# Patient Record
Sex: FEMALE | Race: WHITE | NOT HISPANIC OR LATINO | Employment: FULL TIME | URBAN - METROPOLITAN AREA
[De-identification: names, ages, dates, MRNs, and addresses within clinical notes are randomized per-mention and may not be internally consistent; named-entity substitution may affect disease eponyms.]

---

## 2017-01-16 ENCOUNTER — TELEPHONE (OUTPATIENT)
Dept: SURGERY | Facility: CLINIC | Age: 52
End: 2017-01-16

## 2017-01-16 NOTE — TELEPHONE ENCOUNTER
Patient is 7 years PO gastric bypass.  She is having a colonoscopy this Friday and has questions re: prep.  She requested a call back.    Sayda Mejia MS, RD, RN    Returned patient's call.  She is wondering if she can start the prep earlier than early evening. She is concerned because she doesn't think she will be able to get all the prep in if she starts early evening.  Yes it is okay to start prep in the afternoon.  The most important thing to do is to stay well hydrated and get foods in with electroytes.  Patient verbalized understanding and is agreeable to plan.  Sayda Mejia MS, RD, RN

## 2017-01-30 ENCOUNTER — TELEPHONE (OUTPATIENT)
Dept: INTERNAL MEDICINE | Facility: CLINIC | Age: 52
End: 2017-01-30

## 2017-01-30 DIAGNOSIS — M25.562 LEFT KNEE PAIN, UNSPECIFIED CHRONICITY: Primary | ICD-10-CM

## 2017-01-30 NOTE — TELEPHONE ENCOUNTER
Patient calling requesting referral for knee replacement. Pt has been seen at [facility] and was not happy with their care. Pt states PCP had discussed referral to another  Surgeon   from [facility] 6/7/16. She prefers to be scheduled with a Ortho surgeon at [facility] d/t adverse effects from anesthesia. Requesting call back once completed.  Please advise, thanks.

## 2017-01-30 NOTE — TELEPHONE ENCOUNTER
I recommend Gino Alex of [facility]. He will do surgery at [facility], she will just need to tell him that is her preference. Referral placed

## 2017-02-08 ENCOUNTER — TRANSFERRED RECORDS (OUTPATIENT)
Dept: HEALTH INFORMATION MANAGEMENT | Facility: CLINIC | Age: 52
End: 2017-02-08

## 2017-05-15 DIAGNOSIS — R21 RASH: ICD-10-CM

## 2017-05-15 RX ORDER — ACYCLOVIR 400 MG/1
400 TABLET ORAL 3 TIMES DAILY
Qty: 21 TABLET | Refills: 5 | Status: SHIPPED | OUTPATIENT
Start: 2017-05-15 | End: 2018-07-19

## 2017-05-15 NOTE — TELEPHONE ENCOUNTER
Reason for Call:  Medication or medication refill:    Do you use a [facility]?  Name of the pharmacy and phone number for the current request:  [address] ([place]) - [phone]    Name of the medication requested: acyclovir 400mg TID    Other request: Pt says she filled this a few years ago.    Can we leave a detailed message on this number? YES    Phone number patient can be reached at: Home number on file [phone] (home)    Best Time: any    Call taken on 5/15/2017 at 7:32 AM by Caro Griffin

## 2017-05-15 NOTE — TELEPHONE ENCOUNTER
Acyclovir 400 mg three times daily       Last Written Prescription Date:  2014  Last Office Visit with G, [facility] or [facility] prescribing provider: 6/7/16  Future Office visit:       Routing refill request to provider for review/approval because:  Medication is reported/historical or discontinued

## 2017-06-25 ENCOUNTER — OFFICE VISIT (OUTPATIENT)
Dept: URGENT CARE | Facility: URGENT CARE | Age: 52
End: 2017-06-25
Payer: COMMERCIAL

## 2017-06-25 VITALS
HEART RATE: 63 BPM | RESPIRATION RATE: 16 BRPM | DIASTOLIC BLOOD PRESSURE: 80 MMHG | SYSTOLIC BLOOD PRESSURE: 120 MMHG | BODY MASS INDEX: 30.55 KG/M2 | OXYGEN SATURATION: 98 % | WEIGHT: 198 LBS | TEMPERATURE: 98.8 F

## 2017-06-25 DIAGNOSIS — J01.90 ACUTE SINUSITIS WITH SYMPTOMS > 10 DAYS: Primary | ICD-10-CM

## 2017-06-25 PROCEDURE — 99213 OFFICE O/P EST LOW 20 MIN: CPT | Performed by: PHYSICIAN ASSISTANT

## 2017-06-25 RX ORDER — AMOXICILLIN 875 MG
875 TABLET ORAL 2 TIMES DAILY
Qty: 28 TABLET | Refills: 0 | Status: SHIPPED | OUTPATIENT
Start: 2017-06-25 | End: 2017-07-09

## 2017-06-25 RX ORDER — FLUTICASONE PROPIONATE 50 MCG
2 SPRAY, SUSPENSION (ML) NASAL DAILY
Qty: 1 BOTTLE | Refills: 0 | Status: SHIPPED | OUTPATIENT
Start: 2017-06-25 | End: 2019-12-03

## 2017-06-25 NOTE — PROGRESS NOTES
"    SUBJECTIVE:     Shereen Oliver presents to clinic today for evaluation of nasal congestion, purulent rhinorrhea, post-nasal drainage, sinus pain/pressure, on/off bilateral ear pressure, and tooth pain x ~ 2 weeks duration. Tooth pain so bothersome she went to dentist last week but was told no tooth infections.       Patient denies any significant cough, F/C/N/V/D, rash, abdominal pain or any other acute illness sxs.     Despite above acute illness sxs, patient still alert, active and taking in PO solids and fluids.  Normal BM's and urination.         OBJECTIVE:   /80 (BP Location: Right arm, Cuff Size: Adult Large)  Pulse 63  Temp 98.8  F (37.1  C) (Oral)  Resp 16  Wt 198 lb (89.8 kg)  SpO2 98%  BMI 30.55 kg/m2       General appearance: alert and no apparent distress   Skin color is pink and without rash.   HEENT:   Conjunctiva not injected. Sclera clear.   Left TM is normal: no effusions, no erythema, and normal landmarks.   Right TM is normal: no effusions, no erythema, and normal landmarks.   Nasal mucosa is red and swollen. Maxillary sinuses are tender bilaterally.   Oropharyngeal exam is normal other than evidence of PND: no lesions, erythema, adenopathy or exudate. Neck is supple with no adenopathy   CARDIAC:NORMAL - regular rate and rhythm without murmur.   RESP: Normal - CTA without rales, rhonchi, or wheezing.   NEURO: Alert and oriented.  Normal speech and mentation.  CN II/XII grossly intact.  Gait within normal limits.      ASSESSMENT/PLAN:     (J01.90) Acute sinusitis with symptoms > 10 days  (primary encounter diagnosis)  Plan: amoxicillin (AMOXIL) 875 MG tablet, fluticasone        (FLONASE) 50 MCG/ACT spray    1. ABX as per above   2. Flonase  3.Follow-up with PCP if sxs change, worsen or fail to fully resolve with above tx.  4.  In addition to the above, sinusitis \"red flag\" signs and sxs are reviewed with pt both verbally and by way of printed educational material for home review.  Pt " verbalizes understanding of and agrees to the above plan.

## 2017-06-25 NOTE — NURSING NOTE
"Shereen Oliver is a 52 year old female.      Chief Complaint   Patient presents with     URI     pt is here for a possible sinu sinfection - she is having left sided face pain - did go to her dentist and was told her that everything is fine - has a hx of sinus infections     Urgent Care       Initial /80 (BP Location: Right arm, Cuff Size: Adult Large)  Pulse 63  Temp 98.8  F (37.1  C) (Oral)  Resp 16  Wt 198 lb (89.8 kg)  SpO2 98%  BMI 30.55 kg/m2 Estimated body mass index is 30.55 kg/(m^2) as calculated from the following:    Height as of 6/7/16: 5' 7.5\" (1.715 m).    Weight as of this encounter: 198 lb (89.8 kg).  Medication Reconciliation: complete      Questioned patient about current smoking habits.  Pt. has never smoked.      Jody Shaw CMA      "

## 2017-06-25 NOTE — PATIENT INSTRUCTIONS
Sinusitis (Antibiotic Treatment)    The sinuses are air-filled spaces within the bones of the face. They connect to the inside of the nose. Sinusitis is an inflammation of the tissue lining the sinus cavity. Sinus inflammation can occur during a cold. It can also be due to allergies to pollens and other particles in the air. Sinusitis can cause symptoms of sinus congestion and fullness. A sinus infection causes fever, headache and facial pain. There is often green or yellow drainage from the nose or into the back of the throat (post-nasal drip). You have been given antibiotics to treat this condition.  Home care:    Take the full course of antibiotics as instructed. Do not stop taking them, even if you feel better.    Drink plenty of water, hot tea, and other liquids. This may help thin mucus. It also may promote sinus drainage.    Heat may help soothe painful areas of the face. Use a towel soaked in hot water. Or,  the shower and direct the hot spray onto your face. Using a vaporizer along with a menthol rub at night may also help.     An expectorant containing guaifenesin may help thin the mucus and promote drainage from the sinuses.    Over-the-counter decongestants may be used unless a similar medicine was prescribed. Nasal sprays work the fastest. Use one that contains phenylephrine or oxymetazoline. First blow the nose gently. Then use the spray. Do not use these medicines more often than directed on the label or symptoms may get worse. You may also use tablets containing pseudoephedrine. Avoid products that combine ingredients, because side effects may be increased. Read labels. You can also ask the pharmacist for help. (NOTE: Persons with high blood pressure should not use decongestants. They can raise blood pressure.)    Over-the-counter antihistamines may help if allergies contributed to your sinusitis.      Do not use nasal rinses or irrigation during an acute sinus infection, unless told to by  your health care provider. Rinsing may spread the infection to other sinuses.    Use acetaminophen or ibuprofen to control pain, unless another pain medicine was prescribed. (If you have chronic liver or kidney disease or ever had a stomach ulcer, talk with your doctor before using these medicines. Aspirin should never be used in anyone under 18 years of age who is ill with a fever. It may cause severe liver damage.)    Don't smoke. This can worsen symptoms.  Follow-up care  Follow up with your healthcare provider or our staff if you are not improving within the next week.  When to seek medical advice  Call your healthcare provider if any of these occur:    Facial pain or headache becoming more severe    Stiff neck    Unusual drowsiness or confusion    Swelling of the forehead or eyelids    Vision problems, including blurred or double vision    Fever of 100.4 F (38 C) or higher, or as directed by your healthcare provider    Seizure    Breathing problems    Symptoms not resolving within 10 days  Date Last Reviewed: 4/13/2015 2000-2017 [employer]. [address] [place] [address], [place] [id number]. All rights reserved. This information is not intended as a substitute for professional medical care. Always follow your healthcare professional's instructions.

## 2017-06-25 NOTE — MR AVS SNAPSHOT
After Visit Summary   6/25/2017    Shereen Oliver    MRN: [id number]           Patient Information     Date Of Birth          1965        Visit Information        Provider Department      6/25/2017 10:10 AM Juan M Goode PA-C [facility]        Today's Diagnoses     Acute sinusitis with symptoms > 10 days    -  1      Care Instructions      Sinusitis (Antibiotic Treatment)    The sinuses are air-filled spaces within the bones of the face. They connect to the inside of the nose. Sinusitis is an inflammation of the tissue lining the sinus cavity. Sinus inflammation can occur during a cold. It can also be due to allergies to pollens and other particles in the air. Sinusitis can cause symptoms of sinus congestion and fullness. A sinus infection causes fever, headache and facial pain. There is often green or yellow drainage from the nose or into the back of the throat (post-nasal drip). You have been given antibiotics to treat this condition.  Home care:    Take the full course of antibiotics as instructed. Do not stop taking them, even if you feel better.    Drink plenty of water, hot tea, and other liquids. This may help thin mucus. It also may promote sinus drainage.    Heat may help soothe painful areas of the face. Use a towel soaked in hot water. Or,  the shower and direct the hot spray onto your face. Using a vaporizer along with a menthol rub at night may also help.     An expectorant containing guaifenesin may help thin the mucus and promote drainage from the sinuses.    Over-the-counter decongestants may be used unless a similar medicine was prescribed. Nasal sprays work the fastest. Use one that contains phenylephrine or oxymetazoline. First blow the nose gently. Then use the spray. Do not use these medicines more often than directed on the label or symptoms may get worse. You may also use tablets containing pseudoephedrine. Avoid products that  combine ingredients, because side effects may be increased. Read labels. You can also ask the pharmacist for help. (NOTE: Persons with high blood pressure should not use decongestants. They can raise blood pressure.)    Over-the-counter antihistamines may help if allergies contributed to your sinusitis.      Do not use nasal rinses or irrigation during an acute sinus infection, unless told to by your health care provider. Rinsing may spread the infection to other sinuses.    Use acetaminophen or ibuprofen to control pain, unless another pain medicine was prescribed. (If you have chronic liver or kidney disease or ever had a stomach ulcer, talk with your doctor before using these medicines. Aspirin should never be used in anyone under 18 years of age who is ill with a fever. It may cause severe liver damage.)    Don't smoke. This can worsen symptoms.  Follow-up care  Follow up with your healthcare provider or our staff if you are not improving within the next week.  When to seek medical advice  Call your healthcare provider if any of these occur:    Facial pain or headache becoming more severe    Stiff neck    Unusual drowsiness or confusion    Swelling of the forehead or eyelids    Vision problems, including blurred or double vision    Fever of 100.4 F (38 C) or higher, or as directed by your healthcare provider    Seizure    Breathing problems    Symptoms not resolving within 10 days  Date Last Reviewed: 4/13/2015 2000-2017 [employer]. [address] [place] [address], [place] [address]. All rights reserved. This information is not intended as a substitute for professional medical care. Always follow your healthcare professional's instructions.                Follow-ups after your visit        Who to contact     If you have questions or need follow up information about today's clinic visit or your schedule please contact [facility] directly at [phone].  Normal or non-critical lab and  imaging results will be communicated to you by [employer], letter or phone within 4 business days after the clinic has received the results. If you do not hear from us within 7 days, please contact the clinic through [email or web address] or phone. If you have a critical or abnormal lab result, we will notify you by phone as soon as possible.  Submit refill requests through [email or web address] or call your pharmacy and they will forward the refill request to us. Please allow 3 business days for your refill to be completed.          Additional Information About Your Visit        [email or web address] Information     [email or web address] gives you secure access to your electronic health record. If you see a primary care provider, you can also send messages to your care team and make appointments. If you have questions, please call your primary care clinic.  If you do not have a primary care provider, please call [phone] and they will assist you.        Care EveryWhere ID     This is your Care EveryWhere ID. This could be used by other organizations to access your [place] medical records  [id number]        Your Vitals Were     Pulse Temperature Respirations Pulse Oximetry BMI (Body Mass Index)       63 98.8  F (37.1  C) (Oral) 16 98% 30.55 kg/m2        Blood Pressure from Last 3 Encounters:   06/25/17 120/80   01/20/17 106/75   06/07/16 92/60    Weight from Last 3 Encounters:   06/25/17 198 lb (89.8 kg)   06/07/16 202 lb (91.6 kg)   11/27/15 200 lb 1.6 oz (90.8 kg)              Today, you had the following     No orders found for display         Today's Medication Changes          These changes are accurate as of: 6/25/17 10:45 AM.  If you have any questions, ask your nurse or doctor.               Start taking these medicines.        Dose/Directions    amoxicillin 875 MG tablet   Commonly known as:  AMOXIL   Used for:  Acute sinusitis with symptoms > 10 days   Started by:  Juan M Goode PA-C        Dose:  875 mg   Take 1 tablet (875 mg) by mouth  2 times daily for 14 days   Quantity:  28 tablet   Refills:  0       fluticasone 50 MCG/ACT spray   Commonly known as:  FLONASE   Used for:  Acute sinusitis with symptoms > 10 days   Started by:  Juan M Goode PA-C        Dose:  2 spray   Spray 2 sprays into both nostrils daily   Quantity:  1 Bottle   Refills:  0            Where to get your medicines      These medications were sent to [facility] [address] [address] AT [place] [address]  [address] [facility] [address], [facility] [address]     Phone:  [phone]     amoxicillin 875 MG tablet    fluticasone 50 MCG/ACT spray                Primary Care Provider Office Phone # Fax #    Jody Rbiera [phone]       [facility] [address]  [facility] [address]        Equal Access to Services     TAVIA [facility] Hospital[facility]YSTLE AH: Hadii aad ku hadasho Soomaali, waaxda luqadaha, qaybta kaalmada adeegyada, waxay idiin hayaan adeeg kharash galileon ah. So [facility] [phone].    ATENCIÓN: Si habla español, tiene a barragan disposición servicios gratuitos de asistencia lingüística. Skye al [phone].    We comply with applicable federal civil rights laws and [place] laws. We do not discriminate on the basis of race, color, national origin, age, disability sex, sexual orientation or gender identity.            Thank you!     Thank you for choosing [facility]  for your care. Our goal is always to provide you with excellent care. Hearing back from our patients is one way we can continue to improve our services. Please take a few minutes to complete the written survey that you may receive in the mail after your visit with us. Thank you!             Your Updated Medication List - Protect others around you: Learn how to safely use, store and throw away your medicines at www.disposemymeds.org.          This list is accurate as of: 6/25/17 10:45 AM.  Always use your most recent med  list.                   Brand Name Dispense Instructions for use Diagnosis    acyclovir 400 MG tablet    ZOVIRAX    21 tablet    Take 1 tablet (400 mg) by mouth 3 times daily    Rash       albuterol 108 (90 BASE) MCG/ACT Inhaler    PROAIR HFA/PROVENTIL HFA/VENTOLIN HFA    1 Inhaler    Inhale 2 puffs into the lungs every 4 hours as needed for shortness of breath / dyspnea    Intermittent asthma       amoxicillin 875 MG tablet    AMOXIL    28 tablet    Take 1 tablet (875 mg) by mouth 2 times daily for 14 days    Acute sinusitis with symptoms > 10 days       fluticasone 50 MCG/ACT spray    FLONASE    1 Bottle    Spray 2 sprays into both nostrils daily    Acute sinusitis with symptoms > 10 days       gabapentin 100 MG capsule    NEURONTIN    60 capsule    1-2 per day as needed for pain    Neuropathy (H)       ibuprofen 800 MG tablet    ADVIL/MOTRIN    90 tablet    Take 1 tablet (800 mg) by mouth every 8 hours as needed for moderate pain    S/P biopsy       levonorgestrel 20 MCG/24HR IUD    MIRENA    1 each    1 each (20 mcg) by Intrauterine route once for 1 dose    Contraception       lidocaine-prilocaine cream    EMLA    70 g    Apply topically every 6 hours as needed for moderate pain    S/P biopsy       omeprazole 40 MG capsule    priLOSEC    90 capsule    Take 1 capsule (40 mg) by mouth daily    Perforated ulcer (H)       Vitamin D 30255 UNIT Caps     12    3 times a week

## 2017-07-10 ENCOUNTER — THERAPY VISIT (OUTPATIENT)
Dept: PHYSICAL THERAPY | Facility: CLINIC | Age: 52
End: 2017-07-10
Payer: COMMERCIAL

## 2017-07-10 DIAGNOSIS — M25.511 ACUTE PAIN OF RIGHT SHOULDER: Primary | ICD-10-CM

## 2017-07-10 PROCEDURE — 97161 PT EVAL LOW COMPLEX 20 MIN: CPT | Mod: GP | Performed by: PHYSICAL THERAPIST

## 2017-07-10 PROCEDURE — 97110 THERAPEUTIC EXERCISES: CPT | Mod: GP | Performed by: PHYSICAL THERAPIST

## 2017-07-10 PROCEDURE — 97035 APP MDLTY 1+ULTRASOUND EA 15: CPT | Mod: GP | Performed by: PHYSICAL THERAPIST

## 2017-07-10 NOTE — PROGRESS NOTES
Shereen Oliver is a 52 year old female patient presenting to Physical Therapy with the following Primary Symptoms: Right sided pain along the back of the shoulder, scapula, R upper and forearm along lateral aspect, R armpit pain.  Also pain in middle finger.  Difficulty moving R arm along the front of the arm.  Shoulder feels hot.  She denies having related symptoms spreading to the left side. These pains are described as constant.   She denies having painful cough/sneeze, saddle anaesthesia, severe night pain, peripheral motor deficit, recent bowel/bladder change, recent vision change, ringing in the ears or pain with swallowing. Pain is rated 9/10 at worst. History of symptoms: These pains began suddenly 5 days ago as the result of no specific episode or injury.  Previous treatment has included nothing.   Since onset, these pains are getting worse :  Current Symptoms are worsened by: lifting the arm forward or away from sitting. Current Symptoms are relieved by ice, gabapentin, tylenol.   Recent surgical procedure: old Cx spine fusion C456d, ant approach. Performed on 15 years ago.   General health as reported by patient is:  good.  Pertinent medical history: none significant.  She denies any significant current illness or recent hospital admissions. She denies any regonal implanted devices.  Current occupational status: desk job. Previous functional status:  fully functional prior to pain onset/injury.           HPI                    Objective:    System              Cervical/Thoracic Evaluation  Cervical AROM: normal (pain free all planes head mobility)           Cervical Myotomes:        C4 (shrug):  Left: 5    Right: 5  C5 (Deltoid):  Left: 5    Right: 4  C6 (Biceps):  Left: 5    Right: 5  C7 (Triceps):  Left: 5    Right: 5  C8 (Thumb Ext): Left: 5    Right: 5  T1 (Intrinsics): Right: 5  DTR's:    C5 (Biceps):  Left:  2  Right:  2     C6 (Brachioradialis):  Left:  2  Right:  2     C7 (Triceps):  Left:  2   Right:  2    Cervical Dermatomes:  normal                            Cord Sign:  not assessed         Shoulder Evaluation:  ROM:  AROM:    Flexion:  Right:  30 deg  Extension: Right: NL  Abduction:  Right:  10 deg      External Rotation:  Right:  10                 PROM:    Flexion:  Right: 50 deg      Abduction:  Right:  55 deg      External Rotation:  Right:  15deg                      Stability Testing:        Right shoulder stability negative testing:  Apprehension; Relocation and Sulcus sign  Special Tests:      Right shoulder positive for the following special tests:Impingement (v painful Neer and HK)  Right shoulder negative for the following special tests:Rotator cuff tear  Palpation:      Right shoulder tenderness present at: Supraspinatus; Deltoid and Bicipital Groove  Mobility Tests:      Glenohumeral posterior right:  Hypomobile  Glenohumeral inferior right:  Hypomobile          Scapulohumeral rhythm right:  Hypomobile                                   General     ROS    Assessment/Plan:      Patient is a 52 year old female with right side shoulder complaints.    Patient has the following significant findings with corresponding treatment plan.                Diagnosis 1:  R shoulder ADAMS  Pain -  hot/cold therapy, US, electric stimulation, manual therapy, self management, education, directional preference exercise and home program  Decreased ROM/flexibility - manual therapy and therapeutic exercise  Decreased joint mobility - manual therapy and therapeutic exercise  Decreased strength - therapeutic exercise and therapeutic activities  Inflammation - cold therapy and self management/home program  Impaired muscle performance - neuro re-education  Decreased function - therapeutic activities    Therapy Evaluation Codes:   1) History comprised of:   Personal factors that impact the plan of care:      None.    Comorbidity factors that impact the plan of care are:      None.     Medications impacting care:  Anti-inflammatory.  2) Examination of Body Systems comprised of:   Body structures and functions that impact the plan of care:      Shoulder.   Activity limitations that impact the plan of care are:      Bathing, Cooking, Driving, Dressing, Grasping, Lifting, Working and Sleeping.  3) Clinical presentation characteristics are:   Stable/Uncomplicated.  4) Decision-Making    Low complexity using standardized patient assessment instrument and/or measureable assessment of functional outcome.  Cumulative Therapy Evaluation is: Low complexity.    Previous and current functional limitations:  (See Goal Flow Sheet for this information)    Short term and Long term goals: (See Goal Flow Sheet for this information)     Communication ability:  Patient appears to be able to clearly communicate and understand verbal and written communication and follow directions correctly.  Treatment Explanation - The following has been discussed with the patient:   RX ordered/plan of care  Anticipated outcomes  Possible risks and side effects  This patient would benefit from PT intervention to resume normal activities.   Rehab potential is excellent.    Frequency:  2 X week, once daily  Duration:  for 4 weeks  Discharge Plan:  Achieve all LTG.  Independent in home treatment program.  Reach maximal therapeutic benefit.    Please refer to the daily flowsheet for treatment today, total treatment time and time spent performing 1:1 timed codes.

## 2017-07-11 ENCOUNTER — OFFICE VISIT (OUTPATIENT)
Dept: INTERNAL MEDICINE | Facility: CLINIC | Age: 52
End: 2017-07-11
Payer: COMMERCIAL

## 2017-07-11 VITALS
BODY MASS INDEX: 30.77 KG/M2 | SYSTOLIC BLOOD PRESSURE: 130 MMHG | HEART RATE: 68 BPM | DIASTOLIC BLOOD PRESSURE: 80 MMHG | HEIGHT: 68 IN | OXYGEN SATURATION: 99 % | WEIGHT: 203 LBS | TEMPERATURE: 98.7 F

## 2017-07-11 DIAGNOSIS — M25.511 ACUTE PAIN OF RIGHT SHOULDER: Primary | ICD-10-CM

## 2017-07-11 PROCEDURE — 99213 OFFICE O/P EST LOW 20 MIN: CPT | Performed by: NURSE PRACTITIONER

## 2017-07-11 NOTE — MR AVS SNAPSHOT
After Visit Summary   7/11/2017    Shereen Oliver    MRN: [id number]           Patient Information     Date Of Birth          1965        Visit Information        Provider Department      7/11/2017 11:00 AM Asia Mayo NP [facility]        Today's Diagnoses     Acute pain of right shoulder    -  1       Follow-ups after your visit        Additional Services     ZE PT, HAND, AND CHIROPRACTIC REFERRAL       **This order will print in the [facility] Scheduling Office**    Physical Therapy, Hand Therapy and Chiropractic Care are available through:    *[facility]  *[facility]  *[facility]    Call one number to schedule at any of the above locations: [phone].    Your provider has referred you to: Physical Therapy at [facility] or [facility]    Indication/Reason for Referral: Shoulder Pain  Onset of Illness: 1 week  Therapy Orders: Evaluate and Treat  Special Programs:   Special Request:     Quoc Kumari      Additional Comments for the Therapist or Chiropractor:   Please be aware that coverage of these services is subject to the terms and limitations of your health insurance plan.  Call member services at your health plan with any benefit or coverage questions.      Please bring the following to your appointment:    *Your personal calendar for scheduling future appointments  *Comfortable clothing                  Your next 10 appointments already scheduled     Jul 13, 2017  8:00 AM CDT   ZE Spine with Paul Breyen, PT   [facility] ([facility]  )    [address] [address]   [phone]            Jul 17, 2017  3:10 PM CDT   ZE Spine with Paul Breyen, PT   [facility] ([facility]  )    [address] [address] [address]   [phone]            Jul 21, 2017  3:20 PM CDT   ZE Spine with Paul Breyen, PT   [facility] ([facility]  )    [address]  [address] [address] [facility] [address]  [facility] [address]   [phone]            Jul 24, 2017  3:50 PM CDT   [facility] P[facility]   [facility]    [address] [address] [address]   [phone]            Jul 26, 2017  3:50 PM CDT   ZE Spine with Peggy Rogers, PTA   [facility] [place] [facility]    [address] [address] [address]   [phone]              Who to contact     If you have questions or need follow up information about today's clinic visit or your schedule please contact [facility] directly at [phone].  Normal or non-critical lab and imaging results will be communicated to you by MyChart, letter or phone within 4 business days after the clinic has received the results. If you do not hear from us within 7 days, please contact the clinic through Optify[employer] or phone. If you have a critical or abnormal lab result, we will notify you by phone as soon as possible.  Submit refill requests through eShares or call your pharmacy and they will forward the refill request to us. Please allow 3 business days for your refill to be completed.          Additional Information About Your Visit        eShares Information     eShares gives you secure access to your electronic health record. If you see a primary care provider, you can also send messages to your care team and make appointments. If you have questions, please call your primary care clinic.  If you do not have a primary care provider, please call [phone] and they will assist you.        Care EveryWhere ID     This is your Care EveryWhere ID. This could be used by other organizations to access your [place] medical records  [id number]        Your Vitals Were     Pulse Temperature Height Pulse Oximetry BMI (Body Mass Index)       68 98.7  F (37.1  C) (Oral) 5' 8\" (1.727 m) 99% 30.87 kg/m2        Blood Pressure from Last 3 Encounters:   07/11/17 130/80 "   06/25/17 120/80   01/20/17 106/75    Weight from Last 3 Encounters:   07/11/17 203 lb (92.1 kg)   06/25/17 198 lb (89.8 kg)   06/07/16 202 lb (91.6 kg)              We Performed the Following     ZE PT, HAND, AND CHIROPRACTIC REFERRAL        Primary Care Provider Office Phone # Fax #    Jody Vaishnavi Ribera [phone]       [facility] [address]  [facility] [address]        Equal Access to Services     [facility]: Hadii aad ku hadasho Soomaali, waaxda luqadaha, qaybta kaalmada adeegyada, waxay jazzyin hayaan adeeddie barahona . So [facility] [phone].    ATENCIÓN: Si habla español, tiene a barragan disposición servicios gratuitos de asistencia lingüística. [facility] [phone].    We comply with applicable federal civil rights laws and [place] laws. We do not discriminate on the basis of race, color, national origin, age, disability sex, sexual orientation or gender identity.            Thank you!     Thank you for choosing [facility]  for your care. Our goal is always to provide you with excellent care. Hearing back from our patients is one way we can continue to improve our services. Please take a few minutes to complete the written survey that you may receive in the mail after your visit with us. Thank you!             Your Updated Medication List - Protect others around you: Learn how to safely use, store and throw away your medicines at www.disposemymeds.org.          This list is accurate as of: 7/11/17 11:28 AM.  Always use your most recent med list.                   Brand Name Dispense Instructions for use Diagnosis    acyclovir 400 MG tablet    ZOVIRAX    21 tablet    Take 1 tablet (400 mg) by mouth 3 times daily    Rash       albuterol 108 (90 BASE) MCG/ACT Inhaler    PROAIR HFA/PROVENTIL HFA/VENTOLIN HFA    1 Inhaler    Inhale 2 puffs into the lungs every 4 hours as needed for shortness of breath / dyspnea    Intermittent asthma        fluticasone 50 MCG/ACT spray    FLONASE    1 Bottle    Spray 2 sprays into both nostrils daily    Acute sinusitis with symptoms > 10 days       gabapentin 100 MG capsule    NEURONTIN    60 capsule    1-2 per day as needed for pain    Neuropathy (H)       ibuprofen 800 MG tablet    ADVIL/MOTRIN    90 tablet    Take 1 tablet (800 mg) by mouth every 8 hours as needed for moderate pain    S/P biopsy       levonorgestrel 20 MCG/24HR IUD    MIRENA    1 each    1 each (20 mcg) by Intrauterine route once for 1 dose    Contraception       lidocaine-prilocaine cream    EMLA    70 g    Apply topically every 6 hours as needed for moderate pain    S/P biopsy       omeprazole 40 MG capsule    priLOSEC    90 capsule    Take 1 capsule (40 mg) by mouth daily    Perforated ulcer (H)       Vitamin D 72179 UNIT Caps     12    3 times a week

## 2017-07-11 NOTE — PROGRESS NOTES
"  SUBJECTIVE:                                                    Shereen Oliver is a 52 year old female who presents to clinic today for the following health issues:          Musculoskeletal problem/pain      Duration: 1 week    Description  Location: R shoulder pain    Intensity:  moderate    Accompanying signs and symptoms: none    History  Previous similar problem: YES  Previous evaluation:  FSOC evaluation    Precipitating or alleviating factors:  Trauma or overuse: YES- bike riding 15-40 miles  Aggravating factors include: exercise    Therapies tried and outcome: ice, acetaminophen and PT, wants retro referral, does not want MRI at this time      Problem list and histories reviewed & adjusted, as indicated.  Additional history: as documented        ROS:  Constitutional, HEENT, cardiovascular, pulmonary, gi and gu systems are negative, except as otherwise noted.    OBJECTIVE:     /80  Pulse 68  Temp 98.7  F (37.1  C) (Oral)  Ht 5' 8\" (1.727 m)  Wt 203 lb (92.1 kg)  SpO2 99%  BMI 30.87 kg/m2  Body mass index is 30.87 kg/(m^2).  GENERAL: healthy, alert and no distress        ASSESSMENT/PLAN:               ICD-10-CM    1. Acute pain of right shoulder M25.511 ZE PT, HAND, AND CHIROPRACTIC REFERRAL       May increase gabapentin to 30 mg as directed  Consider MRI/ortho referral if not improving.    Asia Mayo, NATALIE  [facility]      "

## 2017-07-11 NOTE — NURSING NOTE
"Chief Complaint   Patient presents with     Shoulder Pain   pain x 2 weeks , worse after bike 40 mile bike ride , needs referral for ZE and reports not sleeping well   Initial /80  Pulse 68  Temp 98.7  F (37.1  C) (Oral)  Ht 5' 8\" (1.727 m)  Wt 203 lb (92.1 kg)  SpO2 99%  BMI 30.87 kg/m2 Estimated body mass index is 30.87 kg/(m^2) as calculated from the following:    Height as of this encounter: 5' 8\" (1.727 m).    Weight as of this encounter: 203 lb (92.1 kg).  Medication Reconciliation: complete    "

## 2017-07-13 ENCOUNTER — THERAPY VISIT (OUTPATIENT)
Dept: PHYSICAL THERAPY | Facility: CLINIC | Age: 52
End: 2017-07-13
Payer: COMMERCIAL

## 2017-07-13 DIAGNOSIS — M25.519 SHOULDER PAIN: Primary | ICD-10-CM

## 2017-07-13 PROCEDURE — 97140 MANUAL THERAPY 1/> REGIONS: CPT | Mod: GP | Performed by: PHYSICAL THERAPIST

## 2017-07-13 PROCEDURE — 97035 APP MDLTY 1+ULTRASOUND EA 15: CPT | Mod: GP | Performed by: PHYSICAL THERAPIST

## 2017-07-13 PROCEDURE — 97110 THERAPEUTIC EXERCISES: CPT | Mod: GP | Performed by: PHYSICAL THERAPIST

## 2017-07-17 ENCOUNTER — THERAPY VISIT (OUTPATIENT)
Dept: PHYSICAL THERAPY | Facility: CLINIC | Age: 52
End: 2017-07-17
Payer: COMMERCIAL

## 2017-07-17 DIAGNOSIS — M25.511 ACUTE PAIN OF RIGHT SHOULDER: ICD-10-CM

## 2017-07-17 PROCEDURE — 97110 THERAPEUTIC EXERCISES: CPT | Mod: GP | Performed by: PHYSICAL THERAPIST

## 2017-07-18 NOTE — PROGRESS NOTES
Subjective:    HPI                    Objective:    System    Physical Exam    General     ROS    Assessment/Plan:      DISCHARGE REPORT    Progress reporting period is to 7-17-17.       SUBJECTIVE  Shereen's shoulder pain is 98% resolved.      Current pain level is   .     Previous pain level was    .   Changes in function:  Yes (See Goal flowsheet attached for changes in current functional level)  Adverse reaction to treatment or activity: None    OBJECTIVE  R GHJ AROM Flexion 170, abd 175, ER 90.  MMT strong and painfree all directions.     ASSESSMENT/PLAN  Updated problem list and treatment plan: Diagnosis 1:  Right shoulder ADAMS      STG/LTGs have been met or progress has been made towards goals:  Yes (See Goal flow sheet completed today.)  Assessment of Progress: The patient has met all of their long term goals.  Self Management Plans:  Patient is independent in a home treatment program.    Shereen continues to require the following intervention to meet STG and LTG's:  PT intervention is no longer required to meet STG/LTG.    Recommendations:  This patient is ready to be discharged from therapy and continue their home treatment program.    Please refer to the daily flowsheet for treatment today, total treatment time and time spent performing 1:1 timed codes.

## 2017-07-22 ENCOUNTER — HEALTH MAINTENANCE LETTER (OUTPATIENT)
Age: 52
End: 2017-07-22

## 2017-08-23 ENCOUNTER — TRANSFERRED RECORDS (OUTPATIENT)
Dept: HEALTH INFORMATION MANAGEMENT | Facility: CLINIC | Age: 52
End: 2017-08-23

## 2017-11-02 ENCOUNTER — TELEPHONE (OUTPATIENT)
Dept: SURGERY | Facility: CLINIC | Age: 52
End: 2017-11-02

## 2017-11-02 NOTE — TELEPHONE ENCOUNTER
Wants to do cleanse with lot of lemon juice and apple cider vinegar.  She had gastric bypass 2/2010.  She states her pouch has been sensitive since surgery due to a postop reop.  Her phone number [phone]  Okay to leave detailed message.

## 2017-11-02 NOTE — TELEPHONE ENCOUNTER
Spoke with patient.  Encouraged her to avoid use of the acidic cleanse because of stated sensitive pouch.  Also cleanses can cause electrolyte imbalances.  One also looses muscle mass and water and not actual weight during cleanses and weight tends to regain quickly.  Patient verbalized understanding and is agreeable to plan.  Sayda Mejia, MS, RD, RN

## 2017-11-03 ENCOUNTER — NURSE TRIAGE (OUTPATIENT)
Dept: NURSING | Facility: CLINIC | Age: 52
End: 2017-11-03

## 2017-11-03 ENCOUNTER — TELEPHONE (OUTPATIENT)
Dept: INTERNAL MEDICINE | Facility: CLINIC | Age: 52
End: 2017-11-03

## 2017-11-03 NOTE — TELEPHONE ENCOUNTER
"Clinic Action Needed:Yes.   FNA Triage Call  Presenting Problem:    Patient states she will be needing surgery for more fusion to be done on her spine.  Her current spine surgeon recommends surgery, \"but it doesn't need to be immediately.\"  Declines triage.  The current surgeon only does surgery at [facility]. Patient wants to have her surgery done at [facility].  Would like a referral to a good spine surgeon that works out of [facility].     Will send a message to the clinic to call the Patient at [phone].    :  Routed to:Dr. Ribera/nurse zainab Pike RN/ [facility]            "

## 2017-11-03 NOTE — TELEPHONE ENCOUNTER
"Patient states she will be needing surgery for more fusion to be done on her spine.  Her current spine surgeon recommends surgery, \"but it doesn't need to be immediately.\"  Declines triage.  The current surgeon only does surgery at [facility]. Patient wants to have her surgery done at [facility].  Would like a referral to a good spine surgeon that works out of [facility].     Will send a message to the clinic to call the Patient at [phone].       Reason for Disposition    [1] Caller requesting NON-URGENT health information AND [2] PCP's office is the best resource    Protocols used: INFORMATION ONLY CALL-ADULT-AH    "

## 2017-11-07 NOTE — TELEPHONE ENCOUNTER
Attempted to contact pt.  Unable to leave message to call back d/t mailbox full.  Will try again later.

## 2017-11-08 ENCOUNTER — TELEPHONE (OUTPATIENT)
Dept: SURGERY | Facility: CLINIC | Age: 52
End: 2017-11-08

## 2017-11-08 NOTE — TELEPHONE ENCOUNTER
"Patient has H/O gastric bypass 2/2010.  She LM on nurse line stating that since last Thursday her pouch has been very irritated.   It is \"burning up and this is extending on the top to the left.  She stated she also has \"lots of gurgling and is taking Pepcid AC and Zantac.  She reported she is distended and has gone back to drinking mostly protien drinks, water, and plain/bland food for now.  Requesting return call.  Sayda Mejia MS, RD, RN    Returned patient's call.    Can she remember anything that she did to irritate it? She took 1 Wonder Slim pill that had apple cider vinegar, elvin and grapefruit.  Taking 1 Pepcid AC and Zantac 150 mg daily started on or about last Friday.  Helps when takes it and then it comes back.  Using NSAIS's, ETOH, smoking, coffee? - only decaf  Very stressed right now.  Omeprazole will take several days   Offered for patient to be seen but declined.  She would like to give her OTC medication a few more days to work and if not working will be seen next week.  Informed patient that given she had gastric bypass she is at risk for anastomotic ulcer.  Informed patient to seek ED if intractable pain, nausea pr vomiting, fever, light headed, dizzy, SOB or increase HR.  Patient verbalized understanding and is agreeable to plan.  Sayda Mjeia MS, RD, RN      "

## 2017-11-10 ENCOUNTER — NURSE TRIAGE (OUTPATIENT)
Dept: NURSING | Facility: CLINIC | Age: 52
End: 2017-11-10

## 2017-11-10 NOTE — TELEPHONE ENCOUNTER
I read the MD note to Shereen. She doesn't have questions at this time.  Margo Hebert RN-[facility]

## 2017-11-20 ENCOUNTER — TRANSFERRED RECORDS (OUTPATIENT)
Dept: HEALTH INFORMATION MANAGEMENT | Facility: CLINIC | Age: 52
End: 2017-11-20

## 2017-12-11 DIAGNOSIS — G62.9 NEUROPATHY: ICD-10-CM

## 2017-12-11 RX ORDER — GABAPENTIN 100 MG/1
CAPSULE ORAL
Qty: 60 CAPSULE | Refills: 11 | Status: SHIPPED | OUTPATIENT
Start: 2017-12-11 | End: 2019-01-22

## 2018-01-17 ENCOUNTER — HOSPITAL ENCOUNTER (OUTPATIENT)
Dept: GENERAL RADIOLOGY | Facility: CLINIC | Age: 53
Discharge: HOME | End: 2018-01-17
Attending: INTERNAL MEDICINE | Admitting: INTERNAL MEDICINE

## 2018-01-17 DIAGNOSIS — R76.12 POSITIVE QUANTIFERON-TB GOLD TEST: ICD-10-CM

## 2018-01-17 PROCEDURE — 40000293 XR CHEST 1 VIEW, EMPLOYEE HEALTH

## 2018-01-23 ENCOUNTER — OFFICE VISIT (OUTPATIENT)
Dept: NEUROSURGERY | Facility: CLINIC | Age: 53
End: 2018-01-23
Attending: NEUROLOGICAL SURGERY
Payer: COMMERCIAL

## 2018-01-23 VITALS
SYSTOLIC BLOOD PRESSURE: 117 MMHG | HEIGHT: 68 IN | WEIGHT: 201 LBS | OXYGEN SATURATION: 100 % | DIASTOLIC BLOOD PRESSURE: 71 MMHG | BODY MASS INDEX: 30.46 KG/M2 | TEMPERATURE: 97.9 F | HEART RATE: 65 BPM

## 2018-01-23 DIAGNOSIS — G95.89 MYELOMALACIA OF CERVICAL CORD (H): ICD-10-CM

## 2018-01-23 DIAGNOSIS — Z98.1 S/P CERVICAL SPINAL FUSION: Primary | ICD-10-CM

## 2018-01-23 DIAGNOSIS — M54.12 CERVICAL RADICULOPATHY: ICD-10-CM

## 2018-01-23 PROCEDURE — G0463 HOSPITAL OUTPT CLINIC VISIT: HCPCS | Performed by: NEUROLOGICAL SURGERY

## 2018-01-23 PROCEDURE — 99204 OFFICE O/P NEW MOD 45 MIN: CPT | Performed by: NEUROLOGICAL SURGERY

## 2018-01-23 NOTE — LETTER
1/23/2018         RE: Shereen Oliver  [address]    [place] [address]        Dear Colleague,    Thank you for referring your patient, Shereen Oliver, to the [facility]. Please see a copy of my visit note below.    [facility]   Neurosurgery Consult Note         CC: Neck pain and occasional RUE pain    Primary care Provider: Jody Ribera    I was aked to see this patient by:  Jody Ribera MD  [address]  [place] [address]      [place]: Shereen Oliver is a 52 year old female that presents to clinic for a second opinion with a complaint of neck pain and intermittent UE pain. She is an avid biker ad says she has pain with riding. She also describes RUE numbness and paresthesias. She had a C4-6 emergent ACDF by Dr. Shaikh in 2001. She has recently been seen by Dr. Melendez whom she says recommended adjacent level fusion and Deal whom she said recommended observation. She has tried PT and Med[facility] therapy.      Past Medical History:   Diagnosis Date     Agoraphobia with panic disorder     elevators and cars     Diabetes mellitus     type 2 resolved after gastric bypass     Disturbance of skin sensation     left leg as residual of neck surgery     Gastro-oesophageal reflux disease      Genital herpes, unspecified      Intrinsic asthma, unspecified     exercise induced     Obesity      Other psoriasis      Spasm of muscle     left leg as residual of neck surgery       Past Surgical History:   Procedure Laterality Date     BIOPSY VULVA Left 11/10/2015    Procedure: BIOPSY VULVA;  Surgeon: Diana Rosado DO;  Location:  OR     C IUD,MIRENA  6/2010     C VILLA W/O FACETEC FORAMOT/DSKC 1/2 VRT SEG, CERVICAL  2001    fusion c4-5-6,herniated discs     CHOLECYSTECTOMY, LAPOROSCOPIC  2007    Cholecystectomy, Laparoscopic     COLONOSCOPY N/A 1/20/2017    Procedure: COLONOSCOPY;  Surgeon: Tank Peguero MD;  Location:  GI     D &  C  6/29/10    removal of polyp and placement of Mirena     DAVINCI BYPASS GASTRIC BRANT-EN-Y         Current Outpatient Prescriptions   Medication     gabapentin (NEURONTIN) 100 MG capsule     fluticasone (FLONASE) 50 MCG/ACT spray     acyclovir (ZOVIRAX) 400 MG tablet     lidocaine-prilocaine (EMLA) cream     albuterol (PROAIR HFA, PROVENTIL HFA, VENTOLIN HFA) 108 (90 BASE) MCG/ACT inhaler     omeprazole (PRILOSEC) 40 MG capsule     ibuprofen (ADVIL,MOTRIN) 800 MG tablet     levonorgestrel (MIRENA) 20 MCG/24HR IUD     VITAMIN D 08431 UNIT OR CAPS     No current facility-administered medications for this visit.        Allergies   Allergen Reactions     No Known Drug Allergies        Social History     Social History     Marital status: Single     Spouse name: N/A     Number of children: 0     Years of education: N/A     Occupational History      [employer]      Social History Main Topics     Smoking status: Never Smoker     Smokeless tobacco: Never Used     Alcohol use Yes      Comment: seldom     Drug use: No     Sexual activity: Yes     Partners: Male     Birth control/ protection: IUD     Other Topics Concern     None     Social History Narrative       Family History   Problem Relation Age of Onset     GASTROINTESTINAL DISEASE Mother      gallbladder stones     Hypertension Father      Family History Negative Brother      DIABETES Maternal Grandfather          Review Of Systems  Skin: negative  Eyes: negative  Ears/Nose/Throat: negative  Respiratory: No shortness of breath, dyspnea on exertion, cough, or hemoptysis  Cardiovascular: negative  Gastrointestinal: negative  Genitourinary: negative  Musculoskeletal: as above  Neurologic: as above  Psychiatric: negative  Hematologic/Lymphatic/Immunologic: negative  Endocrine: negative    B/P: 117/71, T: 97.9, P: 65, R: Data Unavailable    Examination:  Normal affect and mood  No obvious labored respiration  No skin lesions noted   No obvious pitting edema  No abnormal  psychiatric behavior  No obvious musculoskeletal abnormalities   Awake  Alert  Oriented x 3  Pupils reactive and EOMI  Speech clear  Cranial nerves II - XII intact  Face symmetric  Tongue midline  Neck nontender   Normal ROM of neck  Motor exam nonfocal  Sensation intact  DTR 2+ throughout   Godinez's sign positive  Spurling's sign negative  Ambulation stable    Imaging:   MRI cervical spine - C4-6 ACDF with fusion, bilateral C3-4 stenosis and left C6-7 foraminal stenosis. She has myelomalacia behind the anterior cervical fusion    Assessment/Plan:   I discussed with the patient that I would observe these symptoms at this time and if she notices a decline in function or any other neurological issues, I would recommend adjacent level ACDF. She will call with any new symptoms or concerns.        Woodrwo Nguyễn MD, MS, FAANS  Neurosurgeon  [facility]  [address], [address]  [place]  [address]  Tel [phone]    Again, thank you for allowing me to participate in the care of your patient.        Sincerely,        Woodrow Nguyễn MD

## 2018-01-23 NOTE — NURSING NOTE
"Shereen Oliver is a 52 year old female who presents for:  Chief Complaint   Patient presents with     Neurologic Problem     opinion- cervical fusion        Initial Vitals:  There were no vitals taken for this visit. Estimated body mass index is 30.87 kg/(m^2) as calculated from the following:    Height as of 7/11/17: 5' 8\" (1.727 m).    Weight as of 7/11/17: 203 lb (92.1 kg).. There is no height or weight on file to calculate BSA. BP completed using cuff size: regular  Data Unavailable    Do you feel safe in your environment?  Yes  Do you need any refills today? No    Nursing Comments: opinion- cervical fusion.  Patient rates her pain today as 0        Jody Michael CMA, AAS      Discharge plan:   See providers dictation   Jody Michael CMA, AAS       "

## 2018-01-23 NOTE — MR AVS SNAPSHOT
"              After Visit Summary   1/23/2018    Shereen Oliver    MRN: [id number]           Patient Information     Date Of Birth          1965        Visit Information        Provider Department      1/23/2018 9:20 AM Woodrow Nguyễn MD [facility] Neurosurgery [facility]        Today's Diagnoses     S/P cervical spinal fusion    -  1    Myelomalacia of cervical cord (H)        Cervical radiculopathy           Follow-ups after your visit        Who to contact     If you have questions or need follow up information about today's clinic visit or your schedule please contact [facility] directly at [phone].  Normal or non-critical lab and imaging results will be communicated to you by MyChart, letter or phone within 4 business days after the clinic has received the results. If you do not hear from us within 7 days, please contact the clinic through [employer] or phone. If you have a critical or abnormal lab result, we will notify you by phone as soon as possible.  Submit refill requests through [employer] or call your pharmacy and they will forward the refill request to us. Please allow 3 business days for your refill to be completed.          Additional Information About Your Visit        MyChart Information     [employer] gives you secure access to your electronic health record. If you see a primary care provider, you can also send messages to your care team and make appointments. If you have questions, please call your primary care clinic.  If you do not have a primary care provider, please call [phone] and they will assist you.        Care EveryWhere ID     This is your Care EveryWhere ID. This could be used by other organizations to access your [facility] medical records  [id number]        Your Vitals Were     Pulse Temperature Height Pulse Oximetry Breastfeeding? BMI (Body Mass Index)    65 97.9  F (36.6  C) (Oral) 1.715 m (5' 7.5\") 100% No 31.02 kg/m2       Blood " Pressure from Last 3 Encounters:   01/23/18 117/71   07/11/17 130/80   06/25/17 120/80    Weight from Last 3 Encounters:   01/23/18 91.2 kg (201 lb)   07/11/17 92.1 kg (203 lb)   06/25/17 89.8 kg (198 lb)              Today, you had the following     No orders found for display       Primary Care Provider Office Phone # Fax #    Jody Ribera [phone]       [address] [facility] [address]  [facility] [address]        Equal Access to Services     [facility]: Hadii aad ku hadasho Soomaali, waaxda luqadaha, qaybta kaalmada adeegyajuan, yari barahona . So [facility] [phone].    ATENCIÓN: Si habla español, tiene a barragan disposición servicios gratuitos de asistencia lingüística. [facility] [phone].    We comply with applicable federal civil rights laws and [place] laws. We do not discriminate on the basis of race, color, national origin, age, disability, sex, sexual orientation, or gender identity.            Thank you!     Thank you for choosing [facility]  for your care. Our goal is always to provide you with excellent care. Hearing back from our patients is one way we can continue to improve our services. Please take a few minutes to complete the written survey that you may receive in the mail after your visit with us. Thank you!             Your Updated Medication List - Protect others around you: Learn how to safely use, store and throw away your medicines at www.disposemymeds.org.          This list is accurate as of: 1/23/18 10:39 AM.  Always use your most recent med list.                   Brand Name Dispense Instructions for use Diagnosis    acyclovir 400 MG tablet    ZOVIRAX    21 tablet    Take 1 tablet (400 mg) by mouth 3 times daily    Rash       albuterol 108 (90 BASE) MCG/ACT Inhaler    PROAIR HFA/PROVENTIL HFA/VENTOLIN HFA    1 Inhaler    Inhale 2 puffs into the lungs every 4 hours as needed for shortness of breath / dyspnea     Intermittent asthma       fluticasone 50 MCG/ACT spray    FLONASE    1 Bottle    Spray 2 sprays into both nostrils daily    Acute sinusitis with symptoms > 10 days       gabapentin 100 MG capsule    NEURONTIN    60 capsule    1-2 per day as needed for pain    Neuropathy       ibuprofen 800 MG tablet    ADVIL/MOTRIN    90 tablet    Take 1 tablet (800 mg) by mouth every 8 hours as needed for moderate pain    S/P biopsy       levonorgestrel 20 MCG/24HR IUD    MIRENA    1 each    1 each (20 mcg) by Intrauterine route once for 1 dose    Contraception       lidocaine-prilocaine cream    EMLA    70 g    Apply topically every 6 hours as needed for moderate pain    S/P biopsy       omeprazole 40 MG capsule    priLOSEC    90 capsule    Take 1 capsule (40 mg) by mouth daily    Perforated ulcer (H)       Vitamin D 96173 UNIT Caps     12    3 times a week

## 2018-01-23 NOTE — PROGRESS NOTES
[facility]   Neurosurgery Consult Note         CC: Neck pain and occasional RUE pain    Primary care Provider: Jody Ribera    I was aked to see this patient by:  Jody Ribera MD  [address] [place] [address]      [place]: Shereen Oliver is a 52 year old female that presents to clinic for a second opinion with a complaint of neck pain and intermittent UE pain. She is an avid biker ad says she has pain with riding. She also describes RUE numbness and paresthesias. She had a C4-6 emergent ACDF by Dr. Shaikh in 2001. She has recently been seen by Dr. Melendez whom she says recommended adjacent level fusion and Deal whom she said recommended observation. She has tried PT and MedRx therapy.      Past Medical History:   Diagnosis Date     Agoraphobia with panic disorder     elevators and cars     Diabetes mellitus     type 2 resolved after gastric bypass     Disturbance of skin sensation     left leg as residual of neck surgery     Gastro-oesophageal reflux disease      Genital herpes, unspecified      Intrinsic asthma, unspecified     exercise induced     Obesity      Other psoriasis      Spasm of muscle     left leg as residual of neck surgery       Past Surgical History:   Procedure Laterality Date     BIOPSY VULVA Left 11/10/2015    Procedure: BIOPSY VULVA;  Surgeon: Diana Rosado DO;  Location: [facility] OR     C IUD,MIRENA  6/2010     C VILLA W/O FACETEC FORAMOT/DSKC 1/2 VRT SEG, CERVICAL  2001    fusion c4-5-6,herniated discs     CHOLECYSTECTOMY, LAPOROSCOPIC  2007    Cholecystectomy, Laparoscopic     COLONOSCOPY N/A 1/20/2017    Procedure: COLONOSCOPY;  Surgeon: Tank Peguero MD;  Location:  GI     D & C  6/29/10    removal of polyp and placement of Mirena     DAVINCI BYPASS GASTRIC BRANT-EN-Y         Current Outpatient Prescriptions   Medication     gabapentin (NEURONTIN) 100 MG capsule     fluticasone (FLONASE) 50 MCG/ACT spray     acyclovir (ZOVIRAX) 400 MG  tablet     lidocaine-prilocaine (EMLA) cream     albuterol (PROAIR HFA, PROVENTIL HFA, VENTOLIN HFA) 108 (90 BASE) MCG/ACT inhaler     omeprazole (PRILOSEC) 40 MG capsule     ibuprofen (ADVIL,MOTRIN) 800 MG tablet     levonorgestrel (MIRENA) 20 MCG/24HR IUD     VITAMIN D 27894 UNIT OR CAPS     No current facility-administered medications for this visit.        Allergies   Allergen Reactions     No Known Drug Allergies        Social History     Social History     Marital status: Single     Spouse name: N/A     Number of children: 0     Years of education: N/A     Occupational History      [employer]      Social History Main Topics     Smoking status: Never Smoker     Smokeless tobacco: Never Used     Alcohol use Yes      Comment: seldom     Drug use: No     Sexual activity: Yes     Partners: Male     Birth control/ protection: IUD     Other Topics Concern     None     Social History Narrative       Family History   Problem Relation Age of Onset     GASTROINTESTINAL DISEASE Mother      gallbladder stones     Hypertension Father      Family History Negative Brother      DIABETES Maternal Grandfather          Review Of Systems  Skin: negative  Eyes: negative  Ears/Nose/Throat: negative  Respiratory: No shortness of breath, dyspnea on exertion, cough, or hemoptysis  Cardiovascular: negative  Gastrointestinal: negative  Genitourinary: negative  Musculoskeletal: as above  Neurologic: as above  Psychiatric: negative  Hematologic/Lymphatic/Immunologic: negative  Endocrine: negative    B/P: 117/71, T: 97.9, P: 65, R: Data Unavailable    Examination:  Normal affect and mood  No obvious labored respiration  No skin lesions noted   No obvious pitting edema  No abnormal psychiatric behavior  No obvious musculoskeletal abnormalities   Awake  Alert  Oriented x 3  Pupils reactive and EOMI  Speech clear  Cranial nerves II - XII intact  Face symmetric  Tongue midline  Neck nontender   Normal ROM of neck  Motor exam nonfocal  Sensation  intact  DTR 2+ throughout   Godinez's sign positive  Spurling's sign negative  Ambulation stable    Imaging:   MRI cervical spine - C4-6 ACDF with fusion, bilateral C3-4 stenosis and left C6-7 foraminal stenosis. She has myelomalacia behind the anterior cervical fusion    Assessment/Plan:   I discussed with the patient that I would observe these symptoms at this time and if she notices a decline in function or any other neurological issues, I would recommend adjacent level ACDF. She will call with any new symptoms or concerns.        Woodrow Nguyễn MD, MS, FAANS  Neurosurgeon  [facility]  [facility]  [address], [address]  [place]  [address]  Tel [phone]

## 2018-01-24 ENCOUNTER — TELEPHONE (OUTPATIENT)
Dept: INTERNAL MEDICINE | Facility: CLINIC | Age: 53
End: 2018-01-24

## 2018-01-30 ENCOUNTER — TELEPHONE (OUTPATIENT)
Dept: INTERNAL MEDICINE | Facility: CLINIC | Age: 53
End: 2018-01-30

## 2018-01-30 NOTE — TELEPHONE ENCOUNTER
Panel Management Review      Patient has the following on her problem list:     Asthma review     ACT Total Scores 11/3/2015   ACT TOTAL SCORE -   ASTHMA ER VISITS -   ASTHMA HOSPITALIZATIONS -   ACT TOTAL SCORE (Goal Greater than or Equal to 20) 25   In the past 12 months, how many times did you visit the emergency room for your asthma without being admitted to the hospital? 0   In the past 12 months, how many times were you hospitalized overnight because of your asthma? 0      1. Is Asthma diagnosis on the Problem List? Yes    2. Is Asthma listed on Health Maintenance? Yes    3. Patient is due for:  ACT      Composite cancer screening  Chart review shows that this patient is due/due soon for the following Pap Smear  Summary:    Patient is due/failing the following:   ACT and PAP    Action needed:   Patient needs office visit for Pap. and Patient needs to do ACT.    Type of outreach:    Phone, spoke to patient.  she will call back to schedule with OB/GYN    Questions for provider review:    None                                                                                                                                    MINI BAUTISTA,SHERRI     Chart routed to no one .

## 2018-02-02 PROBLEM — M25.519 SHOULDER PAIN: Status: RESOLVED | Noted: 2017-07-13 | Resolved: 2018-02-02

## 2018-02-11 ENCOUNTER — OFFICE VISIT (OUTPATIENT)
Dept: URGENT CARE | Facility: URGENT CARE | Age: 53
End: 2018-02-11
Payer: COMMERCIAL

## 2018-02-11 VITALS — RESPIRATION RATE: 16 BRPM | HEART RATE: 68 BPM | OXYGEN SATURATION: 98 % | TEMPERATURE: 97.7 F

## 2018-02-11 DIAGNOSIS — J01.90 ACUTE SINUSITIS WITH COEXISTING CONDITION REQUIRING PROPHYLACTIC TREATMENT: Primary | ICD-10-CM

## 2018-02-11 PROCEDURE — 99213 OFFICE O/P EST LOW 20 MIN: CPT | Performed by: FAMILY MEDICINE

## 2018-02-11 RX ORDER — AMOXICILLIN 875 MG
875 TABLET ORAL 2 TIMES DAILY
Qty: 20 TABLET | Refills: 0 | Status: SHIPPED | OUTPATIENT
Start: 2018-02-11 | End: 2018-02-21

## 2018-02-11 RX ORDER — FLUTICASONE PROPIONATE 50 MCG
1-2 SPRAY, SUSPENSION (ML) NASAL DAILY
Qty: 1 BOTTLE | Refills: 1 | Status: SHIPPED | OUTPATIENT
Start: 2018-02-11 | End: 2019-12-03

## 2018-02-11 NOTE — NURSING NOTE
"Chief Complaint   Patient presents with     Urgent Care     sinus infection; 7+ days - head congestion, sinus pressure, post nasal drip, teeth pain - all on the left side       Initial Pulse 68  Temp 97.7  F (36.5  C) (Tympanic)  Resp 16  SpO2 98% Estimated body mass index is 31.02 kg/(m^2) as calculated from the following:    Height as of 1/23/18: 5' 7.5\" (1.715 m).    Weight as of 1/23/18: 201 lb (91.2 kg).  Medication Reconciliation: complete  Ade Willingham CMA  "

## 2018-02-11 NOTE — PROGRESS NOTES
SUBJECTIVE:   Shereen Oliver is a 52 year old female presenting with a chief complaint of facial pain/pressure and greenish rhinitis.  Will get clear runny nose.  Congestion is very thick.  Worsening left sided frontal and maxillary sinus pressure.  Tactile fever.  Patient last sinus infection in the summer.  More fatigue, mild cough.  Onset of symptoms was 1 week(s) ago.  Course of illness is worsening.    Severity moderate  Current and Associated symptoms: facial pain/pressure, headache and fatigue  Treatment measures tried include Fluids, Rest and nasal saline rinse.  Predisposing factors include HX of chronic sinusitis, asthma, GERD.    Past Medical History:   Diagnosis Date     Agoraphobia with panic disorder     elevators and cars     Diabetes mellitus     type 2 resolved after gastric bypass     Disturbance of skin sensation     left leg as residual of neck surgery     Gastro-oesophageal reflux disease      Genital herpes, unspecified      Intrinsic asthma, unspecified     exercise induced     Obesity      Other psoriasis      Spasm of muscle     left leg as residual of neck surgery     Current Outpatient Prescriptions   Medication Sig Dispense Refill     gabapentin (NEURONTIN) 100 MG capsule 1-2 per day as needed for pain 60 capsule 11     acyclovir (ZOVIRAX) 400 MG tablet Take 1 tablet (400 mg) by mouth 3 times daily 21 tablet 5     lidocaine-prilocaine (EMLA) cream Apply topically every 6 hours as needed for moderate pain 70 g 1     albuterol (PROAIR HFA, PROVENTIL HFA, VENTOLIN HFA) 108 (90 BASE) MCG/ACT inhaler Inhale 2 puffs into the lungs every 4 hours as needed for shortness of breath / dyspnea 1 Inhaler 6     omeprazole (PRILOSEC) 40 MG capsule Take 1 capsule (40 mg) by mouth daily 90 capsule 3     fluticasone (FLONASE) 50 MCG/ACT spray Spray 2 sprays into both nostrils daily (Patient not taking: Reported on 2/11/2018) 1 Bottle 0     ibuprofen (ADVIL,MOTRIN) 800 MG tablet Take 1 tablet (800 mg) by  mouth every 8 hours as needed for moderate pain (Patient not taking: Reported on 1/23/2018) 90 tablet 1     levonorgestrel (MIRENA) 20 MCG/24HR IUD 1 each (20 mcg) by Intrauterine route once for 1 dose 1 each 0     VITAMIN D 76715 UNIT OR CAPS 3 times a week 12 0     Social History   Substance Use Topics     Smoking status: Never Smoker     Smokeless tobacco: Never Used     Alcohol use Yes      Comment: seldom       ROS:  CONSTITUTIONAL:NEGATIVE for fever, chills, change in weight and POSITIVE  for fatigue and malaise  INTEGUMENTARY/SKIN: NEGATIVE for worrisome rashes, moles or lesions  ENT/MOUTH: POSITIVE for Hx sinus infections, nasal congestion, postnasal drainage, rhinorrhea-clear, rhinorrhea-purulent and sinus pressure  RESP:POSITIVE for cough-non productive  CV: NEGATIVE for chest pain, palpitations or peripheral edema  GI: NEGATIVE for nausea, abdominal pain, heartburn, or change in bowel habits    OBJECTIVE:  Pulse 68  Temp 97.7  F (36.5  C) (Tympanic)  Resp 16  SpO2 98%  GENERAL APPEARANCE: healthy, alert and no distress  EYES: EOMI,  PERRL, conjunctiva clear  HENT: ear canals and TM's normal.  Nose and mouth without ulcers, erythema or lesions.  Sinus tenderness on left frontal and left maxillary on percussion  NECK: supple, nontender, no lymphadenopathy  RESP: lungs clear to auscultation - no rales, rhonchi or wheezes  CV: regular rates and rhythm, normal S1 S2, no murmur noted  NEURO: Normal strength and tone, sensory exam grossly normal,  normal speech and mentation  SKIN: no suspicious lesions or rashes    ASSESSMENT/PLAN:  (J01.90) Acute sinusitis with coexisting condition requiring prophylactic treatment  (primary encounter diagnosis)  Plan: amoxicillin (AMOXIL) 875 MG tablet, fluticasone        (FLONASE) 50 MCG/ACT spray            Reviewed symptomatic treatment, plenty of fluids and rest.  RX Amoxicillin given, encourage to take full course.  RX flonase given.  Continue with saline rinse,  consider sudafed to help with congestion.    Return to clinic if no resolution of symptoms.    Jakob Diane MD  February 11, 2018 11:32 AM

## 2018-02-11 NOTE — PATIENT INSTRUCTIONS
Take full course of antibiotic for sinus infection.  Use flonase to help with congestion.  Consider sudafed 60 mg every 6 hours as needed to help with congestion.  Continue with saline rinse.      Sinusitis (Antibiotic Treatment)    The sinuses are air-filled spaces within the bones of the face. They connect to the inside of the nose. Sinusitis is an inflammation of the tissue lining the sinus cavity. Sinus inflammation can occur during a cold. It can also be due to allergies to pollens and other particles in the air. Sinusitis can cause symptoms of sinus congestion and fullness. A sinus infection causes fever, headache and facial pain. There is often green or yellow drainage from the nose or into the back of the throat (post-nasal drip). You have been given antibiotics to treat this condition.  Home care:    Take the full course of antibiotics as instructed. Do not stop taking them, even if you feel better.    Drink plenty of water, hot tea, and other liquids. This may help thin mucus. It also may promote sinus drainage.    Heat may help soothe painful areas of the face. Use a towel soaked in hot water. Or,  the shower and direct the hot spray onto your face. Using a vaporizer along with a menthol rub at night may also help.     An expectorant containing guaifenesin may help thin the mucus and promote drainage from the sinuses.    Over-the-counter decongestants may be used unless a similar medicine was prescribed. Nasal sprays work the fastest. Use one that contains phenylephrine or oxymetazoline. First blow the nose gently. Then use the spray. Do not use these medicines more often than directed on the label or symptoms may get worse. You may also use tablets containing pseudoephedrine. Avoid products that combine ingredients, because side effects may be increased. Read labels. You can also ask the pharmacist for help. (NOTE: Persons with high blood pressure should not use decongestants. They can raise  blood pressure.)    Over-the-counter antihistamines may help if allergies contributed to your sinusitis.      Do not use nasal rinses or irrigation during an acute sinus infection, unless told to by your health care provider. Rinsing may spread the infection to other sinuses.    Use acetaminophen or ibuprofen to control pain, unless another pain medicine was prescribed. (If you have chronic liver or kidney disease or ever had a stomach ulcer, talk with your doctor before using these medicines. Aspirin should never be used in anyone under 18 years of age who is ill with a fever. It may cause severe liver damage.)    Don't smoke. This can worsen symptoms.  Follow-up care  Follow up with your healthcare provider or our staff if you are not improving within the next week.  When to seek medical advice  Call your healthcare provider if any of these occur:    Facial pain or headache becoming more severe    Stiff neck    Unusual drowsiness or confusion    Swelling of the forehead or eyelids    Vision problems, including blurred or double vision    Fever of 100.4 F (38 C) or higher, or as directed by your healthcare provider    Seizure    Breathing problems    Symptoms not resolving within 10 days  Date Last Reviewed: 4/13/2015 2000-2017 [employer]. [address] [place] [address], [place] [address]. All rights reserved. This information is not intended as a substitute for professional medical care. Always follow your healthcare professional's instructions.

## 2018-02-11 NOTE — MR AVS SNAPSHOT
After Visit Summary   2/11/2018    Shereen Oliver    MRN: [id number]           Patient Information     Date Of Birth          1965        Visit Information        Provider Department      2/11/2018 10:35 AM Jakob Diane MD [facility]        Today's Diagnoses     Acute sinusitis with coexisting condition requiring prophylactic treatment    -  1      Care Instructions    Take full course of antibiotic for sinus infection.  Use flonase to help with congestion.  Consider sudafed 60 mg every 6 hours as needed to help with congestion.  Continue with saline rinse.      Sinusitis (Antibiotic Treatment)    The sinuses are air-filled spaces within the bones of the face. They connect to the inside of the nose. Sinusitis is an inflammation of the tissue lining the sinus cavity. Sinus inflammation can occur during a cold. It can also be due to allergies to pollens and other particles in the air. Sinusitis can cause symptoms of sinus congestion and fullness. A sinus infection causes fever, headache and facial pain. There is often green or yellow drainage from the nose or into the back of the throat (post-nasal drip). You have been given antibiotics to treat this condition.  Home care:    Take the full course of antibiotics as instructed. Do not stop taking them, even if you feel better.    Drink plenty of water, hot tea, and other liquids. This may help thin mucus. It also may promote sinus drainage.    Heat may help soothe painful areas of the face. Use a towel soaked in hot water. Or,  the shower and direct the hot spray onto your face. Using a vaporizer along with a menthol rub at night may also help.     An expectorant containing guaifenesin may help thin the mucus and promote drainage from the sinuses.    Over-the-counter decongestants may be used unless a similar medicine was prescribed. Nasal sprays work the fastest. Use one that contains phenylephrine or oxymetazoline. First blow  the nose gently. Then use the spray. Do not use these medicines more often than directed on the label or symptoms may get worse. You may also use tablets containing pseudoephedrine. Avoid products that combine ingredients, because side effects may be increased. Read labels. You can also ask the pharmacist for help. (NOTE: Persons with high blood pressure should not use decongestants. They can raise blood pressure.)    Over-the-counter antihistamines may help if allergies contributed to your sinusitis.      Do not use nasal rinses or irrigation during an acute sinus infection, unless told to by your health care provider. Rinsing may spread the infection to other sinuses.    Use acetaminophen or ibuprofen to control pain, unless another pain medicine was prescribed. (If you have chronic liver or kidney disease or ever had a stomach ulcer, talk with your doctor before using these medicines. Aspirin should never be used in anyone under 18 years of age who is ill with a fever. It may cause severe liver damage.)    Don't smoke. This can worsen symptoms.  Follow-up care  Follow up with your healthcare provider or our staff if you are not improving within the next week.  When to seek medical advice  Call your healthcare provider if any of these occur:    Facial pain or headache becoming more severe    Stiff neck    Unusual drowsiness or confusion    Swelling of the forehead or eyelids    Vision problems, including blurred or double vision    Fever of 100.4 F (38 C) or higher, or as directed by your healthcare provider    Seizure    Breathing problems    Symptoms not resolving within 10 days  Date Last Reviewed: 4/13/2015 2000-2017 [employer]. [address] [place] [address], [place] [address]. All rights reserved. This information is not intended as a substitute for professional medical care. Always follow your healthcare professional's instructions.                Follow-ups after your visit        Follow-up notes  from your care team     Return if symptoms worsen or fail to improve.      Your next 10 appointments already scheduled     Mar 13, 2018  8:30 AM CDT   Office Visit with Diana Rosado,    [facility] ([facility]    [address]  [facility] [id number] [phone]           Bring a current list of meds and any records pertaining to this visit. For Physicals, please bring immunization records and any forms needing to be filled out. Please arrive 10 minutes early to complete paperwork.              Who to contact     If you have questions or need follow up information about today's clinic visit or your schedule please contact [facility] directly at [phone].  Normal or non-critical lab and imaging results will be communicated to you by [employer] Medical[employer], letter or phone within 4 business days after the clinic has received the results. If you do not hear from us within 7 days, please contact the clinic through [employer] or phone. If you have a critical or abnormal lab result, we will notify you by phone as soon as possible.  Submit refill requests through Asurvest or call your pharmacy and they will forward the refill request to us. Please allow 3 business days for your refill to be completed.          Additional Information About Your Visit        Asurvest Information     Asurvest gives you secure access to your electronic health record. If you see a primary care provider, you can also send messages to your care team and make appointments. If you have questions, please call your primary care clinic.  If you do not have a primary care provider, please call [phone] and they will assist you.        Care EveryWhere ID     This is your Care EveryWhere ID. This could be used by other organizations to access your [place] medical records  [id number]        Your Vitals Were     Pulse Temperature Respirations Pulse Oximetry          68 97.7  F (36.5  C)  (Tympanic) 16 98%         Blood Pressure from Last 3 Encounters:   01/23/18 117/71   07/11/17 130/80   06/25/17 120/80    Weight from Last 3 Encounters:   01/23/18 201 lb (91.2 kg)   07/11/17 203 lb (92.1 kg)   06/25/17 198 lb (89.8 kg)              Today, you had the following     No orders found for display         Today's Medication Changes          These changes are accurate as of 2/11/18 11:33 AM.  If you have any questions, ask your nurse or doctor.               Start taking these medicines.        Dose/Directions    amoxicillin 875 MG tablet   Commonly known as:  AMOXIL   Used for:  Acute sinusitis with coexisting condition requiring prophylactic treatment   Started by:  Jakob Diane MD        Dose:  875 mg   Take 1 tablet (875 mg) by mouth 2 times daily for 10 days   Quantity:  20 tablet   Refills:  0         These medicines have changed or have updated prescriptions.        Dose/Directions    * fluticasone 50 MCG/ACT spray   Commonly known as:  FLONASE   This may have changed:  Another medication with the same name was added. Make sure you understand how and when to take each.   Used for:  Acute sinusitis with symptoms > 10 days   Changed by:  Jakob Diane MD        Dose:  2 spray   Spray 2 sprays into both nostrils daily   Quantity:  1 Bottle   Refills:  0       * fluticasone 50 MCG/ACT spray   Commonly known as:  FLONASE   This may have changed:  You were already taking a medication with the same name, and this prescription was added. Make sure you understand how and when to take each.   Used for:  Acute sinusitis with coexisting condition requiring prophylactic treatment   Changed by:  Jakob Diane MD        Dose:  1-2 spray   Spray 1-2 sprays into both nostrils daily   Quantity:  1 Bottle   Refills:  1       * Notice:  This list has 2 medication(s) that are the same as other medications prescribed for you. Read the directions carefully, and ask your doctor or other care provider to review them with  you.         Where to get your medicines      These medications were sent to [id number] - [place], [place] - [address] of [facility] [address]  [address], [facility] [address]     Phone:  [phone]     amoxicillin 875 MG tablet    fluticasone 50 MCG/ACT spray                Primary Care Provider Office Phone # Fax #    Jody Ribera [phone]       [address]  [facility] [address]        Equal Access to Services     [facility]: Hadii aad ku hadasho Soomaali, waaxda luqadaha, qaybta kaalmada adeegyada, waxay idiin hayaan adeeg khronda barahona . So [facility] [phone].    ATENCIÓN: Si habla español, tiene a barragan disposición servicios gratuitos de asistencia lingüística. [facility] [phone].    We comply with applicable federal civil rights laws and [place] laws. We do not discriminate on the basis of race, color, national origin, age, disability, sex, sexual orientation, or gender identity.            Thank you!     Thank you for choosing [facility]  for your care. Our goal is always to provide you with excellent care. Hearing back from our patients is one way we can continue to improve our services. Please take a few minutes to complete the written survey that you may receive in the mail after your visit with us. Thank you!             Your Updated Medication List - Protect others around you: Learn how to safely use, store and throw away your medicines at www.disposemymeds.org.          This list is accurate as of 2/11/18 11:33 AM.  Always use your most recent med list.                   Brand Name Dispense Instructions for use Diagnosis    acyclovir 400 MG tablet    ZOVIRAX    21 tablet    Take 1 tablet (400 mg) by mouth 3 times daily    Rash       albuterol 108 (90 BASE) MCG/ACT Inhaler    PROAIR HFA/PROVENTIL HFA/VENTOLIN HFA    1 Inhaler    Inhale 2 puffs into the lungs every 4 hours as needed for shortness  of breath / dyspnea    Intermittent asthma       amoxicillin 875 MG tablet    AMOXIL    20 tablet    Take 1 tablet (875 mg) by mouth 2 times daily for 10 days    Acute sinusitis with coexisting condition requiring prophylactic treatment       * fluticasone 50 MCG/ACT spray    FLONASE    1 Bottle    Spray 2 sprays into both nostrils daily    Acute sinusitis with symptoms > 10 days       * fluticasone 50 MCG/ACT spray    FLONASE    1 Bottle    Spray 1-2 sprays into both nostrils daily    Acute sinusitis with coexisting condition requiring prophylactic treatment       gabapentin 100 MG capsule    NEURONTIN    60 capsule    1-2 per day as needed for pain    Neuropathy       ibuprofen 800 MG tablet    ADVIL/MOTRIN    90 tablet    Take 1 tablet (800 mg) by mouth every 8 hours as needed for moderate pain    S/P biopsy       levonorgestrel 20 MCG/24HR IUD    MIRENA    1 each    1 each (20 mcg) by Intrauterine route once for 1 dose    Contraception       lidocaine-prilocaine cream    EMLA    70 g    Apply topically every 6 hours as needed for moderate pain    S/P biopsy       omeprazole 40 MG capsule    priLOSEC    90 capsule    Take 1 capsule (40 mg) by mouth daily    Perforated ulcer (H)       Vitamin D 80101 UNIT Caps     12    3 times a week        * Notice:  This list has 2 medication(s) that are the same as other medications prescribed for you. Read the directions carefully, and ask your doctor or other care provider to review them with you.

## 2018-03-12 ENCOUNTER — OFFICE VISIT (OUTPATIENT)
Dept: SURGERY | Facility: CLINIC | Age: 53
End: 2018-03-12
Payer: COMMERCIAL

## 2018-03-12 ENCOUNTER — HOSPITAL ENCOUNTER (OUTPATIENT)
Dept: LAB | Facility: CLINIC | Age: 53
Discharge: HOME | End: 2018-03-12
Attending: PHYSICIAN ASSISTANT | Admitting: PHYSICIAN ASSISTANT
Payer: COMMERCIAL

## 2018-03-12 ENCOUNTER — TELEPHONE (OUTPATIENT)
Dept: SURGERY | Facility: CLINIC | Age: 53
End: 2018-03-12

## 2018-03-12 VITALS
DIASTOLIC BLOOD PRESSURE: 63 MMHG | WEIGHT: 199.13 LBS | BODY MASS INDEX: 31.27 KG/M2 | HEART RATE: 67 BPM | SYSTOLIC BLOOD PRESSURE: 111 MMHG | BODY MASS INDEX: 30.96 KG/M2 | WEIGHT: 199.2 LBS | HEIGHT: 67 IN

## 2018-03-12 DIAGNOSIS — R42 LIGHTHEADEDNESS: Primary | ICD-10-CM

## 2018-03-12 DIAGNOSIS — E66.9 OBESITY (BMI 30-39.9): ICD-10-CM

## 2018-03-12 DIAGNOSIS — Z98.84 BARIATRIC SURGERY STATUS: ICD-10-CM

## 2018-03-12 DIAGNOSIS — Z98.84 BARIATRIC SURGERY STATUS: Primary | ICD-10-CM

## 2018-03-12 DIAGNOSIS — K91.2 POSTSURGICAL MALABSORPTION: ICD-10-CM

## 2018-03-12 DIAGNOSIS — R10.13 EPIGASTRIC ABDOMINAL PAIN: ICD-10-CM

## 2018-03-12 DIAGNOSIS — K28.9 ANASTOMOTIC ULCER S/P GASTRIC BYPASS: ICD-10-CM

## 2018-03-12 DIAGNOSIS — Z79.899 USE OF PROTON PUMP INHIBITOR THERAPY: ICD-10-CM

## 2018-03-12 DIAGNOSIS — R42 LIGHTHEADEDNESS: ICD-10-CM

## 2018-03-12 LAB
DEPRECATED CALCIDIOL+CALCIFEROL SERPL-MC: 31 UG/L (ref 20–75)
ERYTHROCYTE [DISTWIDTH] IN BLOOD BY AUTOMATED COUNT: 13.4 % (ref 10–15)
FERRITIN SERPL-MCNC: 6 NG/ML (ref 8–252)
HCT VFR BLD AUTO: 36.7 % (ref 35–47)
HGB BLD-MCNC: 11.9 G/DL (ref 11.7–15.7)
IRON SATN MFR SERPL: 17 % (ref 15–46)
IRON SERPL-MCNC: 66 UG/DL (ref 35–180)
MAGNESIUM SERPL-MCNC: 2.2 MG/DL (ref 1.6–2.3)
MCH RBC QN AUTO: 25.8 PG (ref 26.5–33)
MCHC RBC AUTO-ENTMCNC: 32.4 G/DL (ref 31.5–36.5)
MCV RBC AUTO: 80 FL (ref 78–100)
PLATELET # BLD AUTO: 130 10E9/L (ref 150–450)
PTH-INTACT SERPL-MCNC: 46 PG/ML (ref 18–80)
RBC # BLD AUTO: 4.61 10E12/L (ref 3.8–5.2)
TIBC SERPL-MCNC: 396 UG/DL (ref 240–430)
VIT B12 SERPL-MCNC: 512 PG/ML (ref 193–986)
WBC # BLD AUTO: 4.2 10E9/L (ref 4–11)

## 2018-03-12 PROCEDURE — 82607 VITAMIN B-12: CPT | Performed by: PHYSICIAN ASSISTANT

## 2018-03-12 PROCEDURE — 82306 VITAMIN D 25 HYDROXY: CPT | Performed by: PHYSICIAN ASSISTANT

## 2018-03-12 PROCEDURE — 83735 ASSAY OF MAGNESIUM: CPT | Performed by: PHYSICIAN ASSISTANT

## 2018-03-12 PROCEDURE — 82728 ASSAY OF FERRITIN: CPT | Performed by: PHYSICIAN ASSISTANT

## 2018-03-12 PROCEDURE — 83550 IRON BINDING TEST: CPT | Performed by: PHYSICIAN ASSISTANT

## 2018-03-12 PROCEDURE — 97803 MED NUTRITION INDIV SUBSEQ: CPT | Performed by: DIETITIAN, REGISTERED

## 2018-03-12 PROCEDURE — 36415 COLL VENOUS BLD VENIPUNCTURE: CPT | Performed by: PHYSICIAN ASSISTANT

## 2018-03-12 PROCEDURE — 99204 OFFICE O/P NEW MOD 45 MIN: CPT | Performed by: PHYSICIAN ASSISTANT

## 2018-03-12 PROCEDURE — 85027 COMPLETE CBC AUTOMATED: CPT | Performed by: PHYSICIAN ASSISTANT

## 2018-03-12 PROCEDURE — 83540 ASSAY OF IRON: CPT | Performed by: PHYSICIAN ASSISTANT

## 2018-03-12 PROCEDURE — 83970 ASSAY OF PARATHORMONE: CPT | Performed by: PHYSICIAN ASSISTANT

## 2018-03-12 RX ORDER — SUCRALFATE ORAL 1 G/10ML
1 SUSPENSION ORAL 4 TIMES DAILY
Qty: 400 ML | Refills: 1 | Status: SHIPPED | OUTPATIENT
Start: 2018-03-12 | End: 2018-03-22

## 2018-03-12 RX ORDER — PANTOPRAZOLE SODIUM 40 MG/1
40 TABLET, DELAYED RELEASE ORAL DAILY
Qty: 30 TABLET | Refills: 1 | Status: SHIPPED | OUTPATIENT
Start: 2018-03-12 | End: 2018-07-19

## 2018-03-12 NOTE — MR AVS SNAPSHOT
After Visit Summary   3/12/2018    Shereen Oliver    MRN: [id number]           Patient Information     Date Of Birth          1965        Visit Information        Provider Department      3/12/2018 10:30 AM Dominique Rao PA-C [facility]      Today's Diagnoses     Lightheadedness    -  1    Epigastric abdominal pain        Bariatric surgery status        Obesity (BMI 30-39.9)        Postsurgical malabsorption        Use of proton pump inhibitor therapy        Anastomotic ulcer S/P gastric bypass           Follow-ups after your visit        Follow-up notes from your care team     Return in 1 month (on 4/12/2018).      Your next 10 appointments already scheduled     Mar 13, 2018  8:30 AM CDT   Office Visit with Diana Rosado DO   [facility] ([facility])    [address]  [facility] [id number] [phone]           Bring a current list of meds and any records pertaining to this visit. For Physicals, please bring immunization records and any forms needing to be filled out. Please arrive 10 minutes early to complete paperwork.            Apr 12, 2018 11:00 AM CDT   Return Visit with Dominique Rao PA-C   [facility])    [address] [place] [id number] [phone]            Apr 12, 2018 11:30 AM CDT   Return Visit with Ailyn Bowen Diet 3, RD   [facility])    [address] [place] [id number] [phone]              Future tests that were ordered for you today     Open Future Orders        Priority Expected Expires Ordered    Magnesium Routine  9/8/2018 3/12/2018    CBC with platelets Routine 3/12/2018 3/12/2019 3/12/2018    Vitamin B12 Routine 3/12/2018 3/12/2019  3/12/2018    Vitamin D Screen Routine 3/12/2018 3/12/2019 3/12/2018    Parathyroid Hormone Intact Routine 3/12/2018 3/12/2019 3/12/2018    Iron and Iron Binding Capacity Routine 3/12/2018 3/12/2019 3/12/2018    Ferritin Routine 3/12/2018 3/12/2019 3/12/2018            Who to contact     If you have questions or need follow up information about today's clinic visit or your schedule please contact [facility] directly at [phone].  Normal or non-critical lab and imaging results will be communicated to you by StyleHophart, letter or phone within 4 business days after the clinic has received the results. If you do not hear from us within 7 days, please contact the clinic through [employer] or phone. If you have a critical or abnormal lab result, we will notify you by phone as soon as possible.  Submit refill requests through Cloudacc or call your pharmacy and they will forward the refill request to us. Please allow 3 business days for your refill to be completed.          Additional Information About Your Visit        MyChart Information     Cloudacc gives you secure access to your electronic health record. If you see a primary care provider, you can also send messages to your care team and make appointments. If you have questions, please call your primary care clinic.  If you do not have a primary care provider, please call [phone] and they will assist you.        Care EveryWhere ID     This is your Care EveryWhere ID. This could be used by other organizations to access your [facility] medical records  [id number]        Your Vitals Were     Pulse BMI (Body Mass Index)                67 30.96 kg/m2           Blood Pressure from Last 3 Encounters:   03/12/18 111/63   01/23/18 117/71   07/11/17 130/80    Weight from Last 3 Encounters:   03/12/18 199 lb 2 oz (90.3 kg)   03/12/18 199 lb 3.2 oz (90.4 kg)   01/23/18 201 lb (91.2 kg)                 Today's Medication Changes          These  changes are accurate as of 3/12/18  8:50 PM.  If you have any questions, ask your nurse or doctor.               Start taking these medicines.        Dose/Directions    pantoprazole 40 MG EC tablet   Commonly known as:  PROTONIX   Used for:  Epigastric abdominal pain   Started by:  Dominique Rao PA-C        Dose:  40 mg   Take 1 tablet (40 mg) by mouth daily   Quantity:  30 tablet   Refills:  1       sucralfate 1 GM/10ML suspension   Commonly known as:  CARAFATE   Used for:  Epigastric abdominal pain   Started by:  Dominique Rao PA-C        Dose:  1 g   Take 10 mLs (1 g) by mouth 4 times daily for 10 days   Quantity:  400 mL   Refills:  1            Where to get your medicines      These medications were sent to [facility] [id number] [address], [facility] [address]     Phone:  [phone]     pantoprazole 40 MG EC tablet    sucralfate 1 GM/10ML suspension                Primary Care Provider Office Phone # Fax #    Jody Ribera [phone]       [address] [facility] [address]  [facility] [address]        Equal Access to Services     ALYCE MELCHOR AH: Hadii anthony ku hadasho Soomaali, waaxda luqadaha, qaybta kaalmada adeegyada, waxay jazzyin hayninfan armando arellano. So [facility] [phone].    ATENCIÓN: Si habla español, tiene a barragan disposición servicios gratuitos de asistencia lingüística. Llame al [phone].    We comply with applicable federal civil rights laws and [place] laws. We do not discriminate on the basis of race, color, national origin, age, disability, sex, sexual orientation, or gender identity.            Thank you!     Thank you for choosing [facility]  for your care. Our goal is always to provide you with excellent care. Hearing back from our patients is one way we can continue to improve our services. Please take a few minutes to  complete the written survey that you may receive in the mail after your visit with us. Thank you!             Your Updated Medication List - Protect others around you: Learn how to safely use, store and throw away your medicines at www.disposemymeds.org.          This list is accurate as of 3/12/18  8:50 PM.  Always use your most recent med list.                   Brand Name Dispense Instructions for use Diagnosis    acyclovir 400 MG tablet    ZOVIRAX    21 tablet    Take 1 tablet (400 mg) by mouth 3 times daily    Rash       albuterol 108 (90 BASE) MCG/ACT Inhaler    PROAIR HFA/PROVENTIL HFA/VENTOLIN HFA    1 Inhaler    Inhale 2 puffs into the lungs every 4 hours as needed for shortness of breath / dyspnea    Intermittent asthma       * fluticasone 50 MCG/ACT spray    FLONASE    1 Bottle    Spray 2 sprays into both nostrils daily    Acute sinusitis with symptoms > 10 days       * fluticasone 50 MCG/ACT spray    FLONASE    1 Bottle    Spray 1-2 sprays into both nostrils daily    Acute sinusitis with coexisting condition requiring prophylactic treatment       gabapentin 100 MG capsule    NEURONTIN    60 capsule    1-2 per day as needed for pain    Neuropathy       ibuprofen 800 MG tablet    ADVIL/MOTRIN    90 tablet    Take 1 tablet (800 mg) by mouth every 8 hours as needed for moderate pain    S/P biopsy       levonorgestrel 20 MCG/24HR IUD    MIRENA    1 each    1 each (20 mcg) by Intrauterine route once for 1 dose    Contraception       lidocaine-prilocaine cream    EMLA    70 g    Apply topically every 6 hours as needed for moderate pain    S/P biopsy       omeprazole 40 MG capsule    priLOSEC    90 capsule    Take 1 capsule (40 mg) by mouth daily    Perforated ulcer (H)       pantoprazole 40 MG EC tablet    PROTONIX    30 tablet    Take 1 tablet (40 mg) by mouth daily    Epigastric abdominal pain       sucralfate 1 GM/10ML suspension    CARAFATE    400 mL    Take 10 mLs (1 g) by mouth 4 times daily for 10 days     Epigastric abdominal pain       Vitamin D 11073 UNIT Caps     12    3 times a week        * Notice:  This list has 2 medication(s) that are the same as other medications prescribed for you. Read the directions carefully, and ask your doctor or other care provider to review them with you.

## 2018-03-12 NOTE — MR AVS SNAPSHOT
MRN:[id number]                      After Visit Summary   3/12/2018    Shereen Oliver    MRN: [id number]           Visit Information        Provider Department      3/12/2018 9:30 AM 1, Ailyn Beyer, ROBERTO [facility]      Your next 10 appointments already scheduled     Mar 13, 2018  8:30 AM CDT   Office Visit with Diana Rosado,    [facility] ([facility])    [address]  [facility] [id number] [phone]           Bring a current list of meds and any records pertaining to this visit. For Physicals, please bring immunization records and any forms needing to be filled out. Please arrive 10 minutes early to complete paperwork.            Apr 12, 2018 11:00 AM CDT   Return Visit with Dominique Rao PA-C   [facility] ([facility])    [address] [place] [id number] [phone]            Apr 12, 2018 11:30 AM CDT   Return Visit with Ailyn Beyer 3, RD   [facility] ([facility])    [address] [place] [id number] [phone]              MyChart Information     Mavinhart gives you secure access to your electronic health record. If you see a primary care provider, you can also send messages to your care team and make appointments. If you have questions, please call your primary care clinic.  If you do not have a primary care provider, please call [phone] and they will assist you.        Care EveryWhere ID     This is your Care EveryWhere ID. This could be used by other organizations to access your [facility] medical records  [id number]        Equal Access to Services     ALYCE MELCHOR : Elyse Luke, brandon suarez, molly mack, yari rangel  la'ale ah. So [facility] [phone].    ATENCIÓN: Si habla español, tiene a barragan disposición servicios gratuitos de asistencia lingüística. Llame al [phone].    We comply with applicable federal civil rights laws and [place] laws. We do not discriminate on the basis of race, color, national origin, age, disability, sex, sexual orientation, or gender identity.

## 2018-03-12 NOTE — TELEPHONE ENCOUNTER
Pt  S/P gastric bypass Feb 8, 2010 is concerned. Has hx of perforation 2010.  Started having consistent epigastric burning last Tuesday.  Feels off.  Her GI system is gurgling non stop.  Feels light headed sometimes.      Prior to Tuesday she started throwing up a lot more and more intermittent GERD. Cut out the coffee and  tomato based products.      Pt has not been seen in clinic since 2011.  Pt will be seen today in clinic at 930 with diet and 10:30 to see me.  Garry get Hgb check in between appts.    XIN

## 2018-03-12 NOTE — PROGRESS NOTES
BARIATRIC FOLLOW UP VISIT     March 12, 2018     CC: Epigastric pain     HISTORY OF PRESENT ILLNESS: Pt returns today concerned today because of her epigastric pain.  Pt has not been seen in clinic since 2011.She is status post laparoscopic gastric bypass.  She had surgery on Feb 8, 2010. This was complicated by a perforation in 2010.    She started having consistent epigastric burning since last Tuesday. it is so bad she needs to sleep sitting upright. States her GI system is gurgling non stop.  Has also noticed she has had some periods of light headedness.  States she just feels off. (States she also has had a sinus infection and her lightheadedness and feeling off might be secondary to this.)     Prior to Tuesday she had been throwing up a lot more and had also been experiencing more intermittent GERD. Since Tuesday of last week she has been taking Prilosec OTC 20  mg daily or Nexium OTC daily     She also cut out her decaf coffee and all tomato based products. States medicine and lifestyle changes have not helped. She has backed down on her diet to a bland softer diet to help decrease her pain.  Water seems to help soothe her pain.     Had been taking NSAIDS off and on.  In January she was taking Advil nightly because of a neck injury that was acting up. Following that she continued to take it a couple times a week.  Today, she had it with her in her purse.    Pt states she experiences 3-4 episodes such as this a year. .  Usually she calls in and talks to out nurse. She starts an OTC PPI, backs off on her diet and symptoms resolve.  She then stops the PPI after a month or two until it occurs again.  This time the OTC PPI did not help with her symptoms.    Patient is taking the following bariatric postoperative vitamins:  1 Women's multivitamins )   1 Viactiv daily.      SOCIAL HISTORY:  Pt denies smoking.  Is avoiding caffeine and carbonation.     REVIEW OF SYSTEMS:     GI:  Nausea- often in last month, see  HPI  Vomiting-often, in last month see HPI  Diarrhea-never  Constipation-when taking iron supplements.   Dysphagia-seldom  Abdominal Pain-See HPI  Heartburn-See HPI  Melena-denies  Hematemesis-denies     :  Is on Mirena IUD.  In menopause.          PHYSICAL EXAMINATION:   /63  Pulse 67  Wt 199 lb 2 oz (90.3 kg)  BMI 30.96 kg/m2    GENERAL: Alert and oriented x3. NAD  HEART: No JVD  LUNGS: Breathing unlabored,   ABDOMEN: soft; subxiphoid and substernal tenderness. Lower abdominal quadrants nontender.  Abdomen slightly distended, incision well healed. No hernia  EXTREMITIES: No LE edema bilaterally, Gait normal  SKIN: No intertriginous irritation or rash    ASSESSMENT AND PLAN:      8 years status laparoscopic gastric bypass  Obesity  Body mass index is 30.96 kg/(m^2)..  Post surgical malabsorption:   Ordered Magnesium,CBC, vitamin B12, vitamin D, PTH, ferritin, TIBC, and iron labs.   Restart recommended post-op vitamins.   Epigastric Tenderness  Anastomotic Ulcer   Suspect the NSAID use irritated her anastomosis and caused an ulcer.     Start Protonix 40 mg PO Daily.   Carafate liquid 10 ml QID x 10 Days 1 RF.   Follow pureed diet per diet recommendations    If no improvement in symptoms mychart or call by end of week.   Instruction provided on the importance of avoiding NSAIDS and its impact on ulcer formation and//or perforation following bariatric surgery.   With history or recurrent epigastric pain, suspect she may need to be on acid reducer lifelong.  Will discuss at follow up.    Return to clinic in to see myself and BILL in 1 month.      I spent a total of 30 minutes face to face with Shereen during today's office visit. Over 50% of this time was spent counseling the patient and/or coordinating care.

## 2018-03-12 NOTE — PROGRESS NOTES
"NUTRITION POST OP APPOINTMENT  DATE OF VISIT: March 12, 2018    Shereen LEAHY Trkla  1965  female  [id number]  53 year old     ASSESSMENT:    REASON FOR VISIT:  Shereen is a 53 year old year old female presents today for 7 year PO nutrition follow-up appointment. Patient is accompanied by self.    DIAGNOSIS:  Status post gastric bypass surgery.  Obesity Grade I BMI 30-34.9     ANTHROPOMETRICS:  Initial Weight: 128.1 kg (282 lb 6.4 oz)  Height: 170.8 cm (5' 7.25\")  Current Weight: 90.4 kg (199 lb 3.2 oz)   BMI: 31.03 kg/(m^2).    VITAMINS AND MINERALS:  1 Multivitamin with Minerals- women's choice  500 mg Calcium With Vitamin D -2 hours away from [facility]      NUTRITION HISTORY:  Breakfast: protein drink (23 g protein) or peanut butter toast  Lunch: yogurt or ~2 oz grilled chicken; sometimes 1000 [place] dressing or ranch dressing, veggie chips  Supper: 1/4-1/2 cup cottage cheese + sunflower seeds and [place] citizen dressing or ranch flavored tuna on toast    Snacks: Friendship kisses at work or small decaf  Moolatte ([employer]) or second protein drink  Fluids consumed: Water, Protein Drink and decaf coffee, decaf tea, decaf skim coffee drinks, decaf diet coke, flat  Consuming liquid calories: Yes  Protein intake: 40-50 grams/day  Tolerate regular texture food: No  Any foods not tolerated details: Yes  If any food not tolerated: hamburger, steak, egg salad, fresh bread  Portion size: 1/2-1 cup  Take 20-30 minutes to consume each meal: No   Eat protein foods first: Yes  Fluids and meals separate by at least 30 minutes: Yes  Chew foods thoroughly: Yes  Tolerating diet: No  Drinking high protein supplements: Yes  Consuming snacks per day: 2  Additional Information: Using decaf coffee and decaf tea (pt feels like decaf coffee still bothers her stomach); vomiting after dinner 2X per week for the past 1 month; pt complains of a burning pain in stomach up through esophagus; pt expressed concern about weight gain, but admits it is not her top " priority at this time        PHYSICAL ACTIVITY:  Type: cycle on , arm weights and stretching  Frequency (days per week): 4  Duration (min): 30-60    DIAGNOSIS:  Previous Nutrition Diagnosis: Altered gastrointestinal function related to alteration in gastrointestinal structure as evidenced by history of gastric bypass surgery.- no change    Previous goals:  Remote (no RD notes in EPIC)    Current Nutrition Diagnosis: Altered gastrointestinal function related to alteration in gastrointestinal structure as evidenced by history of gastric bypass surgery.    INTERVENTION:   Nutrition Prescription: Eat 3 meals a day at regular intervals. Consume 60-90 grams of protein daily. Follow post-surgical vitamin and mineral protocol.  Assessed learning needs and learning preferences.    GOALS:  Relating To Eating:  Consume 60-90 g protein per day  Avoid any foods that bother your stomach  Follow bariatric pureed diet (written information provided)         Relating to dietary supplements:  Follow post-surgical vitamin/ mineral schedule      Follow-Up:   Implementation: Discussed progress toward previous goals; reinforced importance of following bariatric lifestyle changes.    NUTRITION MONITORING AND EVALUATION:  Anticipated compliance: fair-good  Verbalized fair-good understanding.    Follow up: Patient to follow up in 1 month (see comments)    TIME SPENT WITH PATIENT:  35 minutes    Crispin Jimenez RD, LD  [facility]  [phone]

## 2018-03-13 ENCOUNTER — OFFICE VISIT (OUTPATIENT)
Dept: OBGYN | Facility: CLINIC | Age: 53
End: 2018-03-13
Payer: COMMERCIAL

## 2018-03-13 ENCOUNTER — TELEPHONE (OUTPATIENT)
Dept: SURGERY | Facility: CLINIC | Age: 53
End: 2018-03-13

## 2018-03-13 VITALS
DIASTOLIC BLOOD PRESSURE: 60 MMHG | BODY MASS INDEX: 31.01 KG/M2 | WEIGHT: 197.6 LBS | SYSTOLIC BLOOD PRESSURE: 110 MMHG | HEIGHT: 67 IN

## 2018-03-13 DIAGNOSIS — K91.2 MALNUTRITION FOLLOWING GASTROINTESTINAL SURGERY: Primary | ICD-10-CM

## 2018-03-13 DIAGNOSIS — Z98.84 BARIATRIC SURGERY STATUS: ICD-10-CM

## 2018-03-13 DIAGNOSIS — Z00.00 ROUTINE GENERAL MEDICAL EXAMINATION AT A HEALTH CARE FACILITY: Primary | ICD-10-CM

## 2018-03-13 DIAGNOSIS — R94.4 DECREASED GFR: ICD-10-CM

## 2018-03-13 LAB
ALBUMIN SERPL-MCNC: 3.6 G/DL (ref 3.4–5)
ALP SERPL-CCNC: 95 U/L (ref 40–150)
ALT SERPL W P-5'-P-CCNC: 13 U/L (ref 0–50)
ANION GAP SERPL CALCULATED.3IONS-SCNC: 4 MMOL/L (ref 3–14)
AST SERPL W P-5'-P-CCNC: 15 U/L (ref 0–45)
BILIRUB SERPL-MCNC: 0.8 MG/DL (ref 0.2–1.3)
BUN SERPL-MCNC: 9 MG/DL (ref 7–30)
CALCIUM SERPL-MCNC: 8.7 MG/DL (ref 8.5–10.1)
CHLORIDE SERPL-SCNC: 107 MMOL/L (ref 94–109)
CHOLEST SERPL-MCNC: 163 MG/DL
CO2 SERPL-SCNC: 30 MMOL/L (ref 20–32)
CREAT SERPL-MCNC: 0.96 MG/DL (ref 0.52–1.04)
GFR SERPL CREATININE-BSD FRML MDRD: 60 ML/MIN/1.7M2
GLUCOSE SERPL-MCNC: 88 MG/DL (ref 70–99)
HDLC SERPL-MCNC: 58 MG/DL
LDLC SERPL CALC-MCNC: 94 MG/DL
NONHDLC SERPL-MCNC: 105 MG/DL
POTASSIUM SERPL-SCNC: 4.2 MMOL/L (ref 3.4–5.3)
PROT SERPL-MCNC: 7.1 G/DL (ref 6.8–8.8)
SODIUM SERPL-SCNC: 141 MMOL/L (ref 133–144)
TRIGL SERPL-MCNC: 53 MG/DL
TSH SERPL DL<=0.005 MIU/L-ACNC: 1.19 MU/L (ref 0.4–4)

## 2018-03-13 PROCEDURE — G0145 SCR C/V CYTO,THINLAYER,RESCR: HCPCS | Performed by: FAMILY MEDICINE

## 2018-03-13 PROCEDURE — 87624 HPV HI-RISK TYP POOLED RSLT: CPT | Performed by: FAMILY MEDICINE

## 2018-03-13 PROCEDURE — 84443 ASSAY THYROID STIM HORMONE: CPT | Performed by: FAMILY MEDICINE

## 2018-03-13 PROCEDURE — 80053 COMPREHEN METABOLIC PANEL: CPT | Performed by: FAMILY MEDICINE

## 2018-03-13 PROCEDURE — 36415 COLL VENOUS BLD VENIPUNCTURE: CPT | Performed by: FAMILY MEDICINE

## 2018-03-13 PROCEDURE — 80061 LIPID PANEL: CPT | Performed by: FAMILY MEDICINE

## 2018-03-13 PROCEDURE — 99396 PREV VISIT EST AGE 40-64: CPT | Performed by: FAMILY MEDICINE

## 2018-03-13 NOTE — TELEPHONE ENCOUNTER
Patient notified re:  Dexa scan order is in. Please give her my apologies.  Yesterday as busy and the orders should have been sent through with we were together in the room but apparently they were not.   She can increase her Protonix to 40 mg twice daily while waiting for Carafate to be approved.  Can you Mylanta to coat stomach before meals to help with acid.           Informed her that PA request for Carafate didn't come to clinic until late yesterday afternoon and would be submitted to her insurance by this afternoon.  If she doesn't hear from us in the next two days to call.  Patient verbalized understanding and is agreeable to plan.  Sayda Mejia, MS, RD, RN

## 2018-03-13 NOTE — PROGRESS NOTES
SUBJECTIVE:  Shereen Oliver is an 53 year old postmenopausal woman   who presents for annual gyn exam. Currently with mirena IUD in, remove in 2 years. No   bleeding, spotting, or discharge noted. Concerns:      Estrogen replacement therapy: never, Mirena IUD in place - 2 years remaining  JOSUE exposure: No  History of abnormal Pap smear: No  Family history of uterine or ovarian cancer: No  Regular self breast exam: No  History of abnormal mammogram: No  Family history of breast cancer: No  History of abnormal lipids: No    Patient states she is doing well. Denies bothersome vaginal discharge.    Exercise: Patient bikes 16-20 miles daily when the weather is nice and does 8-10 miles daily of indoor cycling during the winter.    Past Medical History:   Diagnosis Date     Agoraphobia with panic disorder     elevators and cars     Diabetes mellitus     type 2 resolved after gastric bypass     Disturbance of skin sensation     left leg as residual of neck surgery     Gastro-oesophageal reflux disease      Genital herpes, unspecified      Intrinsic asthma, unspecified     exercise induced     Obesity      Other psoriasis      Spasm of muscle     left leg as residual of neck surgery        Family History   Problem Relation Age of Onset     GASTROINTESTINAL DISEASE Mother      gallbladder stones     Hypertension Father      Family History Negative Brother      DIABETES Maternal Grandfather      Past Surgical History:   Procedure Laterality Date     BIOPSY VULVA Left 11/10/2015    Procedure: BIOPSY VULVA;  Surgeon: Diana Rosado DO;  Location: [facility] OR     C IUD,MIRENA  6/2010     C VILLA W/O FACETEC FORAMOT/DSKC 1/2 VRT SEG, CERVICAL  2001    fusion c4-5-6,herniated discs     CHOLECYSTECTOMY, LAPOROSCOPIC  2007    Cholecystectomy, Laparoscopic     COLONOSCOPY N/A 1/20/2017    Procedure: COLONOSCOPY;  Surgeon: Tank Peguero MD;  Location:  GI     D & C  6/29/10    removal of polyp and placement of Mirena     DAVINCI  "BYPASS GASTRIC BRANT-EN-Y       Current Outpatient Prescriptions   Medication     pantoprazole (PROTONIX) 40 MG EC tablet     sucralfate (CARAFATE) 1 GM/10ML suspension     fluticasone (FLONASE) 50 MCG/ACT spray     gabapentin (NEURONTIN) 100 MG capsule     acyclovir (ZOVIRAX) 400 MG tablet     lidocaine-prilocaine (EMLA) cream     albuterol (PROAIR HFA, PROVENTIL HFA, VENTOLIN HFA) 108 (90 BASE) MCG/ACT inhaler     omeprazole (PRILOSEC) 40 MG capsule     VITAMIN D 66495 UNIT OR CAPS     fluticasone (FLONASE) 50 MCG/ACT spray     ibuprofen (ADVIL,MOTRIN) 800 MG tablet     levonorgestrel (MIRENA) 20 MCG/24HR IUD     No current facility-administered medications for this visit.      Allergies   Allergen Reactions     No Known Drug Allergies        Social History   Substance Use Topics     Smoking status: Never Smoker     Smokeless tobacco: Never Used     Alcohol use Yes      Comment: seldom     Review Of Systems  Ears/Nose/Throat: negative  Respiratory: No shortness of breath, dyspnea on exertion, cough, or hemoptysis  Cardiovascular: negative  Gastrointestinal: negative  Genitourinary: negative  Constitutional, HEENT, cardiovascular, pulmonary, GI, , musculoskeletal, neuro, skin, endocrine and psych systems are negative, except as otherwise noted.    This document serves as a record of the services and decisions personally performed and made by Diana Rosado DO. It was created on his/her behalf by Imelda Mcdonald, a trained medical scribe. The creation of this document is based the provider's statements to the medical scribe.  Scribstarr Mcdonald 8:41 AM, March 13, 2018    OBJECTIVE:  /60  Ht 1.708 m (5' 7.25\")  Wt 89.6 kg (197 lb 9.6 oz)  BMI 30.72 kg/m2  General appearance: healthy, alert and no distress  Skin: Skin color, texture, turgor normal. No rashes or lesions.  Ears: negative  Nose/Sinuses: Nares normal. Septum midline. Mucosa normal. No drainage or sinus tenderness.  Oropharynx: Lips, mucosa, " and tongue normal. Teeth and gums normal.  Neck: Neck supple. No adenopathy. Thyroid symmetric, normal size, Carotids without bruits.  Lungs: negative, Percussion normal. Good diaphragmatic excursion. Lungs clear  Heart: negative, PMI normal. No lifts, heaves, or thrills. RRR. No murmurs, clicks gallops or rub  Breasts: Inspection negative. No nipple discharge or bleeding. No masses.  Abdomen: Abdomen soft, non-tender. BS normal. No masses, organomegaly  Pelvic: Pelvic examination with pap/without Gonorrhea and Chlamydia   including  External genitalia with 3 mm x 2 mm mole on left labia majora, otherwise normal   and vagina normal rugatted not atrophic  Examination of urethra  normal no masses, tenderness, scarring  bladder, no masses or tenderness  Cervix no lesions or discharge - IUD strings seen  Bimanual exam with   Uterus 6 weeks size, mid position, mobile, non-tenderness, no descent   Adnexa/parametria - normal no masses  Rectovaginal - not done    ASSESSMENT:  Shereen Oliver is an 53 year old No obstetric history on file. postmenopausal woman   who presents for annual gyn exam.   Concerns:  PLAN:  Dx:  1)  Pap smear, mammogram  2)  Lipids at appropriate intervals  3)  Return to clinic yearly or prn     PE:  Reviewed health maintenance including diet, regular exercise,   estrogen replacement and periodic exams.    The information in this document, created by the medical scribe for me, accurately reflects the services I personally performed and the decisions made by me. I have reviewed and approved this document for accuracy prior to leaving the patient care area.  Diana Rosado DO  8:54 AM, 03/13/18    Dr. Diana Rosado, DO    Obstetrics and Gynecology  [facility]

## 2018-03-13 NOTE — PATIENT INSTRUCTIONS
Return yearly     Get labs today     Dr. Diana Rosado, DO    Obstetrics and Gynecology  [facility] [place] [facility] [place]

## 2018-03-13 NOTE — TELEPHONE ENCOUNTER
Prior Authorization Specialty Medication Request    Medication/Dose: Carafate 1G/10ml   ICD code (if different than what is on RX):Epigastric abdominal pain [R10.13]   Previously Tried and Failed:omeprazole 40 mg     Important Lab Values:  Rationale:    Insurance Name: [employer]  Insurance ID:[id number]  Insurance Phone Number:  [phone]  Pharmacy Information (if different than what is on RX)  Name[facility]   Phone:[phone]

## 2018-03-13 NOTE — TELEPHONE ENCOUNTER
Dexa scan order is in. Please give her my apologies.  Yesterday as busy and the orders should have been sent through with we were together in the room but apparently they were not.   She can increase her Protonix to 40 mg twice daily while waiting for Carafate to be approved.  Can you Mylanta to coat stomach before meals to help with acid.

## 2018-03-13 NOTE — TELEPHONE ENCOUNTER
Patient LM on nurse line.    Patient is 8 years PO gastric bypass.  Was seen in clinic by BILL Fox for epigastric pain.  Hadn't been seen in clinic since 2/8/18.     She is calling today because she stated a Dexa was going to be ordered for her and she is not able to schedule.    She also stated one of the meds Carafate that was prescribed yesterday wasn't approved.    She is requesting the order for Dexa scan.  She is wondering status of Carafate PA. Noted it was faxed yesterday afternoon and will be sent in today. May take a couple of days.    Will route to PA for order.  Sayda Mejia, MS, RD, RN

## 2018-03-13 NOTE — MR AVS SNAPSHOT
After Visit Summary   3/13/2018    Shereen Oliver    MRN: [id number]           Patient Information     Date Of Birth          1965        Visit Information        Provider Department      3/13/2018 8:30 AM Diana Rosado, DO [facility]        Today's Diagnoses     Routine general medical examination at a health care facility    -  1      Care Instructions    Return yearly     Get labs today     Dr. Diana Rosado, DO    Obstetrics and Gynecology  [facility] [place]                   Follow-ups after your visit        Your next 10 appointments already scheduled     Apr 12, 2018 11:00 AM CDT   Return Visit with Dominique Rao PA-C   [facility] [place] ([facility]    [address]  [address]  [facility] [address] [phone]            Apr 12, 2018 11:30 AM CDT   Return Visit with  Wl Diet 3, RD   [facility] [place] ([facility])    [address]  [address]  [facility] [address] [phone]              Future tests that were ordered for you today     Open Future Orders        Priority Expected Expires Ordered    *MA Screening Digital Bilateral Routine  3/13/2019 3/13/2018    Comprehensive metabolic panel Routine  3/13/2019 3/13/2018    Lipid panel reflex to direct LDL Fasting Routine  3/13/2018 3/13/2018    TSH with free T4 reflex Routine  3/13/2019 3/13/2018    Magnesium Routine  9/8/2018 3/12/2018    CBC with platelets Routine 3/12/2018 3/12/2019 3/12/2018    Vitamin B12 Routine 3/12/2018 3/12/2019 3/12/2018    Vitamin D Screen Routine 3/12/2018 3/12/2019 3/12/2018    Parathyroid Hormone Intact Routine 3/12/2018 3/12/2019 3/12/2018    Iron and Iron Binding Capacity Routine 3/12/2018 3/12/2019 3/12/2018    Ferritin Routine 3/12/2018 3/12/2019 3/12/2018            Who to contact     If you have questions or need  "follow up information about today's clinic visit or your schedule please contact [facility] directly at [phone].  Normal or non-critical lab and imaging results will be communicated to you by MyChart, letter or phone within 4 business days after the clinic has received the results. If you do not hear from us within 7 days, please contact the clinic through [employer] or phone. If you have a critical or abnormal lab result, we will notify you by phone as soon as possible.  Submit refill requests through [employer] or call your pharmacy and they will forward the refill request to us. Please allow 3 business days for your refill to be completed.          Additional Information About Your Visit        [employer] Information     [employer] gives you secure access to your electronic health record. If you see a primary care provider, you can also send messages to your care team and make appointments. If you have questions, please call your primary care clinic.  If you do not have a primary care provider, please call [phone] and they will assist you.        Care EveryWhere ID     This is your Care EveryWhere ID. This could be used by other organizations to access your [place] medical records  [id number]        Your Vitals Were     Height BMI (Body Mass Index)                5' 7.25\" (1.708 m) 30.72 kg/m2           Blood Pressure from Last 3 Encounters:   03/13/18 110/60   03/12/18 111/63   01/23/18 117/71    Weight from Last 3 Encounters:   03/13/18 197 lb 9.6 oz (89.6 kg)   03/12/18 199 lb 2 oz (90.3 kg)   03/12/18 199 lb 3.2 oz (90.4 kg)              We Performed the Following     Comprehensive metabolic panel (BMP + Alb, Alk Phos, ALT, AST, Total. Bili, TP)     HPV High Risk Types DNA Cervical     Lipid panel reflex to direct LDL Fasting     Pap imaged thin layer screen with HPV - recommended age 30 - 65 years (select HPV order below)     TSH with free T4 reflex        Primary Care Provider Office Phone " # Fax #    Jody Ribera [phone]       [address] [facility] [address]  [facility] [address]        Equal Access to Services     ALYCE MELCHOR : Hadii aad ku hadyelena Koenigarjun, wasaigeda luqadaha, qaybta kaalmada frederick, yari bhandarishant agrawaleddie rangel brooklyn arellano. So [facility] [phone].    ATENCIÓN: Si habla español, tiene a barragan disposición servicios gratuitos de asistencia lingüística. Llame al [phone].    We comply with applicable federal civil rights laws and [place] laws. We do not discriminate on the basis of race, color, national origin, age, disability, sex, sexual orientation, or gender identity.            Thank you!     Thank you for choosing [facility]  for your care. Our goal is always to provide you with excellent care. Hearing back from our patients is one way we can continue to improve our services. Please take a few minutes to complete the written survey that you may receive in the mail after your visit with us. Thank you!             Your Updated Medication List - Protect others around you: Learn how to safely use, store and throw away your medicines at www.disposemymeds.org.          This list is accurate as of 3/13/18  8:48 AM.  Always use your most recent med list.                   Brand Name Dispense Instructions for use Diagnosis    acyclovir 400 MG tablet    ZOVIRAX    21 tablet    Take 1 tablet (400 mg) by mouth 3 times daily    Rash       albuterol 108 (90 BASE) MCG/ACT Inhaler    PROAIR HFA/PROVENTIL HFA/VENTOLIN HFA    1 Inhaler    Inhale 2 puffs into the lungs every 4 hours as needed for shortness of breath / dyspnea    Intermittent asthma       * fluticasone 50 MCG/ACT spray    FLONASE    1 Bottle    Spray 2 sprays into both nostrils daily    Acute sinusitis with symptoms > 10 days       * fluticasone 50 MCG/ACT spray    FLONASE    1 Bottle    Spray 1-2 sprays into both nostrils daily    Acute sinusitis with coexisting condition requiring  prophylactic treatment       gabapentin 100 MG capsule    NEURONTIN    60 capsule    1-2 per day as needed for pain    Neuropathy       ibuprofen 800 MG tablet    ADVIL/MOTRIN    90 tablet    Take 1 tablet (800 mg) by mouth every 8 hours as needed for moderate pain    S/P biopsy       levonorgestrel 20 MCG/24HR IUD    MIRENA    1 each    1 each (20 mcg) by Intrauterine route once for 1 dose    Contraception       lidocaine-prilocaine cream    EMLA    70 g    Apply topically every 6 hours as needed for moderate pain    S/P biopsy       omeprazole 40 MG capsule    priLOSEC    90 capsule    Take 1 capsule (40 mg) by mouth daily    Perforated ulcer (H)       pantoprazole 40 MG EC tablet    PROTONIX    30 tablet    Take 1 tablet (40 mg) by mouth daily    Epigastric abdominal pain       sucralfate 1 GM/10ML suspension    CARAFATE    400 mL    Take 10 mLs (1 g) by mouth 4 times daily for 10 days    Epigastric abdominal pain       Vitamin D 61410 UNIT Caps     12    3 times a week        * Notice:  This list has 2 medication(s) that are the same as other medications prescribed for you. Read the directions carefully, and ask your doctor or other care provider to review them with you.

## 2018-03-13 NOTE — NURSING NOTE
"Chief Complaint   Patient presents with     Gyn Exam     pap due       Initial /60  Ht 5' 7.25\" (1.708 m)  Wt 197 lb 9.6 oz (89.6 kg)  BMI 30.72 kg/m2 Estimated body mass index is 30.72 kg/(m^2) as calculated from the following:    Height as of this encounter: 5' 7.25\" (1.708 m).    Weight as of this encounter: 197 lb 9.6 oz (89.6 kg).  Medication Reconciliation: complete   Kelle Alvarado CMA      "

## 2018-03-15 LAB
COPATH REPORT: NORMAL
PAP: NORMAL

## 2018-03-19 LAB
FINAL DIAGNOSIS: NORMAL
HPV HR 12 DNA CVX QL NAA+PROBE: NEGATIVE
HPV16 DNA SPEC QL NAA+PROBE: NEGATIVE
HPV18 DNA SPEC QL NAA+PROBE: NEGATIVE
SPECIMEN DESCRIPTION: NORMAL
SPECIMEN SOURCE CVX/VAG CYTO: NORMAL

## 2018-03-19 NOTE — TELEPHONE ENCOUNTER
PA Initiation    Medication: CARAFATE 1G/10ml   Insurance Company: [employer] - Phone [phone] Fax [phone]  Pharmacy Filling the Rx: [facility] [address] [place] [address] - [address] [place] [address] [address] [facility]  Filling Pharmacy Phone: [phone]  Filling Pharmacy Fax:    Start Date: 3/19/2018

## 2018-03-20 PROBLEM — Z91.89 HISTORY OF DES EXPOSURE IN UTERO: Status: ACTIVE | Noted: 2018-03-20

## 2018-03-26 NOTE — TELEPHONE ENCOUNTER
PRIOR AUTHORIZATION DENIED    Medication: CARAFATE 1G/10ml - DENIED    Denial Date: 3/23/2018    Denial Rational:     Appeal Information:

## 2018-03-29 ENCOUNTER — TELEPHONE (OUTPATIENT)
Dept: SURGERY | Facility: CLINIC | Age: 53
End: 2018-03-29

## 2018-03-29 NOTE — TELEPHONE ENCOUNTER
Patient had gastric bypass 2010 as well as a perforation in 2010.  She was seen 3/12/18 for suspected anastomotic ulcer.    She is having Dexa scan tomorrow and was told to stop all vitamins, calcium, and antiacids.    She stopped them last night including her prescription Protonix and reports stomach is burning today.  She is wondering if she can take an OTC antiacid.  Requesting return call.    Called [facility].  Patient's to avoid supplements with Calcium and Magnesium.   Okay for prescription antiacids without the above such as Protonix.    Called patient to inform her to okay to take Protonix but avoid OTC antiacids.  Patient verbalized understanding and is agreeable to plan.  Sayda Mjeia, MS, RD, RN

## 2018-03-30 ENCOUNTER — HOSPITAL ENCOUNTER (OUTPATIENT)
Dept: MAMMOGRAPHY | Facility: CLINIC | Age: 53
Discharge: HOME | End: 2018-03-30
Attending: FAMILY MEDICINE | Admitting: FAMILY MEDICINE
Payer: COMMERCIAL

## 2018-03-30 ENCOUNTER — TELEPHONE (OUTPATIENT)
Dept: OBGYN | Facility: CLINIC | Age: 53
End: 2018-03-30

## 2018-03-30 ENCOUNTER — RADIANT APPOINTMENT (OUTPATIENT)
Dept: BONE DENSITY | Facility: CLINIC | Age: 53
End: 2018-03-30
Attending: PHYSICIAN ASSISTANT
Payer: COMMERCIAL

## 2018-03-30 DIAGNOSIS — Z98.84 BARIATRIC SURGERY STATUS: ICD-10-CM

## 2018-03-30 DIAGNOSIS — K91.2 MALNUTRITION FOLLOWING GASTROINTESTINAL SURGERY: ICD-10-CM

## 2018-03-30 DIAGNOSIS — Z00.00 ROUTINE GENERAL MEDICAL EXAMINATION AT A HEALTH CARE FACILITY: ICD-10-CM

## 2018-03-30 PROCEDURE — 77080 DXA BONE DENSITY AXIAL: CPT | Performed by: INTERNAL MEDICINE

## 2018-03-30 PROCEDURE — 77067 SCR MAMMO BI INCL CAD: CPT

## 2018-03-30 NOTE — TELEPHONE ENCOUNTER
"Pap result notes copied and pasted into tele encounter [2nd request for provider review]:     \"Dr. Rosado,    Please review and advise on follow up.  53 year old with current NIL pap, neg HR HPV.  No abnormal pap hx noted upon review of chart.   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    What follow up do you recommend?\"       Thanks!  SYL Anthony, RN - Pap Tracking    "

## 2018-04-03 NOTE — TELEPHONE ENCOUNTER
Addended note in patient's chart to reflect patient has not had exposure to JOSUE.      Gerri Linares RN

## 2018-04-09 ENCOUNTER — TELEPHONE (OUTPATIENT)
Dept: SURGERY | Facility: CLINIC | Age: 53
End: 2018-04-09

## 2018-04-09 DIAGNOSIS — K91.2 MALNUTRITION FOLLOWING GASTROINTESTINAL SURGERY: ICD-10-CM

## 2018-04-09 DIAGNOSIS — E61.1 IRON DEFICIENCY: Primary | ICD-10-CM

## 2018-04-09 NOTE — TELEPHONE ENCOUNTER
"Pt canceled appt for this Thursday.  Her abdominal pain from her ulcer has improved.  Her mother  and she was out last week so she cannot take off anymore time off.      She has a question about her iron.  Ferritin was 6.  Started iron supplement.  Has been taking stool softeners with her Vitron C.   Over weekend was bound up.  Almost ended in UC.  Had to go to \" extraordinary measures\" to get moving. Needs advice on what to do or an alternative plan.    Called pt back.  Started taking Vitron C since our last appt along with stool softener qhs. This weekend she was really constipated.   Was also eating a lot of greek yogurt daily.  She states she had to take two dulcolax and then a third an hour later before going over the weekend. When she went it was green in color.     She says she stomach feels great.  She still has some Protonix left because she didn't take them everyday as prescribed.  She also has some of the Carafate left.   Takes a swig of Carafate once in a while if needed.  Is not taking anything on a regular basis.        Plan:   Back off on Vitron C to every other day.  Start Miralax 1 capful with drink daily.  Can titrate to effect.   When BM are regular well can increase iron to everyday.    Have repeat iron labs in 2018.    Recommend she take ranitidine 150 mg twice daily from now on to decrease change of recurrence of ulcers.     Return to clinic for annual visit and sooner if abdominal pain returns or constipation worsens.   "

## 2018-06-25 ENCOUNTER — HOSPITAL ENCOUNTER (OUTPATIENT)
Dept: LAB | Facility: CLINIC | Age: 53
Discharge: HOME | End: 2018-06-25
Attending: FAMILY MEDICINE | Admitting: FAMILY MEDICINE
Payer: COMMERCIAL

## 2018-06-25 DIAGNOSIS — K91.2 MALNUTRITION FOLLOWING GASTROINTESTINAL SURGERY: ICD-10-CM

## 2018-06-25 DIAGNOSIS — E61.1 IRON DEFICIENCY: ICD-10-CM

## 2018-06-25 DIAGNOSIS — Z00.00 ROUTINE GENERAL MEDICAL EXAMINATION AT A HEALTH CARE FACILITY: ICD-10-CM

## 2018-06-25 LAB
ALBUMIN SERPL-MCNC: 3.7 G/DL (ref 3.4–5)
ALP SERPL-CCNC: 99 U/L (ref 40–150)
ALT SERPL W P-5'-P-CCNC: 21 U/L (ref 0–50)
ANION GAP SERPL CALCULATED.3IONS-SCNC: 2 MMOL/L (ref 3–14)
AST SERPL W P-5'-P-CCNC: 17 U/L (ref 0–45)
BILIRUB SERPL-MCNC: 0.3 MG/DL (ref 0.2–1.3)
BUN SERPL-MCNC: 12 MG/DL (ref 7–30)
CALCIUM SERPL-MCNC: 8.6 MG/DL (ref 8.5–10.1)
CHLORIDE SERPL-SCNC: 107 MMOL/L (ref 94–109)
CHOLEST SERPL-MCNC: 152 MG/DL
CO2 SERPL-SCNC: 32 MMOL/L (ref 20–32)
CREAT SERPL-MCNC: 0.82 MG/DL (ref 0.52–1.04)
FERRITIN SERPL-MCNC: 10 NG/ML (ref 8–252)
GFR SERPL CREATININE-BSD FRML MDRD: 73 ML/MIN/1.7M2
GLUCOSE SERPL-MCNC: 88 MG/DL (ref 70–99)
HDLC SERPL-MCNC: 59 MG/DL
IRON SATN MFR SERPL: 11 % (ref 15–46)
IRON SERPL-MCNC: 38 UG/DL (ref 35–180)
LDLC SERPL CALC-MCNC: 76 MG/DL
NONHDLC SERPL-MCNC: 93 MG/DL
POTASSIUM SERPL-SCNC: 4.4 MMOL/L (ref 3.4–5.3)
PROT SERPL-MCNC: 7.3 G/DL (ref 6.8–8.8)
SODIUM SERPL-SCNC: 141 MMOL/L (ref 133–144)
TIBC SERPL-MCNC: 338 UG/DL (ref 240–430)
TRIGL SERPL-MCNC: 83 MG/DL
TSH SERPL DL<=0.005 MIU/L-ACNC: 1.48 MU/L (ref 0.4–4)

## 2018-06-25 PROCEDURE — 80061 LIPID PANEL: CPT | Performed by: FAMILY MEDICINE

## 2018-06-25 PROCEDURE — 80053 COMPREHEN METABOLIC PANEL: CPT | Performed by: FAMILY MEDICINE

## 2018-06-25 PROCEDURE — 36415 COLL VENOUS BLD VENIPUNCTURE: CPT | Performed by: FAMILY MEDICINE

## 2018-06-25 PROCEDURE — 83550 IRON BINDING TEST: CPT | Performed by: FAMILY MEDICINE

## 2018-06-25 PROCEDURE — 83540 ASSAY OF IRON: CPT | Performed by: FAMILY MEDICINE

## 2018-06-25 PROCEDURE — 84443 ASSAY THYROID STIM HORMONE: CPT | Performed by: FAMILY MEDICINE

## 2018-06-25 PROCEDURE — 82728 ASSAY OF FERRITIN: CPT | Performed by: FAMILY MEDICINE

## 2018-07-06 ENCOUNTER — MYC MEDICAL ADVICE (OUTPATIENT)
Dept: SURGERY | Facility: CLINIC | Age: 53
End: 2018-07-06

## 2018-07-06 DIAGNOSIS — E61.1 IRON DEFICIENCY: Primary | ICD-10-CM

## 2018-07-06 DIAGNOSIS — K91.2 MALNUTRITION FOLLOWING GASTROINTESTINAL SURGERY: ICD-10-CM

## 2018-07-06 NOTE — TELEPHONE ENCOUNTER
Pt called to say she has been feeling a little foggy headed and was wondering if this could be due to iron deficiency as well.  Recently went to psychiatrist and had genetic testing and was found she does not absorb folate wonders if this is might be the case with iron.  Would like to have the hematology referral I mentioned in her lab results review I sent last night.    Ordered referral today.

## 2018-07-17 ENCOUNTER — TELEPHONE (OUTPATIENT)
Dept: ONCOLOGY | Facility: CLINIC | Age: 53
End: 2018-07-17

## 2018-07-17 NOTE — TELEPHONE ENCOUNTER
I called and spoke with patient and introduced myself and my role as care coordinator. I reviewed briefly our location and and appointment details. Records are in [facility]. Patient had a few general questions that were answered.    Will follow as needed with care coordination. Sandy Tracy

## 2018-07-18 ENCOUNTER — ONCOLOGY VISIT (OUTPATIENT)
Dept: ONCOLOGY | Facility: CLINIC | Age: 53
End: 2018-07-18
Attending: INTERNAL MEDICINE
Payer: COMMERCIAL

## 2018-07-18 ENCOUNTER — HOSPITAL ENCOUNTER (OUTPATIENT)
Facility: CLINIC | Age: 53
Setting detail: SPECIMEN
Discharge: HOME | End: 2018-07-18
Attending: INTERNAL MEDICINE | Admitting: INTERNAL MEDICINE
Payer: COMMERCIAL

## 2018-07-18 VITALS
SYSTOLIC BLOOD PRESSURE: 105 MMHG | RESPIRATION RATE: 16 BRPM | DIASTOLIC BLOOD PRESSURE: 71 MMHG | HEART RATE: 62 BPM | WEIGHT: 190.6 LBS | BODY MASS INDEX: 29.63 KG/M2 | OXYGEN SATURATION: 96 % | TEMPERATURE: 98.2 F

## 2018-07-18 DIAGNOSIS — E61.1 IRON DEFICIENCY: Primary | ICD-10-CM

## 2018-07-18 DIAGNOSIS — D69.6 THROMBOCYTOPENIA (H): ICD-10-CM

## 2018-07-18 DIAGNOSIS — T50.905A ADVERSE EFFECT OF DRUG, INITIAL ENCOUNTER: ICD-10-CM

## 2018-07-18 DIAGNOSIS — K90.9 INTESTINAL MALABSORPTION, UNSPECIFIED TYPE: ICD-10-CM

## 2018-07-18 DIAGNOSIS — Z98.84 S/P GASTRIC BYPASS: ICD-10-CM

## 2018-07-18 LAB
BASOPHILS # BLD AUTO: 0 10E9/L (ref 0–0.2)
BASOPHILS NFR BLD AUTO: 0.3 %
DIFFERENTIAL METHOD BLD: ABNORMAL
EOSINOPHIL # BLD AUTO: 0.1 10E9/L (ref 0–0.7)
EOSINOPHIL NFR BLD AUTO: 3.2 %
ERYTHROCYTE [DISTWIDTH] IN BLOOD BY AUTOMATED COUNT: 13.8 % (ref 10–15)
FOLATE SERPL-MCNC: 17.2 NG/ML
HCT VFR BLD AUTO: 38.8 % (ref 35–47)
HGB BLD-MCNC: 12.9 G/DL (ref 11.7–15.7)
IMM GRANULOCYTES # BLD: 0 10E9/L (ref 0–0.4)
IMM GRANULOCYTES NFR BLD: 0.3 %
LYMPHOCYTES # BLD AUTO: 1.3 10E9/L (ref 0.8–5.3)
LYMPHOCYTES NFR BLD AUTO: 34.3 %
MCH RBC QN AUTO: 27.9 PG (ref 26.5–33)
MCHC RBC AUTO-ENTMCNC: 33.2 G/DL (ref 31.5–36.5)
MCV RBC AUTO: 84 FL (ref 78–100)
MONOCYTES # BLD AUTO: 0.5 10E9/L (ref 0–1.3)
MONOCYTES NFR BLD AUTO: 12.1 %
NEUTROPHILS # BLD AUTO: 1.9 10E9/L (ref 1.6–8.3)
NEUTROPHILS NFR BLD AUTO: 49.8 %
NRBC # BLD AUTO: 0 10*3/UL
NRBC BLD AUTO-RTO: 0 /100
PLATELET # BLD AUTO: 117 10E9/L (ref 150–450)
RBC # BLD AUTO: 4.63 10E12/L (ref 3.8–5.2)
RETICS # AUTO: 66.2 10E9/L (ref 25–95)
RETICS/RBC NFR AUTO: 1.4 % (ref 0.5–2)
WBC # BLD AUTO: 3.8 10E9/L (ref 4–11)

## 2018-07-18 PROCEDURE — 00000402 ZZHCL STATISTIC TOTAL PROTEIN: Performed by: INTERNAL MEDICINE

## 2018-07-18 PROCEDURE — 85060 BLOOD SMEAR INTERPRETATION: CPT | Performed by: INTERNAL MEDICINE

## 2018-07-18 PROCEDURE — 85025 COMPLETE CBC W/AUTO DIFF WBC: CPT | Performed by: INTERNAL MEDICINE

## 2018-07-18 PROCEDURE — 36415 COLL VENOUS BLD VENIPUNCTURE: CPT

## 2018-07-18 PROCEDURE — 40000847 ZZHCL STATISTIC MORPHOLOGY W/INTERP HISTOLOGY TC 85060: Performed by: INTERNAL MEDICINE

## 2018-07-18 PROCEDURE — 82746 ASSAY OF FOLIC ACID SERUM: CPT | Performed by: INTERNAL MEDICINE

## 2018-07-18 PROCEDURE — G0463 HOSPITAL OUTPT CLINIC VISIT: HCPCS

## 2018-07-18 PROCEDURE — 85045 AUTOMATED RETICULOCYTE COUNT: CPT | Performed by: INTERNAL MEDICINE

## 2018-07-18 PROCEDURE — 84165 PROTEIN E-PHORESIS SERUM: CPT | Performed by: INTERNAL MEDICINE

## 2018-07-18 PROCEDURE — 99203 OFFICE O/P NEW LOW 30 MIN: CPT | Performed by: INTERNAL MEDICINE

## 2018-07-18 ASSESSMENT — PAIN SCALES - GENERAL: PAINLEVEL: MILD PAIN (2)

## 2018-07-18 NOTE — PROGRESS NOTES
Visit Date:   07/18/2018      This consult has been requested by Dominique Rao PA-C for iron deficiency.      Ms. Oliver is a 53-year-old female who had gastric bypass surgery in February, 2010. Patient has her labs monitored regularly.  Patient has been found to be iron deficient.  Her labs are reviewed and summarized below.   1.  Multiple labs were done on 03/12/2018.   -WBC of 4.2, hemoglobin 11.9 and platelet of 130.  MCV of 80.   -Ferritin of 6.  Iron of 66.   -Vitamin B12 of 512.   2. On 06/25/2018.   -Iron of 38 and ferritin of 10.  Saturation of 11%.   -Normal TSH.   -CMP is normal.      I reviewed her previous labs.  Patient has chronic thrombocytopenia.  On 08/26/2010, platelet was low at 138 with normal WBC and hemoglobin.  Since then, multiple CBCs have revealed thrombocytopenia with platelet around 125.      Patient had GI workup done.  EGD on 08/17/2010 revealed changes of surgery.  No other pathology.  Colonoscopy on 01/20/2017 is normal.      REVIEW OF SYSTEMS:    Patient lately has not been feeling well.  She has been feeling more weak and tired.  She gets dizziness and occasional headache.  She has been having muscle cramps mainly in the lower extremity.  She has been sleeping more.  These are new symptoms for her in last few months.      No visual problem.  No chest pain.  No shortness of breath.  No abdominal pain, nausea or vomiting.  Appetite has been fairly good.  No fever or chills.  Denies any blood in the urine or stool.  No menstrual bleeding.  She has an IUD.      Patient has tried oral iron.  It caused severe constipation and abdominal pain.  She has not been able to tolerate it.  She can only take it intermittently.      All other review of systems negative.      ALLERGIES:  Reviewed.      MEDICATIONS:  Reviewed.      PAST MEDICAL HISTORY:   1.  Gastric bypass surgery in 02/2010.   2.  Intermittent asthma.   3.  Anxiety.   4.  Neuropathy.   5.  Neck fusion for spontaneous subluxation.    6.  Cholecystectomy.   7.  Hyperlipidemia.      SOCIAL HISTORY:   -No history of smoking.     -Occasional alcohol use.      FAMILY HISTORY:  No history of blood disorder or any cancer in the family.      PHYSICAL EXAMINATION:   GENERAL:  She was alert and oriented x3.   VITAL SIGNS:  Reviewed.   Rest of the systems not examine.      LABORATORY DATA:  Reviewed.      ASSESSMENT:   1.  A 53-year-old female with iron deficiency.  This is secondary to malabsorption of iron due to gastric bypass surgery.   2.  Multiple symptoms from iron deficiency including fatigue, muscle cramps, dizziness and increased sleep.   3.  Status post gastric bypass surgery in 2010.   4.  Chronic thrombocytopenia.   5.  Intolerant to oral iron.  6.  Other medical problems including anxiety and neuropathy.      RECOMMENDATIONS:   1.  I discussed with her regarding iron deficiency.  Pathophysiology of iron deficiency discussed.  I explained to her that she has poor absorption of iron because of gastric bypass surgery.  Because of that she has iron deficiency.  Iron deficiency is causing symptoms including fatigue, muscle cramps, dizziness and increased sleeping.   2.  Discussed with her regarding treatment of iron deficiency.  She has tried oral iron.  It causes severe constipation and abdominal pain.  She does not want to take it.  As patient is symptomatic, discussed with her regarding IV iron.  Patient agreeable for it.  We will plan on giving IV injectafer 2 doses.  Side effects including anaphylactic reactions were all discussed.  I explained to the patient that because of gastric bypass surgery, she is at risk of becoming iron deficient in the future.  She will need to have iron studies along with hemoglobin rechecked every 6-12 months.   3.  Patient has chronic thrombocytopenia at least since 2010.  It has not gotten worse.  More than likely she has chronic ITP.  For workup, we will get some labs including SPEP, folate and blood smear  review.   4.  Patient had multiple questions, which were all answered.  I will plan on seeing her in the clinic 3-4 weeks after her iron infusion.  Hopefully, iron infusion helps with her symptoms.      Thanks for the consult.     ADDENDUM:  IV iron not approved by insurance company as she is not anemic. Will advise her to take multivitamin with iron and monitor CBC and iron studies. Labs done reveals normal folate and SPEP. Blood smear is normal except for mild thrombocytopenia.     Total face-to-face time spent 40 minutes, more than 50% of the time spent in counseling and coordination of care.         CALLUM PARK MD             D: 2018   T: 2018   MT: YOLANDE      Name:     ALEKSEY MORALEZ   MRN:      [id number]        Account:      [id number]   :      1965           Visit Date:   2018      Document: [id number]

## 2018-07-18 NOTE — MR AVS SNAPSHOT
After Visit Summary   7/18/2018    Shereen Oliver    MRN: [id number]           Patient Information     Date Of Birth          1965        Visit Information        Provider Department      7/18/2018 3:00 PM Kurt Del Cid MD [facility]        Today's Diagnoses     Iron deficiency    -  1    S/P gastric bypass        Adverse effect of drug, initial encounter        Intestinal malabsorption, unspecified type        Thrombocytopenia (H)          Care Instructions    Lab today.- Done LW  IV injectafer x 2 doses.  Scheduled at [place] per patient.  Liza  Follow up 4-6 weeks after IV iron with labs.  Scheduled at [place] per patient.  Liza       AVS printed and given to patient. Liza           Follow-ups after your visit        Your next 10 appointments already scheduled     Jul 20, 2018 10:30 AM CDT   Level 1 with RH INFUSION CHAIR 1   [facility])    [facility]  [address]  [facility] [address]   [phone]            Jul 27, 2018  3:00 PM CDT   Level 1 with RH INFUSION CHAIR 8   [facility])    [facility]  [address]  [facility] [address]   [phone]              Who to contact     If you have questions or need follow up information about today's clinic visit or your schedule please contact [facility] directly at [phone].  Normal or non-critical lab and imaging results will be communicated to you by MyChart, letter or phone within 4 business days after the clinic has received the results. If you do not hear from us within 7 days, please contact the clinic through MyChart or phone. If you have a critical or abnormal lab result, we will notify you by phone as soon as possible.  Submit refill requests through [employer] or call your pharmacy and they will forward  the refill request to us. Please allow 3 business days for your refill to be completed.          Additional Information About Your Visit        [employer] Information     [employer] gives you secure access to your electronic health record. If you see a primary care provider, you can also send messages to your care team and make appointments. If you have questions, please call your primary care clinic.  If you do not have a primary care provider, please call [phone] and they will assist you.        Care EveryWhere ID     This is your Care EveryWhere ID. This could be used by other organizations to access your [place] medical records  [id number]        Your Vitals Were     Pulse Temperature Respirations Pulse Oximetry BMI (Body Mass Index)       62 98.2  F (36.8  C) (Oral) 16 96% 29.63 kg/m2        Blood Pressure from Last 3 Encounters:   07/18/18 105/71   03/13/18 110/60   03/12/18 111/63    Weight from Last 3 Encounters:   07/18/18 86.5 kg (190 lb 9.6 oz)   03/13/18 89.6 kg (197 lb 9.6 oz)   03/12/18 90.3 kg (199 lb 2 oz)              We Performed the Following     Blood Morphology Pathologist Review     CBC with platelets differential     Folate     Protein electrophoresis     Reticulocyte count          Today's Medication Changes          These changes are accurate as of 7/18/18  3:48 PM.  If you have any questions, ask your nurse or doctor.               Stop taking these medicines if you haven't already. Please contact your care team if you have questions.     Vitamin D 55503 UNIT Caps                    Primary Care Provider Office Phone # Fax #    Jody Ribera [phone]       [address] [facility] [address]  [facility] [address]        Equal Access to Services     [facility]: Hadii anthony Luke, brandon suarez, qayari kasper. So [facility] [phone].    ATENCIÓN: Si habla español, tiene a barragan disposición servicios gratuitos  de asistencia lingüística. Skye blanco [phone].    We comply with applicable federal civil rights laws and [place] laws. We do not discriminate on the basis of race, color, national origin, age, disability, sex, sexual orientation, or gender identity.            Thank you!     Thank you for choosing [facility]  for your care. Our goal is always to provide you with excellent care. Hearing back from our patients is one way we can continue to improve our services. Please take a few minutes to complete the written survey that you may receive in the mail after your visit with us. Thank you!             Your Updated Medication List - Protect others around you: Learn how to safely use, store and throw away your medicines at www.disposemymeds.org.          This list is accurate as of 7/18/18  3:48 PM.  Always use your most recent med list.                   Brand Name Dispense Instructions for use Diagnosis    acyclovir 400 MG tablet    ZOVIRAX    21 tablet    Take 1 tablet (400 mg) by mouth 3 times daily    Rash       albuterol 108 (90 Base) MCG/ACT Inhaler    PROAIR HFA/PROVENTIL HFA/VENTOLIN HFA    1 Inhaler    Inhale 2 puffs into the lungs every 4 hours as needed for shortness of breath / dyspnea    Intermittent asthma       * fluticasone 50 MCG/ACT spray    FLONASE    1 Bottle    Spray 2 sprays into both nostrils daily    Acute sinusitis with symptoms > 10 days       * fluticasone 50 MCG/ACT spray    FLONASE    1 Bottle    Spray 1-2 sprays into both nostrils daily    Acute sinusitis with coexisting condition requiring prophylactic treatment       gabapentin 100 MG capsule    NEURONTIN    60 capsule    1-2 per day as needed for pain    Neuropathy       ibuprofen 800 MG tablet    ADVIL/MOTRIN    90 tablet    Take 1 tablet (800 mg) by mouth every 8 hours as needed for moderate pain    S/P biopsy       levonorgestrel 20 MCG/24HR IUD    MIRENA    1 each    1 each (20 mcg) by Intrauterine route once for 1  dose    Contraception       lidocaine-prilocaine cream    EMLA    70 g    Apply topically every 6 hours as needed for moderate pain    S/P biopsy       omeprazole 40 MG capsule    priLOSEC    90 capsule    Take 1 capsule (40 mg) by mouth daily    Perforated ulcer (H)       pantoprazole 40 MG EC tablet    PROTONIX    30 tablet    Take 1 tablet (40 mg) by mouth daily    Epigastric abdominal pain       VITAMIN D (CHOLECALCIFEROL) PO      Take 2,000 Units by mouth daily        * Notice:  This list has 2 medication(s) that are the same as other medications prescribed for you. Read the directions carefully, and ask your doctor or other care provider to review them with you.

## 2018-07-18 NOTE — Clinical Note
"    7/18/2018         RE: Shereen Oliver  [address]  [place] [address]        Dear Colleague,    Thank you for referring your patient, Shereen Oliver, to the [facility]. Please see a copy of my visit note below.    Oncology Rooming Note    July 18, 2018 2:52 PM   Shereen Oliver is a 53 year old female who presents for:    Chief Complaint   Patient presents with     Oncology Clinic Visit     Iron deficiency anemia      Initial Vitals: /71 (BP Location: Left arm, Patient Position: Sitting, Cuff Size: Adult Large)  Pulse 62  Temp 98.2  F (36.8  C) (Oral)  Resp 16  Wt 86.5 kg (190 lb 9.6 oz)  SpO2 96%  BMI 29.63 kg/m2 Estimated body mass index is 29.63 kg/(m^2) as calculated from the following:    Height as of 3/13/18: 1.708 m (5' 7.25\").    Weight as of this encounter: 86.5 kg (190 lb 9.6 oz). Body surface area is 2.03 meters squared.  Mild Pain (2) Comment: Data Unavailable   No LMP recorded. Patient is not currently having periods (Reason: IUD).  Allergies reviewed: Yes  Medications reviewed: Yes    Medications: Medication refills not needed today.  Pharmacy name entered into [facility]: [facility] [address] [place] [address] - [address] [place] [address] [address] AT [facility] [address]    Clinical concerns: None                    4 minutes for nursing intake (face to face time)     Rosetta Mack MA    Medical Assistant Note:  Shereen Oliver presents today for lab visit.    Patient seen by provider today: Yes: Dr. Del Cid.   present during visit today: Not Applicable.    Concerns: No Concerns.    Procedure:  Lab draw site: LAC, Needle type: BF, Gauge: 21 g gauze and coban applied.    Post Assessment:  Labs drawn without difficulty: Yes.    Discharge Plan:  Departure Mode: Ambulatory.    Face to Face Time: 4.    Rosetta Mack MA            Visit Date:   07/18/2018      This consult has been requested by Dominique Rao PA-C for iron deficiency.      HISTORY OF " PRESENT ILLNESS:  Ms. Oliver is a 53-year-old female who had gastric bypass surgery in 2008.  The patient has her labs monitored regularly.  The patient has been found to be iron deficient.  Her labs are reviewed and summarized below.   1.  Multiple labs were done on 03/01/2018.   -- WBC of 4.2, hemoglobin 11.9 and platelet of 130.  MCV of 80.   -- Ferritin of 6.  Iron of 66.   -- Vitamin B12 of 512.   2.  On 03/13/2018, TSH and CMP are normal.   3.  Multiple labs were done on 06/25/2018.   -- Iron of 38 and ferritin of 10.  Saturation of 11%.   -- Normal TSH.   -- CMP is normal.      I reviewed her previous labs.  The patient has chronic thrombocytopenia.  On 08/26/2010, platelet was low at 138 *** with normal WBC and hemoglobin.  Since then, multiple CBCs have revealed thrombocytopenia with platelet around 125.      The patient had GI workup done.  EGD on 08/17/2010 revealed changes of surgery.  No other pathology.  Colonoscopy on 01/20/2017 is normal.      REVIEW OF SYSTEMS:  The patient lately has not been feeling well.  She has been feeling more weak and tired.  She gets some dizziness, occasional headache.  She has been having muscle cramps mainly in the lower extremity.  She has been sleeping more.  These are new changes for her.  This is happening last few months.      No visual problem.  No chest pain.  No shortness of breath.  No abdominal pain, nausea or vomiting.  Appetite has been fairly good.  No fever or chills.  Denies any blood in the urine or stool.  No menstrual bleeding.  She has an IUD.      The patient has tried oral iron.  It caused severe constipation and abdominal pain.  She has not been able to tolerate it.  She can only take it intermittently.      All other review of systems negative.      ALLERGIES:  Reviewed.      MEDICATIONS:  Reviewed.      PAST MEDICAL HISTORY:   1.  Gastric bypass surgery in 2008.   2.  Intermittent asthma.   3.  Anxiety.   4.  Neuropathy.   5.  Neck fusion for  spontaneous subluxation.   6.  Cholecystectomy.   7.  Hyperlipidemia.      SOCIAL HISTORY:   -- No history of smoking.     -- Occasional alcohol use.      FAMILY HISTORY:  No history of blood disorder or any cancer in the family.      PHYSICAL EXAMINATION:   GENERAL:  She was alert, oriented x3.   VITAL SIGNS:  Reviewed.   The rest of the systems not examine.      LABORATORY DATA:  Reviewed.      ASSESSMENT:   1.  A 53-year-old female with iron deficiency.  This is secondary to malabsorption of iron due to gastric bypass surgery.   2.  Multiple symptoms from iron deficiency including fatigue, muscle cramps, dizziness and increased sleep.   3.  Status post gastric bypass surgery in 2008.   4.  Other medical problems including anxiety, neuropathy.      RECOMMENDATIONS:   1.  I discussed with her regarding iron deficiency.  Pathophysiology of iron deficiency discussed.  I explained to her that she has poor absorption of iron because of gastric bypass surgery.  Because of that she has iron deficiency.  Iron deficiency is causing symptoms including fatigued muscles, muscle cramps, dizziness and increased sleeping.   2.  Discussed with her regarding treatment of iron deficiency.  She has tried oral iron.  It causes severe constipation and abdominal pain.  She does not want to take it.  As patient is symptomatic, discussed with her regarding IV iron.  The patient agreeable for it.  We will plan on giving IV injectafer 2 doses.  Side effects including anaphylactic reactions were all discussed.  I explained to the patient that because of gastric bypass surgery, she is at risk of becoming iron deficient in the future.  She will need to have iron studies along with hemoglobin rechecked every 6-12 months.   3.  The patient has chronic thrombocytopenia at least since 2010.  It has not gotten worse.  More than likely she has chronic ITP.  For workup, we will get some labs including SPEP, folate and blood smear review.   4.  The  patient had multiple questions, which were all answered.  I will plan on seeing her in the clinic 3-4 weeks after her iron infusion.  Hopefully, iron infusion helps with her symptoms.      Thanks for the consult.      Total face-to-face time spent 40 minutes, more than 50% of the time spent in counseling and coordination of care.         CALLUM PARK MD             D: 2018   T: 2018   MT: YOLANDE      Name:     ALEKSEY MORALEZ   MRN:      [id number]        Account:      [id number]   :      1965           Visit Date:   2018      Document: [id number]       Again, thank you for allowing me to participate in the care of your patient.        Sincerely,        Callum Park MD

## 2018-07-18 NOTE — PROGRESS NOTES
"Oncology Rooming Note    July 18, 2018 2:52 PM   Shereen Oliver is a 53 year old female who presents for:    Chief Complaint   Patient presents with     Oncology Clinic Visit     Iron deficiency anemia      Initial Vitals: /71 (BP Location: Left arm, Patient Position: Sitting, Cuff Size: Adult Large)  Pulse 62  Temp 98.2  F (36.8  C) (Oral)  Resp 16  Wt 86.5 kg (190 lb 9.6 oz)  SpO2 96%  BMI 29.63 kg/m2 Estimated body mass index is 29.63 kg/(m^2) as calculated from the following:    Height as of 3/13/18: 1.708 m (5' 7.25\").    Weight as of this encounter: 86.5 kg (190 lb 9.6 oz). Body surface area is 2.03 meters squared.  Mild Pain (2) Comment: Data Unavailable   No LMP recorded. Patient is not currently having periods (Reason: IUD).  Allergies reviewed: Yes  Medications reviewed: Yes    Medications: Medication refills not needed today.  Pharmacy [facility] entered into [employer]: [facility] [address] [place] [address] - [address] [place] [address] AT [facility]    Clinical concerns: None                    4 minutes for nursing intake (face to face time)     Rosetta Mack MA    Medical Assistant Note:  Shereen Oliver presents today for lab visit.    Patient seen by provider today: Yes: Dr. Del Cid.   present during visit today: Not Applicable.    Concerns: No Concerns.    Procedure:  Lab draw site: LAC, Needle type: BF, Gauge: 21 g gauze and coban applied.    Post Assessment:  Labs drawn without difficulty: Yes.    Discharge Plan:  Departure Mode: Ambulatory.    Face to Face Time: 4.    Rosetta Mack MA          "

## 2018-07-18 NOTE — PATIENT INSTRUCTIONS
Lab today.- Done LW  IV injectafer x 2 doses.  Scheduled at [place] per patient.  Liza  Follow up 4-6 weeks after IV iron with labs.  Scheduled at [place] per patient.  Liza LEE printed and given to patient. Liza

## 2018-07-19 ENCOUNTER — HOSPITAL ENCOUNTER (EMERGENCY)
Facility: CLINIC | Age: 53
Discharge: HOME | End: 2018-07-19
Attending: EMERGENCY MEDICINE | Admitting: EMERGENCY MEDICINE
Payer: COMMERCIAL

## 2018-07-19 ENCOUNTER — APPOINTMENT (OUTPATIENT)
Dept: CT IMAGING | Facility: CLINIC | Age: 53
End: 2018-07-19
Attending: EMERGENCY MEDICINE
Payer: COMMERCIAL

## 2018-07-19 ENCOUNTER — TELEPHONE (OUTPATIENT)
Dept: INTERNAL MEDICINE | Facility: CLINIC | Age: 53
End: 2018-07-19

## 2018-07-19 ENCOUNTER — TELEPHONE (OUTPATIENT)
Dept: SURGERY | Facility: CLINIC | Age: 53
End: 2018-07-19

## 2018-07-19 VITALS
SYSTOLIC BLOOD PRESSURE: 140 MMHG | OXYGEN SATURATION: 100 % | TEMPERATURE: 97.9 F | HEIGHT: 68 IN | DIASTOLIC BLOOD PRESSURE: 71 MMHG | BODY MASS INDEX: 28.79 KG/M2 | WEIGHT: 190 LBS | RESPIRATION RATE: 16 BRPM

## 2018-07-19 DIAGNOSIS — R19.7 DIARRHEA, UNSPECIFIED TYPE: ICD-10-CM

## 2018-07-19 DIAGNOSIS — R10.9 ABDOMINAL CRAMPING: ICD-10-CM

## 2018-07-19 LAB
ALBUMIN SERPL ELPH-MCNC: 3.8 G/DL (ref 3.7–5.1)
ALBUMIN SERPL-MCNC: 3.5 G/DL (ref 3.4–5)
ALBUMIN UR-MCNC: NEGATIVE MG/DL
ALP SERPL-CCNC: 90 U/L (ref 40–150)
ALPHA1 GLOB SERPL ELPH-MCNC: 0.3 G/DL (ref 0.2–0.4)
ALPHA2 GLOB SERPL ELPH-MCNC: 0.8 G/DL (ref 0.5–0.9)
ALT SERPL W P-5'-P-CCNC: 20 U/L (ref 0–50)
ANION GAP SERPL CALCULATED.3IONS-SCNC: 6 MMOL/L (ref 3–14)
APPEARANCE UR: CLEAR
AST SERPL W P-5'-P-CCNC: 18 U/L (ref 0–45)
B-GLOBULIN SERPL ELPH-MCNC: 0.8 G/DL (ref 0.6–1)
BASOPHILS # BLD AUTO: 0 10E9/L (ref 0–0.2)
BASOPHILS NFR BLD AUTO: 0.4 %
BILIRUB SERPL-MCNC: 0.6 MG/DL (ref 0.2–1.3)
BILIRUB UR QL STRIP: NEGATIVE
BUN SERPL-MCNC: 10 MG/DL (ref 7–30)
CALCIUM SERPL-MCNC: 8.7 MG/DL (ref 8.5–10.1)
CHLORIDE SERPL-SCNC: 107 MMOL/L (ref 94–109)
CO2 SERPL-SCNC: 28 MMOL/L (ref 20–32)
COLOR UR AUTO: ABNORMAL
COPATH REPORT: NORMAL
CREAT SERPL-MCNC: 0.84 MG/DL (ref 0.52–1.04)
DIFFERENTIAL METHOD BLD: ABNORMAL
EOSINOPHIL # BLD AUTO: 0.1 10E9/L (ref 0–0.7)
EOSINOPHIL NFR BLD AUTO: 2.3 %
ERYTHROCYTE [DISTWIDTH] IN BLOOD BY AUTOMATED COUNT: 13.5 % (ref 10–15)
GAMMA GLOB SERPL ELPH-MCNC: 0.9 G/DL (ref 0.7–1.6)
GFR SERPL CREATININE-BSD FRML MDRD: 70 ML/MIN/1.7M2
GLUCOSE SERPL-MCNC: 88 MG/DL (ref 70–99)
GLUCOSE UR STRIP-MCNC: NEGATIVE MG/DL
HCT VFR BLD AUTO: 41.4 % (ref 35–47)
HGB BLD-MCNC: 13.5 G/DL (ref 11.7–15.7)
HGB UR QL STRIP: NEGATIVE
IMM GRANULOCYTES # BLD: 0 10E9/L (ref 0–0.4)
IMM GRANULOCYTES NFR BLD: 0.2 %
INTERPRETATION ECG - MUSE: NORMAL
KETONES UR STRIP-MCNC: NEGATIVE MG/DL
LEUKOCYTE ESTERASE UR QL STRIP: NEGATIVE
LIPASE SERPL-CCNC: 154 U/L (ref 73–393)
LYMPHOCYTES # BLD AUTO: 0.8 10E9/L (ref 0.8–5.3)
LYMPHOCYTES NFR BLD AUTO: 16.6 %
M PROTEIN SERPL ELPH-MCNC: 0 G/DL
MAGNESIUM SERPL-MCNC: 2.1 MG/DL (ref 1.6–2.3)
MCH RBC QN AUTO: 27.9 PG (ref 26.5–33)
MCHC RBC AUTO-ENTMCNC: 32.6 G/DL (ref 31.5–36.5)
MCV RBC AUTO: 86 FL (ref 78–100)
MONOCYTES # BLD AUTO: 0.5 10E9/L (ref 0–1.3)
MONOCYTES NFR BLD AUTO: 9.8 %
NEUTROPHILS # BLD AUTO: 3.3 10E9/L (ref 1.6–8.3)
NEUTROPHILS NFR BLD AUTO: 70.7 %
NITRATE UR QL: NEGATIVE
NRBC # BLD AUTO: 0 10*3/UL
NRBC BLD AUTO-RTO: 0 /100
PH UR STRIP: 7 PH (ref 5–7)
PLATELET # BLD AUTO: 120 10E9/L (ref 150–450)
POTASSIUM SERPL-SCNC: 3.8 MMOL/L (ref 3.4–5.3)
PROT PATTERN SERPL ELPH-IMP: NORMAL
PROT SERPL-MCNC: 6.9 G/DL (ref 6.8–8.8)
RBC # BLD AUTO: 4.84 10E12/L (ref 3.8–5.2)
RBC #/AREA URNS AUTO: 0 /HPF (ref 0–2)
SODIUM SERPL-SCNC: 141 MMOL/L (ref 133–144)
SOURCE: ABNORMAL
SP GR UR STRIP: 1.02 (ref 1–1.03)
SQUAMOUS #/AREA URNS AUTO: 2 /HPF (ref 0–1)
UROBILINOGEN UR STRIP-MCNC: 0 MG/DL (ref 0–2)
WBC # BLD AUTO: 4.7 10E9/L (ref 4–11)
WBC #/AREA URNS AUTO: 1 /HPF (ref 0–5)

## 2018-07-19 PROCEDURE — 25000128 H RX IP 250 OP 636: Performed by: EMERGENCY MEDICINE

## 2018-07-19 PROCEDURE — 83735 ASSAY OF MAGNESIUM: CPT | Performed by: EMERGENCY MEDICINE

## 2018-07-19 PROCEDURE — 80053 COMPREHEN METABOLIC PANEL: CPT | Performed by: EMERGENCY MEDICINE

## 2018-07-19 PROCEDURE — 83690 ASSAY OF LIPASE: CPT | Performed by: EMERGENCY MEDICINE

## 2018-07-19 PROCEDURE — 96361 HYDRATE IV INFUSION ADD-ON: CPT

## 2018-07-19 PROCEDURE — 81001 URINALYSIS AUTO W/SCOPE: CPT | Performed by: EMERGENCY MEDICINE

## 2018-07-19 PROCEDURE — 74177 CT ABD & PELVIS W/CONTRAST: CPT

## 2018-07-19 PROCEDURE — 85025 COMPLETE CBC W/AUTO DIFF WBC: CPT | Performed by: EMERGENCY MEDICINE

## 2018-07-19 PROCEDURE — 93005 ELECTROCARDIOGRAM TRACING: CPT

## 2018-07-19 PROCEDURE — 99285 EMERGENCY DEPT VISIT HI MDM: CPT | Mod: 25

## 2018-07-19 PROCEDURE — 96360 HYDRATION IV INFUSION INIT: CPT | Mod: 59

## 2018-07-19 RX ORDER — SODIUM CHLORIDE, SODIUM LACTATE, POTASSIUM CHLORIDE, CALCIUM CHLORIDE 600; 310; 30; 20 MG/100ML; MG/100ML; MG/100ML; MG/100ML
1000 INJECTION, SOLUTION INTRAVENOUS CONTINUOUS
Status: DISCONTINUED | OUTPATIENT
Start: 2018-07-19 | End: 2018-07-19 | Stop reason: HOSPADM

## 2018-07-19 RX ORDER — IOPAMIDOL 755 MG/ML
500 INJECTION, SOLUTION INTRAVASCULAR ONCE
Status: COMPLETED | OUTPATIENT
Start: 2018-07-19 | End: 2018-07-19

## 2018-07-19 RX ADMIN — IOPAMIDOL 95 ML: 755 INJECTION, SOLUTION INTRAVENOUS at 09:18

## 2018-07-19 RX ADMIN — SODIUM CHLORIDE, POTASSIUM CHLORIDE, SODIUM LACTATE AND CALCIUM CHLORIDE 1000 ML: 600; 310; 30; 20 INJECTION, SOLUTION INTRAVENOUS at 08:54

## 2018-07-19 RX ADMIN — SODIUM CHLORIDE 64 ML: 900 INJECTION, SOLUTION INTRAVENOUS at 09:18

## 2018-07-19 ASSESSMENT — ENCOUNTER SYMPTOMS
NERVOUS/ANXIOUS: 1
HEMATURIA: 0
APPETITE CHANGE: 1
NAUSEA: 1
PALPITATIONS: 1
DIARRHEA: 1
BLOOD IN STOOL: 0
VOMITING: 0
ABDOMINAL PAIN: 1
CHILLS: 0
FATIGUE: 1
LIGHT-HEADEDNESS: 1
FEVER: 0
DIZZINESS: 1

## 2018-07-19 NOTE — TELEPHONE ENCOUNTER
"Shereen Oliver is a 53 year old female  who calls with lower abdominal pain and left side pain.    NURSING ASSESSMENT:  The pain began 5 days ago.    Pain scale (0-10): Lower abdomen 4/10, left side 4/10  LMP  was  Postmenopausal.  The pain is described as lower abdominal cramping and dull ache in left side, which is without radiation.  Symptom associated with the abdominal pain: anorexia, diarrhea and constipation.  Felt constipated so ended up taking 2 Dulcolax tabs and stools eventually turned to watery and yellow after passing the hard stool.  Having episodes of head feeling \"tight\" and heart racing.  Denies nausea, vomiting, fever.  Eating and drinking much less, urinated last this morning but feels dry, mouth is dry.  Patient has had previous abdominal surgery, including cholecystectomy and bariatric surgery.  Pain is aggravated by nothing, and relieved by nothing.  Allergies:   Allergies   Allergen Reactions     No Known Drug Allergies        MEDICATIONS:   Taking medication(s) as prescribed? yes except for oral iron which causes GI upset/pain so she stopped it.  Is due this week for iron infusion.    Taking over the counter medication(s)? Yes, taking 2 new supplements, one for hair and nails, one for memory.  Any medication side effects? Unsure what symptoms may be related to meds    Any barriers to taking medication(s) as prescribed?  Yes  Medication(s) improving/managing symptoms?  No  Medication reconciliation completed: No    NURSING PLAN: Nursing advice to patient Be seen in ER now    RECOMMENDED DISPOSITION:  To ED, another person to drive -   Will comply with recommendation: Yes  If further questions/concerns or if symptoms do not improve, worsen or new symptoms develop, call your PCP or [facility] as soon as possible.    Guideline used:  Telephone Triage Protocols for Nurses, Third Edition, Sara Oscar RN    "

## 2018-07-19 NOTE — TELEPHONE ENCOUNTER
Pt called with FYI:  will not be able to have iron infusion. Insurance will not pay for it because her hemoglobin is WNL.  Hematology with try doing a medical letter of necessity to get it covered.

## 2018-07-19 NOTE — ED AVS SNAPSHOT
[facility] Emergency Department    [address]    [facility] [address]    Phone:  [phone]    Fax:  [phone]                                       Shereen Oliver   MRN: [id number]    Department:  [facility] Emergency Department   Date of Visit:  7/19/2018           Patient Information     Date Of Birth          1965        Your diagnoses for this visit were:     Abdominal cramping     Diarrhea, unspecified type        You were seen by Dayana Ibanez MD and Avtar Gaspar MD.      Follow-up Information     Schedule an appointment as soon as possible for a visit with Jody Ribera MD.    Specialty:  Internal Medicine    Contact information:    [address]  [facility] [address]  [phone]        Your next 10 appointments already scheduled     Jul 27, 2018  3:00 PM CDT   Level 1 with  INFUSION CHAIR 8   [facility] ([facility])    [facility]  [address]  [facility] [address] [phone]              24 Hour Appointment Hotline       To make an appointment at any [facility] call [phone] ([phone]). If you don't have a family doctor or clinic, we will help you find one. [place] clinics are conveniently located to serve the needs of you and your family.          ED Discharge Orders     Clostridium difficile toxin B           Enteric Bacteria and Virus Panel by GAUDENCIO Stool                    Review of your medicines      Our records show that you are taking the medicines listed below. If these are incorrect, please call your family doctor or clinic.        Dose / Directions Last dose taken    albuterol 108 (90 Base) MCG/ACT Inhaler   Commonly known as:  PROAIR HFA/PROVENTIL HFA/VENTOLIN HFA   Dose:  2 puff   Quantity:  1 Inhaler        Inhale 2 puffs into the lungs every 4 hours as needed for shortness of breath / dyspnea    Refills:  6        * fluticasone 50 MCG/ACT spray   Commonly known as:  FLONASE   Dose:  2 spray   Quantity:  1 Bottle        Spray 2 sprays into both nostrils daily   Refills:  0        * fluticasone 50 MCG/ACT spray   Commonly known as:  FLONASE   Dose:  1-2 spray   Quantity:  1 Bottle        Spray 1-2 sprays into both nostrils daily   Refills:  1        gabapentin 100 MG capsule   Commonly known as:  NEURONTIN   Quantity:  60 capsule        1-2 per day as needed for pain   Refills:  11        ibuprofen 800 MG tablet   Commonly known as:  ADVIL/MOTRIN   Dose:  800 mg   Quantity:  90 tablet        Take 1 tablet (800 mg) by mouth every 8 hours as needed for moderate pain   Refills:  1        levonorgestrel 20 MCG/24HR IUD   Commonly known as:  MIRENA   Dose:  1 each   Quantity:  1 each        1 each (20 mcg) by Intrauterine route once for 1 dose   Refills:  0        lidocaine-prilocaine cream   Commonly known as:  EMLA   Quantity:  70 g        Apply topically every 6 hours as needed for moderate pain   Refills:  1        omeprazole 40 MG capsule   Commonly known as:  priLOSEC   Dose:  40 mg   Quantity:  90 capsule        Take 1 capsule (40 mg) by mouth daily   Refills:  3        VITAMIN D (CHOLECALCIFEROL) PO   Dose:  2000 Units        Take 2,000 Units by mouth daily   Refills:  0        * Notice:  This list has 2 medication(s) that are the same as other medications prescribed for you. Read the directions carefully, and ask your doctor or other care provider to review them with you.            Procedures and tests performed during your visit     CBC with platelets differential    CT Abdomen Pelvis w Contrast    Comprehensive metabolic panel    EKG 12 lead    Lipase    Magnesium    UA with Microscopic      Orders Needing Specimen Collection     None      Pending Results     Date and Time Order Name Status Description    7/19/2018 0852 CT Abdomen Pelvis w Contrast Preliminary     7/18/2018 1520 BLOOD MORPHOLOGY  PATHOLOGIST REVIEW In process     7/18/2018 1520 PROTEIN ELECTROPHORESIS In process             Pending Culture Results     No orders found from 7/17/2018 to 7/20/2018.            Pending Results Instructions     If you had any lab results that were not finalized at the time of your Discharge, you can call the ED Lab Result RN at [phone]. You will be contacted by this team for any positive Lab results or changes in treatment. The nurses are available 7 days a week from 10A to 6:30P.  You can leave a message 24 hours per day and they will return your call.        Test Results From Your Hospital Stay        7/19/2018  9:46 AM      Component Results     Component Value Ref Range & Units Status    WBC 4.7 4.0 - 11.0 10e9/L Final    RBC Count 4.84 3.8 - 5.2 10e12/L Final    Hemoglobin 13.5 11.7 - 15.7 g/dL Final    Hematocrit 41.4 35.0 - 47.0 % Final    MCV 86 78 - 100 fl Final    MCH 27.9 26.5 - 33.0 pg Final    MCHC 32.6 31.5 - 36.5 g/dL Final    RDW 13.5 10.0 - 15.0 % Final    Platelet Count 120 (L) 150 - 450 10e9/L Final    Diff Method Automated Method  Final    % Neutrophils 70.7 % Final    % Lymphocytes 16.6 % Final    % Monocytes 9.8 % Final    % Eosinophils 2.3 % Final    % Basophils 0.4 % Final    % Immature Granulocytes 0.2 % Final    Nucleated RBCs 0 0 /100 Final    Absolute Neutrophil 3.3 1.6 - 8.3 10e9/L Final    Absolute Lymphocytes 0.8 0.8 - 5.3 10e9/L Final    Absolute Monocytes 0.5 0.0 - 1.3 10e9/L Final    Absolute Eosinophils 0.1 0.0 - 0.7 10e9/L Final    Absolute Basophils 0.0 0.0 - 0.2 10e9/L Final    Abs Immature Granulocytes 0.0 0 - 0.4 10e9/L Final    Absolute Nucleated RBC 0.0  Final         7/19/2018 10:06 AM      Component Results     Component Value Ref Range & Units Status    Sodium 141 133 - 144 mmol/L Final    Potassium 3.8 3.4 - 5.3 mmol/L Final    Chloride 107 94 - 109 mmol/L Final    Carbon Dioxide 28 20 - 32 mmol/L Final    Anion Gap 6 3 - 14 mmol/L Final    Glucose 88 70 - 99  mg/dL Final    Urea Nitrogen 10 7 - 30 mg/dL Final    Creatinine 0.84 0.52 - 1.04 mg/dL Final    GFR Estimate 70 >60 mL/min/1.7m2 Final    Non  GFR Calc    GFR Estimate If Black 85 >60 mL/min/1.7m2 Final    African American GFR Calc    Calcium 8.7 8.5 - 10.1 mg/dL Final    Bilirubin Total 0.6 0.2 - 1.3 mg/dL Final    Albumin 3.5 3.4 - 5.0 g/dL Final    Protein Total 6.9 6.8 - 8.8 g/dL Final    Alkaline Phosphatase 90 40 - 150 U/L Final    ALT 20 0 - 50 U/L Final    AST 18 0 - 45 U/L Final         7/19/2018 10:06 AM      Component Results     Component Value Ref Range & Units Status    Lipase 154 73 - 393 U/L Final         7/19/2018  9:33 AM      Narrative     CT ABDOMEN AND PELVIS WITH CONTRAST   7/19/2018 9:27 AM     HISTORY: Lower abdominal pain.     TECHNIQUE: CT abdomen and pelvis with 95 mL Isovue-370 IV. Radiation  dose for this scan was reduced using automated exposure control,  adjustment of the mA and/or kV according to patient size, or iterative  reconstruction technique.    COMPARISON: CT abdomen and pelvis 5/30/2010.    FINDINGS: There is no evidence for bowel obstruction. Postoperative  changes of the stomach and small bowel identified. Cholecystectomy.  The descending colon demonstrates a very mild degree of wall  thickening that may just relate to decompression. A very mild colitis  may be possible. There is small free pelvic fluid but no abscess or  free air.    Normal appendix. Liver, spleen, adrenals, pancreas, and kidneys show  no acute abnormalities. IUD within the uterus.        Impression     IMPRESSION:  1. Questionable finding of a very mild distal colitis. No abscess or  free air. Small pelvic fluid.  2. No other acute abnormality.         7/19/2018 10:23 AM      Component Results     Component Value Ref Range & Units Status    Color Urine Straw  Final    Appearance Urine Clear  Final    Glucose Urine Negative NEG^Negative mg/dL Final    Bilirubin Urine Negative NEG^Negative  Final    Ketones Urine Negative NEG^Negative mg/dL Final    Specific Gravity Urine 1.023 1.003 - 1.035 Final    Blood Urine Negative NEG^Negative Final    pH Urine 7.0 5.0 - 7.0 pH Final    Protein Albumin Urine Negative NEG^Negative mg/dL Final    Urobilinogen mg/dL 0.0 0.0 - 2.0 mg/dL Final    Nitrite Urine Negative NEG^Negative Final    Leukocyte Esterase Urine Negative NEG^Negative Final    Source Midstream Urine  Final    WBC Urine 1 0 - 5 /HPF Final    RBC Urine 0 0 - 2 /HPF Final    Squamous Epithelial /HPF Urine 2 (H) 0 - 1 /HPF Final         7/19/2018 10:06 AM      Component Results     Component Value Ref Range & Units Status    Magnesium 2.1 1.6 - 2.3 mg/dL Final                Clinical Quality Measure: Blood Pressure Screening     Your blood pressure was checked while you were in the emergency department today. The last reading we obtained was  BP: 140/71 . Please read the guidelines below about what these numbers mean and what you should do about them.  If your systolic blood pressure (the top number) is less than 120 and your diastolic blood pressure (the bottom number) is less than 80, then your blood pressure is normal. There is nothing more that you need to do about it.  If your systolic blood pressure (the top number) is 120-139 or your diastolic blood pressure (the bottom number) is 80-89, your blood pressure may be higher than it should be. You should have your blood pressure rechecked within a year by a primary care provider.  If your systolic blood pressure (the top number) is 140 or greater or your diastolic blood pressure (the bottom number) is 90 or greater, you may have high blood pressure. High blood pressure is treatable, but if left untreated over time it can put you at risk for heart attack, stroke, or kidney failure. You should have your blood pressure rechecked by a primary care provider within the next 4 weeks.  If your provider in the emergency department today gave you specific  instructions to follow-up with your doctor or provider even sooner than that, you should follow that instruction and not wait for up to 4 weeks for your follow-up visit.        Thank you for choosing [facility]       Thank you for choosing [facility] for your care. Our goal is always to provide you with excellent care. Hearing back from our patients is one way we can continue to improve our services. Please take a few minutes to complete the written survey that you may receive in the mail after you visit with us. Thank you!        [employer] Information     "[employer]" gives you secure access to your electronic health record. If you see a primary care provider, you can also send messages to your care team and make appointments. If you have questions, please call your primary care clinic.  If you do not have a primary care provider, please call [phone] and they will assist you.        Care EveryWhere ID     This is your Care EveryWhere ID. This could be used by other organizations to access your [facility] medical records  [id number]        Equal Access to Services     ALYCE MELCHOR AH: Hadii anthony Luke, brandon suarez, molly mack, yari arellano. So [facility] [phone].    ATENCIÓN: Si habla español, tiene a barragan disposición servicios gratuitos de asistencia lingüística. Llame al [phone].    We comply with applicable federal civil rights laws and [place] laws. We do not discriminate on the basis of race, color, national origin, age, disability, sex, sexual orientation, or gender identity.            After Visit Summary       This is your record. Keep this with you and show to your community pharmacist(s) and doctor(s) at your next visit.

## 2018-07-19 NOTE — ED PROVIDER NOTES
History     Chief Complaint:  Abdominal Pain    HPI   Shereen Oliver is a 53 year old female, with a history of GERD, s/p cholecystectomy, and s/p gastric bypass, who presents to the ED for evaluation of lower abdominal pain. The patient reports she developed intermittent abdominal pain after eating pizza and raw cookie dough 5 days ago. The pain is a dull achy sensation and radiates up her left quadrant. She developed fatigue, lightheadedness, dizziness, and palpitations 2 days ago. The patient notes she was initially constipated and took Dulcolax. She subsequently developed diarrhea a couple times per day. She also has a decreased appetite and mild nausea. The patient reports she is feeling anxious. The patient denies any fever, chills, vomiting, blood in stool, hematuria, or other urinary symptoms. Of note, the patient reports she is scheduled for an iron transfusion soon. She is post-menopausal but has had an IUD in for the past 5 years.      Allergies:  No known drug allergies    Medications:    Gabapentin  Mirena  Albuterol inhaler  Ibuprofen   Prilosec  Vitamin D    Past Medical History:    Anxiety  Intestinal malabsorption  Asthma  Neuropathy  Agoraphobia w/ panic disorder  Diabetes   Skin sensation disturbance  GERD  Genital herpes   Psoriasis    Past Surgical History:    Left vulva biopsy   Gastric bypass  Cervical fusion, C4-6  Cholecystectomy  D&C    Family History:    Gallbladder stones  HTN    Social History:  Smoking status: Never smoker    Alcohol use: Yes  Marital Status:  Single [1]     Review of Systems   Constitutional: Positive for appetite change and fatigue. Negative for chills and fever.   Cardiovascular: Positive for palpitations.   Gastrointestinal: Positive for abdominal pain, diarrhea and nausea. Negative for blood in stool and vomiting.   Genitourinary: Negative for hematuria.   Neurological: Positive for dizziness and light-headedness.   Psychiatric/Behavioral: The patient is  "nervous/anxious.    All other systems reviewed and are negative.    Physical Exam     Patient Vitals for the past 24 hrs:   BP Temp Temp src Heart Rate Resp SpO2 Height Weight   07/19/18 1100 140/71 - - - - 100 % - -   07/19/18 1015 104/70 - - 61 - 100 % - -   07/19/18 1000 125/78 - - 62 - 100 % - -   07/19/18 0845 121/82 - - 76 - - - -   07/19/18 0830 - - - - - 97 % - -   07/19/18 0804 144/79 97.9  F (36.6  C) Oral 71 16 99 % 1.727 m (5' 8\") 86.2 kg (190 lb)     Physical Exam  Vital signs and nursing notes reviewed.     Constitutional: laying on gurney appears comfortable  HENT: Oropharynx is clear and moist  Eyes: Conjunctivae are normal bilaterally. Pupils equal  Neck: normal range of motion  Cardiovascular: Normal rate, regular rhythm, normal heart sounds.   Pulmonary/Chest: Effort normal and breath sounds normal. No respiratory distress.   Abdominal: Soft. Bowel sounds are normal. mild tenderness to palpation of left mid abdomen. No rebound or guarding.   Musculoskeletal: No joint swelling or edema.   Neurological: Alert and oriented. No focal weakness  Skin: Skin is warm and dry. No rash noted.   Psych: normal affect    Emergency Department Course     ECG (8:49:33):  Rate 68 bpm. AK interval 142. QRS duration 84. QT/QTc 394/418. P-R-T axes 63 18 29. Normal sinus rhythm. Normal ECG. Interpreted at 0851 by Avtar Gaspar MD.    Imaging:  Radiographic findings were communicated with the patient who voiced understanding of the findings.    CT Abdomen Pelvis w Contrast  IMPRESSION:  1. Questionable finding of a very mild distal colitis. No abscess or  free air. Small pelvic fluid.  2. No other acute abnormality.  As read by Radiology.     Laboratory:  UA: Squamous epithelial 2(H), o/w Negative     Magnesium: 2.1    Lipase: 154  CBC: (L), o/w WNL (WBC 4.7, HGB 13.5)  CMP: o/w WNL (Creatinine 0.84)     Interventions:  0854: Lactated ringers 1L Bolus IV     Emergency Department Course:  Past medical " records, nursing notes, and vitals reviewed.  0815: I performed an exam of the patient and obtained history, as documented above.    IV inserted and blood drawn.    The patient was sent for an abdomen pelvis CT while in the emergency department, findings above.    1052: I rechecked the patient. Explained findings to patient.    Findings and plan explained to the Patient. Patient discharged home with instructions regarding supportive care, medications, and reasons to return. The importance of close follow-up was reviewed.     Impression & Plan      Medical Decision Making:  Patient is a 53 year old female presents with a variety symptomatic complaints. She said that she's been having this intermittent cramping abdominal pain, most in the left side, for the last couple days with some loose stools. She denies any bloody stools or vomiting. She also said she had what sounds like vasovagal type episode yesterday and feels palpitations at times. On presentation, she appears comfortable. She's in no distress. She has normal vital signs. EKG is unremarkable. Lab tests were obtained which does not show that she has any significant anemia at this time or other concerning findings. CT imaging shows findings that could suggest a very mild colitis, but again this is not specific. She also has been on stool softener recently, so it's unclear if this is causing some of her loose stools. I did recommend that she stop her stool softeners, and if she has any persistent loose or water stools in the next 24-48 hours, that she should collect a sample and bring it back to lab here for analysis. I advised her to make sure she's resting and stay well hydrated. If she has any fainting spells, chest pain, etc, she's to return here for reevaluation. Otherwise, follow-up with her primary care physician as an outpatient.     Diagnosis:    ICD-10-CM   1. Abdominal cramping R10.9       2. Diarrhea, unspecified type R19.7         Disposition:  Patient discharged to home     Rebekah May  7/19/2018   [facility] EMERGENCY DEPARTMENT    Scribe Disclosure:  I, Rebekah Yadira, am serving as a scribe at 8:15 AM on 7/19/2018 to document services personally performed by Avtar Gaspar MD based on my observations and the provider's statements to me.          Avtar Gaspar MD  07/19/18 6941

## 2018-07-19 NOTE — ED TRIAGE NOTES
Patient arrives here with generalized symptoms of abdominal pain, left side pain, palpitations, feeling faint, dry mouth, and feeling tired. Patient has been told she is anemic and starts treatment tomorrow. This started on Saturday after eating pizza and cookie dough. AOX4, ABC's intact

## 2018-07-19 NOTE — ED AVS SNAPSHOT
[facility] Emergency Department    [address]    [facility] [address]    Phone:  [phone]    Fax:  [phone]                                       Shereen Oliver   MRN: [id number]    Department:  [facility] Emergency Department   Date of Visit:  7/19/2018           After Visit Summary Signature Page     I have received my discharge instructions, and my questions have been answered. I have discussed any challenges I see with this plan with the nurse or doctor.    ..........................................................................................................................................  Patient/Patient Representative Signature      ..........................................................................................................................................  Patient Representative Print Name and Relationship to Patient    ..................................................               ................................................  Date                                            Time    ..........................................................................................................................................  Reviewed by Signature/Title    ...................................................              ..............................................  Date                                                            Time

## 2018-07-24 ENCOUNTER — TELEPHONE (OUTPATIENT)
Dept: ONCOLOGY | Facility: CLINIC | Age: 53
End: 2018-07-24

## 2018-07-25 ENCOUNTER — TRANSFERRED RECORDS (OUTPATIENT)
Dept: HEALTH INFORMATION MANAGEMENT | Facility: CLINIC | Age: 53
End: 2018-07-25

## 2018-07-31 ENCOUNTER — INFUSION THERAPY VISIT (OUTPATIENT)
Dept: INFUSION THERAPY | Facility: CLINIC | Age: 53
End: 2018-07-31
Attending: INTERNAL MEDICINE
Payer: COMMERCIAL

## 2018-07-31 VITALS
TEMPERATURE: 98.8 F | OXYGEN SATURATION: 97 % | SYSTOLIC BLOOD PRESSURE: 119 MMHG | RESPIRATION RATE: 16 BRPM | HEART RATE: 65 BPM | DIASTOLIC BLOOD PRESSURE: 71 MMHG

## 2018-07-31 DIAGNOSIS — K90.9 INTESTINAL MALABSORPTION, UNSPECIFIED TYPE: ICD-10-CM

## 2018-07-31 DIAGNOSIS — E61.1 IRON DEFICIENCY: Primary | ICD-10-CM

## 2018-07-31 DIAGNOSIS — Z98.84 S/P GASTRIC BYPASS: ICD-10-CM

## 2018-07-31 PROCEDURE — 96365 THER/PROPH/DIAG IV INF INIT: CPT

## 2018-07-31 PROCEDURE — 25000128 H RX IP 250 OP 636: Performed by: INTERNAL MEDICINE

## 2018-07-31 RX ADMIN — SODIUM CHLORIDE 250 ML: 9 INJECTION, SOLUTION INTRAVENOUS at 08:22

## 2018-07-31 RX ADMIN — FERRIC CARBOXYMALTOSE INJECTION 750 MG: 50 INJECTION, SOLUTION INTRAVENOUS at 08:22

## 2018-07-31 NOTE — MR AVS SNAPSHOT
After Visit Summary   7/31/2018    Shereen Oliver    MRN: [id number]           Patient Information     Date Of Birth          1965        Visit Information        Provider Department      7/31/2018 8:00 AM RH INFUSION CHAIR 5 [facility]        Today's Diagnoses     Iron deficiency    -  1    Intestinal malabsorption, unspecified type        S/P gastric bypass           Follow-ups after your visit        Your next 10 appointments already scheduled     Aug 10, 2018  8:00 AM CDT   Level 1 with RH INFUSION CHAIR 10   [facility] ([facility])    [facility]  [address]  [facility] [address]   [phone]            Aug 21, 2018  3:45 PM CDT   Return Visit with Bo Palencia MD   [facility] ([facility])    [facility]  [address]  [facility] [id number] [phone]              Who to contact     If you have questions or need follow up information about today's clinic visit or your schedule please contact [facility] INFUSION SERVICES directly at [phone].  Normal or non-critical lab and imaging results will be communicated to you by MyChart, letter or phone within 4 business days after the clinic has received the results. If you do not hear from us within 7 days, please contact the clinic through [employer] or phone. If you have a critical or abnormal lab result, we will notify you by phone as soon as possible.  Submit refill requests through [employer] or call your pharmacy and they will forward the refill request to us. Please allow 3 business days for your refill to be completed.          Additional Information About Your Visit        MyChart Information     [employer] gives you secure access to your electronic health record. If you see a primary care provider, you can also send  messages to your care team and make appointments. If you have questions, please call your primary care clinic.  If you do not have a primary care provider, please call [phone] and they will assist you.        Care EveryWhere ID     This is your Care EveryWhere ID. This could be used by other organizations to access your [facility] medical records  [id number]        Your Vitals Were     Pulse Temperature Respirations Pulse Oximetry          65 98.8  F (37.1  C) (Tympanic) 16 97%         Blood Pressure from Last 3 Encounters:   07/31/18 119/71   07/19/18 140/71   07/18/18 105/71    Weight from Last 3 Encounters:   07/19/18 86.2 kg (190 lb)   07/18/18 86.5 kg (190 lb 9.6 oz)   03/13/18 89.6 kg (197 lb 9.6 oz)              Today, you had the following     No orders found for display       Primary Care Provider Office Phone # Fax #    Jody Ribera [phone]       [address] [facility] [address]  [place] [address]        Equal Access to Services     [facility]: Hadii aad ku hadasho Soomaali, waaxda luqadaha, qaybta kaalmada adeegyada, yari barahona . So [facility] [phone].    ATENCIÓN: Si habla español, tiene a barragan disposición servicios gratuitos de asistencia lingüística. [facility] [phone].    We comply with applicable federal civil rights laws and [place] laws. We do not discriminate on the basis of race, color, national origin, age, disability, sex, sexual orientation, or gender identity.            Thank you!     Thank you for choosing [facility]  for your care. Our goal is always to provide you with excellent care. Hearing back from our patients is one way we can continue to improve our services. Please take a few minutes to complete the written survey that you may receive in the mail after your visit with us. Thank you!             Your Updated Medication List - Protect others around you: Learn how to safely use, store  and throw away your medicines at www.disposemymeds.org.          This list is accurate as of 7/31/18 11:22 AM.  Always use your most recent med list.                   Brand Name Dispense Instructions for use Diagnosis    albuterol 108 (90 Base) MCG/ACT Inhaler    PROAIR HFA/PROVENTIL HFA/VENTOLIN HFA    1 Inhaler    Inhale 2 puffs into the lungs every 4 hours as needed for shortness of breath / dyspnea    Intermittent asthma       * fluticasone 50 MCG/ACT spray    FLONASE    1 Bottle    Spray 2 sprays into both nostrils daily    Acute sinusitis with symptoms > 10 days       * fluticasone 50 MCG/ACT spray    FLONASE    1 Bottle    Spray 1-2 sprays into both nostrils daily    Acute sinusitis with coexisting condition requiring prophylactic treatment       gabapentin 100 MG capsule    NEURONTIN    60 capsule    1-2 per day as needed for pain    Neuropathy       ibuprofen 800 MG tablet    ADVIL/MOTRIN    90 tablet    Take 1 tablet (800 mg) by mouth every 8 hours as needed for moderate pain    S/P biopsy       levonorgestrel 20 MCG/24HR IUD    MIRENA    1 each    1 each (20 mcg) by Intrauterine route once for 1 dose    Contraception       lidocaine-prilocaine cream    EMLA    70 g    Apply topically every 6 hours as needed for moderate pain    S/P biopsy       omeprazole 40 MG capsule    priLOSEC    90 capsule    Take 1 capsule (40 mg) by mouth daily    Perforated ulcer (H)       VITAMIN D (CHOLECALCIFEROL) PO      Take 2,000 Units by mouth daily        * Notice:  This list has 2 medication(s) that are the same as other medications prescribed for you. Read the directions carefully, and ask your doctor or other care provider to review them with you.

## 2018-07-31 NOTE — PROGRESS NOTES
Infusion Nursing Note:  Shereen Oliver presents today for Injectafer, dose 1.    Patient seen by provider today: No   present during visit today: Not Applicable.    Note: N/A.    Intravenous Access:  Peripheral IV placed.    Treatment Conditions:  Not Applicable.      Post Infusion Assessment:  Patient tolerated infusion without incident.  Patient observed for 30 minutes post Injectafer per protocol.  Site patent and intact, free from redness, edema or discomfort.  No evidence of extravasations.  Access discontinued per protocol.    Discharge Plan:   Patient and/or family verbalized understanding of discharge instructions and all questions answered.  AVS to patient via [employer].  Patient will return 8/10 for next appointment.   Patient discharged in stable condition accompanied by: self.  Departure Mode: Ambulatory.    Sara Bains RN

## 2018-08-10 ENCOUNTER — INFUSION THERAPY VISIT (OUTPATIENT)
Dept: INFUSION THERAPY | Facility: CLINIC | Age: 53
End: 2018-08-10
Attending: INTERNAL MEDICINE
Payer: COMMERCIAL

## 2018-08-10 VITALS — HEART RATE: 63 BPM | TEMPERATURE: 98.1 F | SYSTOLIC BLOOD PRESSURE: 106 MMHG | DIASTOLIC BLOOD PRESSURE: 69 MMHG

## 2018-08-10 DIAGNOSIS — Z98.84 S/P GASTRIC BYPASS: ICD-10-CM

## 2018-08-10 DIAGNOSIS — K90.9 INTESTINAL MALABSORPTION, UNSPECIFIED TYPE: ICD-10-CM

## 2018-08-10 DIAGNOSIS — E61.1 IRON DEFICIENCY: Primary | ICD-10-CM

## 2018-08-10 PROCEDURE — 96374 THER/PROPH/DIAG INJ IV PUSH: CPT

## 2018-08-10 PROCEDURE — 25000128 H RX IP 250 OP 636: Performed by: INTERNAL MEDICINE

## 2018-08-10 RX ADMIN — FERRIC CARBOXYMALTOSE INJECTION 750 MG: 50 INJECTION, SOLUTION INTRAVENOUS at 08:30

## 2018-08-10 NOTE — MR AVS SNAPSHOT
After Visit Summary   8/10/2018    Shereen Oliver    MRN: [id number]           Patient Information     Date Of Birth          1965        Visit Information        Provider Department      8/10/2018 8:00 AM RH INFUSION CHAIR 8 [facility]        Today's Diagnoses     Iron deficiency    -  1    Intestinal malabsorption, unspecified type        S/P gastric bypass           Follow-ups after your visit        Your next 10 appointments already scheduled     Aug 21, 2018  3:45 PM CDT   Return Visit with Bo Palencia MD   [facility] ([facility])    [facility]  [address]  [facility] [address] [phone]              Who to contact     If you have questions or need follow up information about today's clinic visit or your schedule please contact [facility] INFUSION SERVICES directly at [phone].  Normal or non-critical lab and imaging results will be communicated to you by [employer], letter or phone within 4 business days after the clinic has received the results. If you do not hear from us within 7 days, please contact the clinic through [employer] or phone. If you have a critical or abnormal lab result, we will notify you by phone as soon as possible.  Submit refill requests through [employer] or call your pharmacy and they will forward the refill request to us. Please allow 3 business days for your refill to be completed.          Additional Information About Your Visit        MyChart Information     [employer] gives you secure access to your electronic health record. If you see a primary care provider, you can also send messages to your care team and make appointments. If you have questions, please call your primary care clinic.  If you do not have a primary care provider, please call [phone] and they will assist you.        Care EveryWhere ID     This is your Care  EveryWhere ID. This could be used by other organizations to access your [facility] medical records  [id number]        Your Vitals Were     Pulse Temperature                63 98.1  F (36.7  C) (Tympanic)           Blood Pressure from Last 3 Encounters:   08/10/18 106/69   07/31/18 119/71   07/19/18 140/71    Weight from Last 3 Encounters:   07/19/18 86.2 kg (190 lb)   07/18/18 86.5 kg (190 lb 9.6 oz)   03/13/18 89.6 kg (197 lb 9.6 oz)              Today, you had the following     No orders found for display       Primary Care Provider Office Phone # Fax #    Jody Ribera [phone]       [address] [facility] [address]  [facility] [address]        Equal Access to Services     [facility]: Hadii aad heather hadasho Soomaali, waaxda luqadaha, qaybta kaalmada adeegyada, yari barahona . So [facility] [phone].    ATENCIÓN: Si habla español, tiene a barragan disposición servicios gratuitos de asistencia lingüística. [facility] al [phone].    We comply with applicable federal civil rights laws and [place] laws. We do not discriminate on the basis of race, color, national origin, age, disability, sex, sexual orientation, or gender identity.            Thank you!     Thank you for choosing [facility] INFUSION SERVICES  for your care. Our goal is always to provide you with excellent care. Hearing back from our patients is one way we can continue to improve our services. Please take a few minutes to complete the written survey that you may receive in the mail after your visit with us. Thank you!             Your Updated Medication List - Protect others around you: Learn how to safely use, store and throw away your medicines at www.disposemymeds.org.          This list is accurate as of 8/10/18  9:17 AM.  Always use your most recent med list.                   Brand Name Dispense Instructions for use Diagnosis    albuterol 108 (90 Base) MCG/ACT Inhaler    PROAIR  HFA/PROVENTIL HFA/VENTOLIN HFA    1 Inhaler    Inhale 2 puffs into the lungs every 4 hours as needed for shortness of breath / dyspnea    Intermittent asthma       * fluticasone 50 MCG/ACT spray    FLONASE    1 Bottle    Spray 2 sprays into both nostrils daily    Acute sinusitis with symptoms > 10 days       * fluticasone 50 MCG/ACT spray    FLONASE    1 Bottle    Spray 1-2 sprays into both nostrils daily    Acute sinusitis with coexisting condition requiring prophylactic treatment       gabapentin 100 MG capsule    NEURONTIN    60 capsule    1-2 per day as needed for pain    Neuropathy       ibuprofen 800 MG tablet    ADVIL/MOTRIN    90 tablet    Take 1 tablet (800 mg) by mouth every 8 hours as needed for moderate pain    S/P biopsy       levonorgestrel 20 MCG/24HR IUD    MIRENA    1 each    1 each (20 mcg) by Intrauterine route once for 1 dose    Contraception       lidocaine-prilocaine cream    EMLA    70 g    Apply topically every 6 hours as needed for moderate pain    S/P biopsy       omeprazole 40 MG capsule    priLOSEC    90 capsule    Take 1 capsule (40 mg) by mouth daily    Perforated ulcer (H)       VITAMIN D (CHOLECALCIFEROL) PO      Take 2,000 Units by mouth daily        * Notice:  This list has 2 medication(s) that are the same as other medications prescribed for you. Read the directions carefully, and ask your doctor or other care provider to review them with you.

## 2018-08-10 NOTE — PROGRESS NOTES
Infusion Nursing Note:  Shereen Oliver presents today for injectafer, dose 2.    Patient seen by provider today: No   present during visit today: Not Applicable.    Note: N/A.    Intravenous Access:  Peripheral IV placed.    Treatment Conditions:  Not Applicable.      Post Infusion Assessment:  Patient tolerated infusion without incident.  Patient observed for 30 minutes post injectafer per protocol.  Blood return noted pre and post infusion.  Site patent and intact, free from redness, edema or discomfort.  No evidence of extravasations.  Access discontinued per protocol.    Discharge Plan:   AVS to patient via MYCHART.  Patient will return 8/21/18 for next appointment.   Patient discharged in stable condition accompanied by: self.  Departure Mode: Ambulatory.    Yaritza Nieto RN

## 2018-08-14 ENCOUNTER — TELEPHONE (OUTPATIENT)
Dept: OBGYN | Facility: CLINIC | Age: 53
End: 2018-08-14

## 2018-08-14 DIAGNOSIS — R10.2 PELVIC PAIN IN FEMALE: Primary | ICD-10-CM

## 2018-08-14 NOTE — TELEPHONE ENCOUNTER
Pt has Mirena.    Pt calls today to report intermittent lower pelvic pain for a couple weeks.  Pain feels like bad cramps (somewhat menstrual feeling cramps) and the area feels hot.  Normal BMs for her (is often constipated).      Started deplin (folic acid and vitamin b6) to treat anxiety since she doesn't absorb these.  She also had low ferritin.  Sx seem to start 2 weeks after starting deplin.      No fever.  No abnml discharge, or vaginal spotting.  Has not been getting periods with IUD.    Pt had CT in July that showed IUD is in place.    Had laser lipo last week on her belly.  She asked that I mention this.  Pt just not sure why she is having these pains.  Please advise.    Alcira GAN R.N.  [facility]

## 2018-08-14 NOTE — TELEPHONE ENCOUNTER
Please advise, would like her to get pelvic ultrasound   And make appointment   Ok to fit in/overbook  Dr. Diana Rosado, DO    Obstetrics and Gynecology  [facility] [place] [facility] [place]

## 2018-08-15 ENCOUNTER — RADIANT APPOINTMENT (OUTPATIENT)
Dept: ULTRASOUND IMAGING | Facility: CLINIC | Age: 53
End: 2018-08-15
Attending: FAMILY MEDICINE
Payer: COMMERCIAL

## 2018-08-15 DIAGNOSIS — R10.2 PELVIC PAIN IN FEMALE: ICD-10-CM

## 2018-08-15 PROCEDURE — 76856 US EXAM PELVIC COMPLETE: CPT | Performed by: FAMILY MEDICINE

## 2018-08-15 PROCEDURE — 76830 TRANSVAGINAL US NON-OB: CPT | Performed by: FAMILY MEDICINE

## 2018-08-16 NOTE — TELEPHONE ENCOUNTER
Pt calls for U/s results.  No tin yet.  She has a lot of lower pelvic pain and wanted to know asap.    Alcira GAN R.N.  [facility]

## 2018-08-16 NOTE — TELEPHONE ENCOUNTER
Hi, please let her know IUD is in place, no ovarian cysts, normal pelvic ultrasound, encourage her to take ibuprofen if she is able to take NSAIDs  And keep appointment   Dr. Diana Rosado, DO    Obstetrics and Gynecology  [facility] [place] [facility] [place]

## 2018-09-01 ENCOUNTER — HOSPITAL ENCOUNTER (OUTPATIENT)
Dept: LAB | Facility: CLINIC | Age: 53
Discharge: HOME | End: 2018-09-01
Attending: INTERNAL MEDICINE | Admitting: INTERNAL MEDICINE
Payer: COMMERCIAL

## 2018-09-01 DIAGNOSIS — E61.1 IRON DEFICIENCY: ICD-10-CM

## 2018-09-01 LAB
BASOPHILS # BLD AUTO: 0 10E9/L (ref 0–0.2)
BASOPHILS NFR BLD AUTO: 0.7 %
DIFFERENTIAL METHOD BLD: ABNORMAL
EOSINOPHIL # BLD AUTO: 0.1 10E9/L (ref 0–0.7)
EOSINOPHIL NFR BLD AUTO: 3 %
ERYTHROCYTE [DISTWIDTH] IN BLOOD BY AUTOMATED COUNT: 15.4 % (ref 10–15)
FERRITIN SERPL-MCNC: 335 NG/ML (ref 8–252)
HCT VFR BLD AUTO: 43.7 % (ref 35–47)
HGB BLD-MCNC: 14.3 G/DL (ref 11.7–15.7)
IMM GRANULOCYTES # BLD: 0 10E9/L (ref 0–0.4)
IMM GRANULOCYTES NFR BLD: 0.2 %
IRON SATN MFR SERPL: 52 % (ref 15–46)
IRON SERPL-MCNC: 129 UG/DL (ref 35–180)
LYMPHOCYTES # BLD AUTO: 1 10E9/L (ref 0.8–5.3)
LYMPHOCYTES NFR BLD AUTO: 24 %
MCH RBC QN AUTO: 29.4 PG (ref 26.5–33)
MCHC RBC AUTO-ENTMCNC: 32.7 G/DL (ref 31.5–36.5)
MCV RBC AUTO: 90 FL (ref 78–100)
MONOCYTES # BLD AUTO: 0.4 10E9/L (ref 0–1.3)
MONOCYTES NFR BLD AUTO: 8.9 %
NEUTROPHILS # BLD AUTO: 2.6 10E9/L (ref 1.6–8.3)
NEUTROPHILS NFR BLD AUTO: 63.2 %
NRBC # BLD AUTO: 0 10*3/UL
NRBC BLD AUTO-RTO: 0 /100
PLATELET # BLD AUTO: 121 10E9/L (ref 150–450)
RBC # BLD AUTO: 4.87 10E12/L (ref 3.8–5.2)
TIBC SERPL-MCNC: 249 UG/DL (ref 240–430)
WBC # BLD AUTO: 4.1 10E9/L (ref 4–11)

## 2018-09-01 PROCEDURE — 36415 COLL VENOUS BLD VENIPUNCTURE: CPT | Performed by: INTERNAL MEDICINE

## 2018-09-01 PROCEDURE — 85025 COMPLETE CBC W/AUTO DIFF WBC: CPT | Performed by: INTERNAL MEDICINE

## 2018-09-01 PROCEDURE — 82728 ASSAY OF FERRITIN: CPT | Performed by: INTERNAL MEDICINE

## 2018-09-01 PROCEDURE — 83550 IRON BINDING TEST: CPT | Performed by: INTERNAL MEDICINE

## 2018-09-01 PROCEDURE — 83540 ASSAY OF IRON: CPT | Performed by: INTERNAL MEDICINE

## 2018-09-04 ENCOUNTER — ONCOLOGY VISIT (OUTPATIENT)
Dept: ONCOLOGY | Facility: CLINIC | Age: 53
End: 2018-09-04
Attending: INTERNAL MEDICINE
Payer: COMMERCIAL

## 2018-09-04 VITALS
BODY MASS INDEX: 28.76 KG/M2 | TEMPERATURE: 98.7 F | DIASTOLIC BLOOD PRESSURE: 66 MMHG | SYSTOLIC BLOOD PRESSURE: 107 MMHG | WEIGHT: 189.8 LBS | RESPIRATION RATE: 16 BRPM | OXYGEN SATURATION: 98 % | HEART RATE: 56 BPM | HEIGHT: 68 IN

## 2018-09-04 DIAGNOSIS — E61.1 IRON DEFICIENCY: Primary | ICD-10-CM

## 2018-09-04 DIAGNOSIS — Z98.84 S/P GASTRIC BYPASS: ICD-10-CM

## 2018-09-04 PROCEDURE — 99213 OFFICE O/P EST LOW 20 MIN: CPT | Performed by: INTERNAL MEDICINE

## 2018-09-04 PROCEDURE — G0463 HOSPITAL OUTPT CLINIC VISIT: HCPCS

## 2018-09-04 ASSESSMENT — PAIN SCALES - GENERAL: PAINLEVEL: NO PAIN (0)

## 2018-09-04 NOTE — NURSING NOTE
"Oncology Rooming Note    September 4, 2018 3:45 PM   Shereen Oliver is a 53 year old female who presents for:    Chief Complaint   Patient presents with     Oncology Clinic Visit     Iron deficiency      Initial Vitals: /66  Pulse 56  Temp 98.7  F (37.1  C) (Tympanic)  Resp 16  Ht 1.727 m (5' 8\")  Wt 86.1 kg (189 lb 12.8 oz)  SpO2 98%  BMI 28.86 kg/m2 Estimated body mass index is 28.86 kg/(m^2) as calculated from the following:    Height as of this encounter: 1.727 m (5' 8\").    Weight as of this encounter: 86.1 kg (189 lb 12.8 oz). Body surface area is 2.03 meters squared.  No Pain (0) Comment: Data Unavailable   No LMP recorded. Patient is not currently having periods (Reason: IUD).  Allergies reviewed: Yes  Medications reviewed: Yes    Medications: Medication refills not needed today.  Pharmacy name entered into "[employer]": [facility] [address] [place] [address] [address] [facility] [address]    Clinical concerns: Iron deficiency      8 minutes for nursing intake (face to face time)     Althea Whitehead CMA            DISCHARGE PLAN:  Next appointments: See patient instruction section  Departure Mode: Ambulatory  Accompanied by: self  0 minutes for nursing discharge (face to face time)   Althea Whitehead CMA      "

## 2018-09-04 NOTE — LETTER
9/4/2018         RE: Shereen Oliver  [address]  [place] [address]        Dear Colleague,    Thank you for referring your patient, Shereen Oliver, to the [facility]. Please see a copy of my visit note below.    Hematology follow up    Iron deficiency anemia    Recent Labs   Lab Test  09/01/18   1051  06/25/18   1652  03/12/18   1125  11/20/12   1648  02/14/11   1609   PASHA  335*  10  6*  44  70   IRON  129  38  66  83  64   FEB  249  338  396  313  281   IRONSAT  52*  11*  17  26  23     Recent Labs   Lab Test  09/01/18   1051  07/19/18   0932  07/18/18   1528  03/12/18   1125  06/07/16   0848   04/14/15   0920  01/29/14   0907  11/20/12   1648   02/14/11   1609   B12   --    --    --   512   --    --   255  365  369   --   521   FOLIC   --    --   17.2   --    --    --    --   10.2  4.8   --    --    HGB  14.3  13.5  12.9  11.9  12.8   < >  12.7  12.8   --    < >  13.6   RETICABSCT   --    --   66.2   --    --    --    --    --    --    --    --    RETP   --    --   1.4   --    --    --    --    --    --    --    --     < > = values in this interval not displayed.     Shereen had iron deficiency anemia and was infused with injectafer twice - 750 mg IV elemental iron. Her iron panel has completely returned to normal and in fact high normal. She has ferritin of 335, iron binding capacity of 249 and  52 % saturation. She is not anemic anymore and has a Hgb of 14.3 which is one of her highest hemoglobin for herself. She could have her CBC checked once or twice a year. I would not schedule a follow up but would be most happy to see her as needed but would not schedule a follow up at this time.     Over 15 min of direct face to face time spent with patient with more than 50% time spent in counseling and coordinating care.      Again, thank you for allowing me to participate in the care of your patient.        Sincerely,        Bo Palencia MD

## 2018-09-10 NOTE — PROGRESS NOTES
Hematology follow up    Iron deficiency anemia    Recent Labs   Lab Test  09/01/18   1051  06/25/18   1652  03/12/18   1125  11/20/12   1648  02/14/11   1609   PASHA  335*  10  6*  44  70   IRON  129  38  66  83  64   FEB  249  338  396  313  281   IRONSAT  52*  11*  17  26  23     Recent Labs   Lab Test  09/01/18   1051  07/19/18   0932  07/18/18   1528  03/12/18   1125  06/07/16   0848   04/14/15   0920  01/29/14   0907  11/20/12   1648   02/14/11   1609   B12   --    --    --   512   --    --   255  365  369   --   521   FOLIC   --    --   17.2   --    --    --    --   10.2  4.8   --    --    HGB  14.3  13.5  12.9  11.9  12.8   < >  12.7  12.8   --    < >  13.6   RETICABSCT   --    --   66.2   --    --    --    --    --    --    --    --    RETP   --    --   1.4   --    --    --    --    --    --    --    --     < > = values in this interval not displayed.     Shereen had iron deficiency anemia and was infused with injectafer twice - 750 mg IV elemental iron. Her iron panel has completely returned to normal and in fact high normal. She has ferritin of 335, iron binding capacity of 249 and  52 % saturation. She is not anemic anymore and has a Hgb of 14.3 which is one of her highest hemoglobin for herself. She could have her CBC checked once or twice a year. I would not schedule a follow up but would be most happy to see her as needed but would not schedule a follow up at this time.     Over 15 min of direct face to face time spent with patient with more than 50% time spent in counseling and coordinating care.

## 2018-11-30 ENCOUNTER — TELEPHONE (OUTPATIENT)
Dept: NEUROSURGERY | Facility: CLINIC | Age: 53
End: 2018-11-30

## 2018-11-30 NOTE — TELEPHONE ENCOUNTER
Patient saw Dr Nguyễn 01/23/2018 and at that time he recommended observing symptoms and if there is declining in function or neurological issues he would recommend adjacent level acdf. (patient has hx of C4-6 ACDF in 2001 by Dr Shaikh)    Spoke to patient. She reports she has been doing Pilates and about 2 weeks ago started noticing pain in wrist/thumb and scapula to right arm. Patient denies any arm weakness at this time. Advised to alternate ice/heat OTC NSAIDs and tylenol and if no better in a week advised to follow up in clinic. Also, advised to use pain as guide and modify her pilates exercises. She voiced understanding and in agreement with plan.

## 2018-12-26 ENCOUNTER — NURSE TRIAGE (OUTPATIENT)
Dept: NURSING | Facility: CLINIC | Age: 53
End: 2018-12-26

## 2018-12-26 NOTE — PROGRESS NOTES
ASSESSMENT & PLAN    1. Left lumbar radiculopathy      Physical therapy: [facility] - [phone]  Pain is lumbar rather than IA hip   Rx for medrol/steroid. Take in the AM and with food. No other anti-inflammatories (Ibuprofen, Advil, Aleve, Motrin) while you're taking this.  Letter for gym: recommend no Pilates for month of January.  If extensions required will need follow-up evaluation otherwise ok to resume in February.    Follow-up if pain is not well controlled after medrol and at least 3-4 therapy sessions.    -----    SUBJECTIVE  Shereen Oliver is a/an 53 year old female who is seen as a self referral for evaluation of left hip pain. The patient is seen by themselves.    Onset: 2 day(s) ago. Patient describes injury as she bent over to get cat toys out from underneath the bed and felt a sharp pain down the the posterior and lateral aspect of left hip down to the left foot  Location of Pain: left posterior aspect of hip/buttocks with pain radiating down left posterior leg  Rating of Pain at worst: 10/10  Rating of Pain Currently: 4/10  Worsened by: crossing left leg over right knee  Better with: rest/activity avoidance  Treatments tried: rest/activity avoidance, ice, heat and Tylenol  Quality: sharp  Associated symptoms: numbness from waist down the left leg  Orthopedic history: YES - previous left hip pain Date: 2015, previous cervical fusion (C4-6) Date: 2008 which has caused numbness from waist down  Relevant surgical history: YES - previous cervical fusion (C4-6) Date: 2008  Patient Social History: works at an office    Patient's past medical, surgical, social, and family histories were reviewed today and no changes are noted.    REVIEW OF SYSTEMS:  10 point ROS is negative other than symptoms noted above in HPI, Past Medical History or as stated below  Constitutional: NEGATIVE for fever, chills, change in weight  Skin: NEGATIVE for worrisome rashes, moles or lesions  GI/: NEGATIVE  "for bowel or bladder changes  Neuro: NEGATIVE for weakness, dizziness or paresthesias    OBJECTIVE:  /72   Ht 1.727 m (5' 8\")   Wt 85.7 kg (189 lb)   BMI 28.74 kg/m     General: healthy, alert and in no distress  HEENT: no scleral icterus or conjunctival erythema  Skin: no suspicious lesions or rash. No jaundice.  CV: no pedal edema  Resp: normal respiratory effort without conversational dyspnea   Psych: normal mood and affect  Gait: normal steady gait with appropriate coordination and balance  Neuro: Normal light sensory exam of lower extremity,  DTR's 2+ patella and achilles bilaterally  MSK:  LEFT HIP  Inspection:    No obvious deformity or asymmetry, level pelvis  Active Range of Motion:     Flexion full, IR full, ER  full    Negative: Logroll, anterior impingement (FADIR)    THORACIC/LUMBAR SPINE  Inspection:    No gross deformity/asymmetry, level pelvis  Palpation:    Tender about the lumbar facet joints, left sciatic notch and right sciatic notch. Otherwise remainder of landmarks are nontender.  Range of Motion:     Lumbar flexion limited by pain  Strength:    quadriceps 5/5, hamstrings 5/5, gastrocsoleus 5/5, tibialis anterior 5/5, extensor hallicus longus 5/5  Special Tests:    Positive: slump test (left)    Independent visualization of the below image:    XR HIP LT G/E 2 VW 4/14/2015 10:33 AM     HISTORY: Pain in joint, pelvic region and thigh           IMPRESSION  IMPRESSION: Mild degenerative change. No other findings.     ANTONINA EATON MD    Patient's conditions were thoroughly discussed during today's visit with greater than 50% of the visit spent counseling the patient with total time spent face-to-face with the patient being 25 minutes.    Damaris Oquendo, DO [facility]    "

## 2018-12-26 NOTE — TELEPHONE ENCOUNTER
"Patient calling reporting she has a history of left hip pain. Reporting pain started over the past week > 3 days ago. Reporting patient bent over this weekend and \"had incredible pain going down leg.\" Reporting pain is occurring when bending knee out to put on a pair of socks. Stating she is able to bear full weight and walk. Applying ice. Stating she was told by one Dr in the past that she would need a hip replacement and one Dr advised against it.  Per guidelines advised to be seen with in 2-3 days. Caller verbalized understanding. Denies further questions.    Rachel Delgadillo RN  [facility]        .Reason for Disposition    [1] MODERATE pain (e.g., interferes with normal activities, limping) AND [2] present > 3 days    Additional Information    Negative: Looks like a broken bone or dislocated joint (e.g., crooked or deformed)    Negative: Sounds like a life-threatening emergency to the triager    Negative: Followed a hip injury    Negative: Leg pain is main symptom    Negative: Back pain radiating (shooting) into hip    Negative: [1] SEVERE pain (e.g., excruciating, unable to do any normal activities) AND [2] fever    Negative: Can't stand (bear weight) or walk    Negative: [1] Red area or streak AND [2] fever    Negative: Patient sounds very sick or weak to the triager    Negative: [1] SEVERE pain (e.g., excruciating, unable to do any normal activities) AND [2] not improved after 2 hours of pain medicine    Negative: [1] Looks infected (spreading redness, pus) AND [2] large red area (> 2 in. or 5 cm)    Negative: [1] Painful rash AND [2] multiple small blisters grouped together (i.e., dermatomal distribution or \"band\" or \"stripe\")    Negative: Looks like a boil, infected sore, or deep ulcer    Negative: [1] Localized rash is very painful AND [2] no fever    Negative: [1] Redness of the skin AND [2] no fever    Negative: Numbness in a leg or foot (i.e., loss of sensation)    Protocols used: HIP " PAIN-ADULT-AH

## 2018-12-27 ENCOUNTER — OFFICE VISIT (OUTPATIENT)
Dept: ORTHOPEDICS | Facility: CLINIC | Age: 53
End: 2018-12-27
Payer: COMMERCIAL

## 2018-12-27 VITALS
HEIGHT: 68 IN | WEIGHT: 189 LBS | SYSTOLIC BLOOD PRESSURE: 108 MMHG | BODY MASS INDEX: 28.64 KG/M2 | DIASTOLIC BLOOD PRESSURE: 72 MMHG

## 2018-12-27 DIAGNOSIS — M54.16 LEFT LUMBAR RADICULOPATHY: Primary | ICD-10-CM

## 2018-12-27 PROCEDURE — 99204 OFFICE O/P NEW MOD 45 MIN: CPT | Performed by: FAMILY MEDICINE

## 2018-12-27 RX ORDER — METHYLPREDNISOLONE 4 MG
TABLET, DOSE PACK ORAL
Qty: 21 TABLET | Refills: 0 | Status: SHIPPED | OUTPATIENT
Start: 2018-12-27 | End: 2019-12-03

## 2018-12-27 ASSESSMENT — MIFFLIN-ST. JEOR: SCORE: 1500.8

## 2018-12-27 NOTE — PATIENT INSTRUCTIONS
1. Left lumbar radiculopathy      Physical therapy: [facility] - [phone]  Pain is coming from your back rather than you hip joint  Rx for medrol/steroid. Take in the AM and with food. No other anti-inflammatories (Ibuprofen, Advil, Aleve, Motrin) while you're taking this.  Letter for gym: recommend no Pilates for month of January.  If extensions required will need follow-up evaluation otherwise ok to resume in February.    Follow-up if pain is not well controlled after medrol and at least 3-4 therapy sessions.

## 2018-12-27 NOTE — LETTER
December 27, 2018      Shereen Oliver  [address]   [place] [address]        To Whom It May Concern:    Shereen Oliver  was seen on 12/27/18. Due to current low back pain, patient is recommended no Pilates for the next 4 weeks. Ok to resume after 4 weeks if symptoms resolve.        Sincerely,        Damaris Oquendo DO

## 2018-12-27 NOTE — LETTER
12/27/2018         RE: Shereen Oliver  [address]  [place] [address]        Dear Colleague,    Thank you for referring your patient, Shereen Oliver, to the [facility]. Please see a copy of my visit note below.    ASSESSMENT & PLAN    1. Left lumbar radiculopathy      Physical therapy: [facility] - [phone]  Pain is lumbar rather than IA hip   Rx for medrol/steroid. Take in the AM and with food. No other anti-inflammatories (Ibuprofen, Advil, Aleve, Motrin) while you're taking this.  Letter for gym: recommend no Pilates for month of January.  If extensions required will need follow-up evaluation otherwise ok to resume in February.    Follow-up if pain is not well controlled after medrol and at least 3-4 therapy sessions.    -----    SUBJECTIVE  Shereen Oliver is a/an 53 year old female who is seen as a self referral for evaluation of left hip pain. The patient is seen by themselves.    Onset: 2 day(s) ago. Patient describes injury as she bent over to get cat toys out from underneath the bed and felt a sharp pain down the the posterior and lateral aspect of left hip down to the left foot  Location of Pain: left posterior aspect of hip/buttocks with pain radiating down left posterior leg  Rating of Pain at worst: 10/10  Rating of Pain Currently: 4/10  Worsened by: crossing left leg over right knee  Better with: rest/activity avoidance  Treatments tried: rest/activity avoidance, ice, heat and Tylenol  Quality: sharp  Associated symptoms: numbness from waist down the left leg  Orthopedic history: YES - previous left hip pain Date: 2015, previous cervical fusion (C4-6) Date: 2008 which has caused numbness from waist down  Relevant surgical history: YES - previous cervical fusion (C4-6) Date: 2008  Patient Social History: works at an office    Patient's past medical, surgical, social, and family histories were reviewed today and no changes are  "noted.    REVIEW OF SYSTEMS:  10 point ROS is negative other than symptoms noted above in HPI, Past Medical History or as stated below  Constitutional: NEGATIVE for fever, chills, change in weight  Skin: NEGATIVE for worrisome rashes, moles or lesions  GI/: NEGATIVE for bowel or bladder changes  Neuro: NEGATIVE for weakness, dizziness or paresthesias    OBJECTIVE:  /72   Ht 1.727 m (5' 8\")   Wt 85.7 kg (189 lb)   BMI 28.74 kg/m      General: healthy, alert and in no distress  HEENT: no scleral icterus or conjunctival erythema  Skin: no suspicious lesions or rash. No jaundice.  CV: no pedal edema  Resp: normal respiratory effort without conversational dyspnea   Psych: normal mood and affect  Gait: normal steady gait with appropriate coordination and balance  Neuro: Normal light sensory exam of lower extremity,  DTR's 2+ patella and achilles bilaterally  MSK:  LEFT HIP  Inspection:    No obvious deformity or asymmetry, level pelvis  Active Range of Motion:     Flexion full, IR full, ER  full    Negative: Logroll, anterior impingement (FADIR)    THORACIC/LUMBAR SPINE  Inspection:    No gross deformity/asymmetry, level pelvis  Palpation:    Tender about the lumbar facet joints, left sciatic notch and right sciatic notch. Otherwise remainder of landmarks are nontender.  Range of Motion:     Lumbar flexion limited by pain  Strength:    quadriceps 5/5, hamstrings 5/5, gastrocsoleus 5/5, tibialis anterior 5/5, extensor hallicus longus 5/5  Special Tests:    Positive: slump test (left)    Independent visualization of the below image:    XR HIP LT G/E 2 VW 4/14/2015 10:33 AM     HISTORY: Pain in joint, pelvic region and thigh           IMPRESSION  IMPRESSION: Mild degenerative change. No other findings.     ANTONINA EATON MD    Patient's conditions were thoroughly discussed during today's visit with greater than 50% of the visit spent counseling the patient with total time spent face-to-face with the patient being " 25 minutes.    Damaris Oquendo DO [facility]      Again, thank you for allowing me to participate in the care of your patient.        Sincerely,        Damaris Oquendo DO

## 2018-12-28 ENCOUNTER — OFFICE VISIT (OUTPATIENT)
Dept: URGENT CARE | Facility: URGENT CARE | Age: 53
End: 2018-12-28
Payer: COMMERCIAL

## 2018-12-28 VITALS — OXYGEN SATURATION: 99 % | DIASTOLIC BLOOD PRESSURE: 62 MMHG | SYSTOLIC BLOOD PRESSURE: 98 MMHG | HEART RATE: 68 BPM

## 2018-12-28 DIAGNOSIS — J01.10 ACUTE FRONTAL SINUSITIS, RECURRENCE NOT SPECIFIED: Primary | ICD-10-CM

## 2018-12-28 PROCEDURE — 99213 OFFICE O/P EST LOW 20 MIN: CPT | Performed by: PHYSICIAN ASSISTANT

## 2018-12-28 RX ORDER — FLUTICASONE PROPIONATE 50 MCG
1 SPRAY, SUSPENSION (ML) NASAL DAILY
Qty: 1 BOTTLE | Refills: 0 | Status: SHIPPED | OUTPATIENT
Start: 2018-12-28 | End: 2019-12-03

## 2018-12-28 NOTE — PROGRESS NOTES
SUBJECTIVE:  Shereen Oliver is a 53 year old female here with concerns about sinus infection. She states onset of symptoms were 4 day(s) ago with pain. Prior to this she had nasal drainage and sinus symptoms for a week or so. Pain is worse on the Left side. Course of illness is worsening. Severity moderately severe  Current and Associated symptoms: nasal congestion, facial pain/pressure and tooth pain  Predisposing factors include HX of recurrent sinusitis. Recent treatment has included: OTC meds and sinus rinses.     Past Medical History:   Diagnosis Date     Agoraphobia with panic disorder     elevators and cars     Diabetes mellitus     type 2 resolved after gastric bypass     Disturbance of skin sensation     left leg as residual of neck surgery     Gastro-oesophageal reflux disease      Genital herpes, unspecified      Intrinsic asthma, unspecified     exercise induced     Obesity      Other psoriasis      Spasm of muscle     left leg as residual of neck surgery     Social History     Tobacco Use     Smoking status: Never Smoker     Smokeless tobacco: Never Used   Substance Use Topics     Alcohol use: Yes     Comment: seldom       ROS:  Review of systems otherwise negative except as stated above.    OBJECTIVE:  BP 98/62   Pulse 68   SpO2 99%   Exam:GENERAL APPEARANCE: healthy, alert and no distress  EYES: EOMI,  PERRL, conjunctiva clear  HENT: ear canals and TM's normal. Maxillary sinus tendnerness  NECK: supple, nontender, no lymphadenopathy  RESP: lungs clear to auscultation - no rales, rhonchi or wheezes  CV: regular rates and rhythm, normal S1 S2, no murmur noted  NEURO: Normal strength and tone, sensory exam grossly normal,  normal speech and mentation  SKIN: no suspicious lesions or rashes    ASSESSMENT / PLAN:  1. Acute frontal sinusitis, recurrence not specified  Supportive cares encouraged, fluids and rest. Continue with nasal rinses.   - fluticasone (FLONASE) 50 MCG/ACT nasal spray; Spray 1 spray into  both nostrils daily  Dispense: 1 Bottle; Refill: 0  - amoxicillin-clavulanate (AUGMENTIN) 875-125 MG tablet; Take 1 tablet by mouth 2 times daily for 10 days  Dispense: 20 tablet; Refill: 0    I have discussed the patient's diagnosis and my plan of treatment with the patient. We went over any labs or imaging. Patient is aware to come back in with worsening symptoms or if no relief despite treatment plan.  Patient verbalizes understanding. All questions were addressed and answered.   Fay Madsen PA-C

## 2019-01-03 NOTE — PROGRESS NOTES
[facility] Initial Evaluation  Subjective:  HPI                    Objective:  System    Physical Exam    [facility]: Physical Therapy Initial Evaluation   Jan 4, 2019  Subjective:   Chief Complaint: left leg worse than the right   Pain: Right sided low back pain, but with pain down the left lower extremity (buttock, wraps around over the knee and into the calf)    Numbness/Tingling: Is numb in the left leg from previous spinal injury   Weakness: Stairs feel weak, both up and down   Stiffness: always stiff in the hips, hard to know with the low back because she doesn't move around a lot.   New/Recurrent/Chronic: Recurrent  DOI/onset: 1 week ago   Referral Date: 12/27/2018 - Dr. Damaris Oquendo  Mechanism of onset: bending over  PMH/surgical history/trauma:    - numbnes/tingling   - osteoarthritis   - overweight   - History of foot drop   - C4/5/6 Fusion   - Chart indicates history of gastric bypass and neuropathy  General health as reported by patient: Good    Medications: Antibiotic, steroid (recently completed)  Occupation:  Job duties: prolonged sitting, computer work  Previous Treatment (Effect): PT (helpful for past episodes), steroid pack (helped)  Imaging: No imaging on file  AM/PM: worse in the evening  Quality of Pain: aching on the right, nervy/burn on the left.   Pain: 6/10 at present, 3/10 at best, 9/10 at worst  Worse: cycling (worse in the leg), piliates (increased pain since), increased weight  Better: position with a body pillow, gabapentin,   Progression of Symptoms since onset: fluctuates, but not significant improvement or worsening one way or another.    Hx of Falls: had a fall when the leg collapsed   Sleeping: Can take extra time to fall asleep, but not every night.  Current Functional Status: stairs - use of handrail, did have to take one step at a time, but now can go foot over foot.  ; pilates - did not get pain  during pilates, but has noticed a general increase since starting piliates  ; cycling - increased left leg burning with cycling.    Previous Functional Status: Cycling - 20 miles on a normal day, 50 miles on the weekend.   Current HEP/exercise regimen: Pilates, cycling, has an ellipitcal (has not been on it because she is not sure if it is safe)  Transportation to Therapy: Independent with transportation  Live with Others: Lives alone, one cat  Red Flags:   - Patient denies the following: Pain with Cough / Sneeze / Laughing ; Night Pain ; Fever ; Change in Bowel or Bladder ;   - Patient reports the following: Weakness ; Numbness/Tingling (from spinal damage at the neck) ;    Patient's Goal(s): Wants to know when it will be safe to get back [facility].     Objective:    Posture: Sway back posture    Gait: Increased pelvic rotation with decreased hip extension    AROM: (Major, Moderate, Minimal or Nil loss)  Movement Loss Vinnie Mod Min Nil Pain   Flexion    x    Extension   x     Side Gliding L   x x Sxs in L Hip   Side Gliding R   x x cc       Neurological:    Motor Deficit:  Myotomes R L   L1-2 (hip flexion) 5 5   L3 (knee extension) 5 5   L4 (ankle DF) 5 5   L5 (g. toe ext) 5 5   S1 (knee flex) 5 5     Reflexes:    R L   Patellar 3+ 3+   Achilles 0+ 3+     Sensation/Proprioception: decreased sensation on the left compared to the right at the anterior thigh, dorsum of the foot, and the posterior heel.     Dural Signs:   R L   Slump (-) (-)       Repeated movement testing:   (During: produces, abolishes, increases, decreases, no effect, centralizing, peripheralizing; After: better, worse, no better, no worse, no effect, centralized, peripheralized)      Pre-test Symptoms Lying: 3/10 in low back; symptoms radiating to ankle    Symptoms During Symptoms After ROM increased ROM decreased No Effect   CHACHO        Rep CHACHO        Prone Lying        SAMUEL same same      EIL same same      Rep EIL same Better (centralize, abolished  LBP over 3 sets) x       Lumbar Mobility/Spring Testing: Pain with PAs at L5/S1    Palpation: Tenderness to palpation at the sacrum      Assessment/Plan:    Patient is a 53 year old female with lumbar and left lower extremity complaints.    Patient has the following significant findings with corresponding treatment plan.                Referring Diagnosis: Left Lumbar Radiculopathy  Pain -  hot/cold therapy, manual therapy, splint/taping/bracing/orthotics, self management, education, directional preference exercise and home program  Decreased ROM/flexibility - manual therapy, therapeutic exercise, therapeutic activity and home program  Decreased joint mobility - manual therapy, therapeutic exercise, therapeutic activity and home program  Impaired gait - gait training and home program  Decreased function - therapeutic activities and home program  Impaired posture - neuro re-education, therapeutic activities and home program      Therapy Evaluation Codes:   1) History comprised of:   Personal factors that impact the plan of care:      Past/current experiences.    Comorbidity factors that impact the plan of care are:      Osteoarthritis, Overweight and History of Lumbar Conditions/Foot Drop.     Medications impacting care: Steroids.  2) Examination of Body Systems comprised of:   Body structures and functions that impact the plan of care:      Lumbar spine and Sacral illiac joint.   Activity limitations that impact the plan of care are:      Sports and Stairs.  3) Clinical presentation characteristics are:   Evolving/Changing.  4) Decision-Making    Moderate complexity using standardized patient assessment instrument and/or measureable assessment of functional outcome.  Cumulative Therapy Evaluation is: Moderate complexity.    Previous and current functional limitations:  (See Goal Flow Sheet for this information)    Short term and Long term goals: (See Goal Flow Sheet for this information)     Communication ability:   Patient appears to be able to clearly communicate and understand verbal and written communication and follow directions correctly.  Treatment Explanation - The following has been discussed with the patient:   RX ordered/plan of care  Anticipated outcomes  Possible risks and side effects  This patient would benefit from PT intervention to resume normal activities.   Rehab potential is good.    Frequency:  1 X week, once daily  Duration:  for 5 weeks tapering to 2 X a month over 4 weeks  Discharge Plan:  Achieve all LTG.  Independent in home treatment program.  Reach maximal therapeutic benefit.    Please refer to the daily flowsheet for treatment today, total treatment time and time spent performing 1:1 timed codes.

## 2019-01-04 ENCOUNTER — THERAPY VISIT (OUTPATIENT)
Dept: PHYSICAL THERAPY | Facility: CLINIC | Age: 54
End: 2019-01-04
Payer: COMMERCIAL

## 2019-01-04 DIAGNOSIS — M54.42 RIGHT-SIDED LOW BACK PAIN WITH LEFT-SIDED SCIATICA: ICD-10-CM

## 2019-01-04 DIAGNOSIS — M54.16 LEFT LUMBAR RADICULOPATHY: ICD-10-CM

## 2019-01-04 PROCEDURE — 97110 THERAPEUTIC EXERCISES: CPT | Mod: GP | Performed by: PHYSICAL THERAPIST

## 2019-01-04 PROCEDURE — 97162 PT EVAL MOD COMPLEX 30 MIN: CPT | Mod: GP | Performed by: PHYSICAL THERAPIST

## 2019-01-07 PROBLEM — M54.42 RIGHT-SIDED LOW BACK PAIN WITH LEFT-SIDED SCIATICA: Status: ACTIVE | Noted: 2019-01-07

## 2019-01-09 ENCOUNTER — TELEPHONE (OUTPATIENT)
Dept: INTERNAL MEDICINE | Facility: CLINIC | Age: 54
End: 2019-01-09

## 2019-01-15 ENCOUNTER — TELEPHONE (OUTPATIENT)
Dept: PHYSICAL THERAPY | Facility: CLINIC | Age: 54
End: 2019-01-15

## 2019-01-15 DIAGNOSIS — M54.42 RIGHT-SIDED LOW BACK PAIN WITH LEFT-SIDED SCIATICA: ICD-10-CM

## 2019-01-15 NOTE — TELEPHONE ENCOUNTER
DISCHARGE REPORT    Updated as of January 15, 2019.    Discharge report is from initial evaluation on Jan 4, 2019.       SUBJECTIVE  Subjective changes noted by patient: Patient called to report that her symptoms have completely resolved and that she does not feel she needs additional PT.   Changes in function:  Patient has not returned to clinic to assess.   Adverse reaction to treatment or activity: Patient has not returned to clinic to assess.     OBJECTIVE  Changes noted in objective findings:  Patient has failed to return to therapy so current objective findings are unknown.  Objective: See initial evaluation note.      ASSESSMENT/PLAN  STG/LTGs have been met or progress has been made towards goals:  Goal status unknown  Assessment of Progress: Patient has not returned to therapy.  Current status is unknown and discharge G code cannot be reported.  Tenisha continues to require the following intervention to meet STG and LTG's:  Unknown, patient has not returned to therapy.     Recommendations:  She has not yet returned to [facility]. She was told that if she has recovered there should be no reason not to continue and that much of the core work in [facility] would be beneficial to the long-term health of back. She will attempt return to [facility] and report back if new issues arise.     Please refer to the daily flowsheet for treatment today, total treatment time and time spent performing 1:1 timed codes.

## 2019-01-22 ENCOUNTER — OFFICE VISIT (OUTPATIENT)
Dept: INTERNAL MEDICINE | Facility: CLINIC | Age: 54
End: 2019-01-22
Payer: COMMERCIAL

## 2019-01-22 VITALS
SYSTOLIC BLOOD PRESSURE: 104 MMHG | DIASTOLIC BLOOD PRESSURE: 71 MMHG | WEIGHT: 197.2 LBS | HEART RATE: 78 BPM | OXYGEN SATURATION: 99 % | BODY MASS INDEX: 29.89 KG/M2 | RESPIRATION RATE: 14 BRPM | TEMPERATURE: 98.3 F | HEIGHT: 68 IN

## 2019-01-22 DIAGNOSIS — Z98.84 S/P GASTRIC BYPASS: ICD-10-CM

## 2019-01-22 DIAGNOSIS — H57.11 PAIN OF RIGHT EYE: ICD-10-CM

## 2019-01-22 DIAGNOSIS — D69.6 THROMBOCYTOPENIA (H): ICD-10-CM

## 2019-01-22 DIAGNOSIS — G62.9 NEUROPATHY: Primary | ICD-10-CM

## 2019-01-22 DIAGNOSIS — G95.89 MYELOMALACIA OF CERVICAL CORD (H): ICD-10-CM

## 2019-01-22 PROCEDURE — 99214 OFFICE O/P EST MOD 30 MIN: CPT | Performed by: INTERNAL MEDICINE

## 2019-01-22 RX ORDER — GABAPENTIN 100 MG/1
CAPSULE ORAL
Qty: 60 CAPSULE | Refills: 11 | Status: SHIPPED | OUTPATIENT
Start: 2019-01-22 | End: 2019-12-03

## 2019-01-22 ASSESSMENT — MIFFLIN-ST. JEOR: SCORE: 1537.99

## 2019-01-22 NOTE — LETTER
[place] [address]  [place] [address]  [phone]          January 22, 2019    RE:  Shereen Oliver                                                                                                                                                       [address]   [place] [address]            To whom it may concern:    Shereen Oliver is under my professional care. Because of her neck and back issues I have advised her not to attend Pilates exercise. therefore I request that she be let out of her contract.    If you have any questions or if you need additional information in regard to this matter, please feel free to call or contact me at [facility], [address], [place] [address] or phone # [phone].     Sincerely,        Jody Ribera MD

## 2019-01-22 NOTE — PROGRESS NOTES
SUBJECTIVE:   Shereen Oliver is a 53 year old female who presents to clinic today for the following health issues:      Med refill   Gabapentin for nerve pain.    Duration: 10 years.    Description (location/character/radiation): Nerve pain    Intensity:  moderate    Accompanying signs and symptoms:      History (similar episodes/previous evaluation): stable pattern    Precipitating or alleviating factors: activity can make it worse    Therapies tried and outcome: Neurontin       HPI:  She has chronic neck and low back issues and has done a nice job of staying physically active while not usually making her symptoms worse. But she started Pilates this year and seems to have flared her neck and back. She has stopped and is working with PT to get better. But she would like a letter to get out of her Pilates contract.    The Neurontin she does not use all the time. Just when she is not doing well. She wants to know that is ok. She especially uses it to sleep.     She is followed by Dr Del Cid or Hematology for her thrombocytopenia. Has had no problems with bleeding.     She has increasing faint redness of her right eye with occasional mild pain. No change in vision.    Problem list and histories reviewed & adjusted, as indicated.  Additional history: as documented    BP Readings from Last 3 Encounters:   01/22/19 104/71   12/28/18 98/62   12/27/18 108/72    Wt Readings from Last 3 Encounters:   01/22/19 89.4 kg (197 lb 3.2 oz)   12/27/18 85.7 kg (189 lb)   09/04/18 86.1 kg (189 lb 12.8 oz)                    Reviewed and updated as needed this visit by clinical staff  Tobacco  Allergies  Meds  Med Hx  Surg Hx  Fam Hx  Soc Hx      Reviewed and updated as needed this visit by Provider         ROS:  Constitutional, HEENT, cardiovascular, pulmonary, GI, , musculoskeletal, neuro, skin, endocrine and psych systems are negative, except as otherwise noted.    OBJECTIVE:     /71 (BP Location: Right arm, Patient  "Position: Sitting, Cuff Size: Adult Regular)   Pulse 78   Temp 98.3  F (36.8  C) (Oral)   Resp 14   Ht 1.727 m (5' 8\")   Wt 89.4 kg (197 lb 3.2 oz)   SpO2 99%   Breastfeeding? No   BMI 29.98 kg/m    Body mass index is 29.98 kg/m .  GENERAL: healthy, alert and no distress  HEENT: right eye mildly erythematous    NECK: no adenopathy, no asymmetry, masses, or scars and thyroid normal to palpation  RESP: lungs clear to auscultation - no rales, rhonchi or wheezes  CV: regular rate and rhythm, normal S1 S2, no S3 or S4, no murmur, click or rub, no peripheral edema and peripheral pulses strong  ABDOMEN: soft, nontender, no hepatosplenomegaly, no masses and bowel sounds normal  MS: no gross musculoskeletal defects noted, no edema  SKIN: no suspicious lesions or rashes  NEURO: Normal strength and tone, mentation intact and speech normal  PSYCH: mentation appears normal, affect normal/bright      ASSESSMENT/PLAN:       1. Neuropathy  under good control Continue current medications.   - gabapentin (NEURONTIN) 100 MG capsule; 1-2 per day as needed for pain  Dispense: 60 capsule; Refill: 11    2. Pain of right eye  Will refer to   - OPHTHALMOLOGY ADULT REFERRAL    3. Thrombocytopenia (H)  Stable without bleeding    4. Myelomalacia of cervical cord (H)  Doing well, letter given to get out of Pilates contract    5. S/P gastric bypass  Doing a good job of managing her wt over the years but she is up again and reminded her I would like her to get back to 180 lbs. She is agreeable to that.      Follow up in 12 months     Jody Ribera MD  [facility]    "

## 2019-02-21 ENCOUNTER — TRANSFERRED RECORDS (OUTPATIENT)
Dept: HEALTH INFORMATION MANAGEMENT | Facility: CLINIC | Age: 54
End: 2019-02-21

## 2019-02-21 ENCOUNTER — TELEPHONE (OUTPATIENT)
Dept: NEUROSURGERY | Facility: CLINIC | Age: 54
End: 2019-02-21

## 2019-02-21 DIAGNOSIS — M54.12 CERVICAL RADICULOPATHY: Primary | ICD-10-CM

## 2019-02-21 NOTE — TELEPHONE ENCOUNTER
Patient LVM, to discuss RUE numbness, and weakness.    S/P C4-6 ACDF in 2001, by TAM Shaikh/TAM Nguyễn 01/23/2018/    Ph. [phone]    Please advise.

## 2019-02-21 NOTE — TELEPHONE ENCOUNTER
Called and spoke to patient she is reporting an increase in RUE numbness and weakness. Reviewed chart with patient LOV with Dr Nguyễn on 01/23/2018 and at that time he recommended observing symptoms and if there is declining in function or neurological issues he would recommend adjacent level acdf. (patient has hx of C4-6 ACDF in 2001 by Dr Shaikh).    Patient would like to return to see Dr. Nguyễn. Will forward to care team to see if they think an updated MRI would be needed, last MRI 8/2017. If MRI is approved patient would like to have it open-sided at [facility].

## 2019-02-25 ENCOUNTER — TELEPHONE (OUTPATIENT)
Dept: NEUROSURGERY | Facility: CLINIC | Age: 54
End: 2019-02-25

## 2019-02-25 NOTE — TELEPHONE ENCOUNTER
REASON FOR CALL: Pt called wanting to move appt up sooner. States that she is experiencing numbness in the arms and is worried that it will get worst by the time of her appt.Was only able to move pt up one day sooner. She is wondering if she can get results via telephone just to ease her mind if it's not anything serious. [place] is requesting for images and reports to be sent/push from [facility].

## 2019-03-05 ENCOUNTER — OFFICE VISIT (OUTPATIENT)
Dept: NEUROSURGERY | Facility: CLINIC | Age: 54
End: 2019-03-05
Attending: NURSE PRACTITIONER
Payer: COMMERCIAL

## 2019-03-05 VITALS — HEIGHT: 68 IN | BODY MASS INDEX: 29.86 KG/M2 | WEIGHT: 197 LBS | OXYGEN SATURATION: 100 % | HEART RATE: 65 BPM

## 2019-03-05 DIAGNOSIS — M54.12 CERVICAL RADICULITIS: Primary | ICD-10-CM

## 2019-03-05 DIAGNOSIS — M54.12 CERVICAL RADICULOPATHY: ICD-10-CM

## 2019-03-05 DIAGNOSIS — G95.89 MYELOMALACIA OF CERVICAL CORD (H): ICD-10-CM

## 2019-03-05 DIAGNOSIS — Z98.1 HISTORY OF FUSION OF CERVICAL SPINE: ICD-10-CM

## 2019-03-05 PROCEDURE — 99213 OFFICE O/P EST LOW 20 MIN: CPT | Performed by: NEUROLOGICAL SURGERY

## 2019-03-05 PROCEDURE — G0463 HOSPITAL OUTPT CLINIC VISIT: HCPCS

## 2019-03-05 ASSESSMENT — MIFFLIN-ST. JEOR: SCORE: 1532.09

## 2019-03-05 ASSESSMENT — PAIN SCALES - GENERAL: PAINLEVEL: MODERATE PAIN (4)

## 2019-03-05 NOTE — NURSING NOTE
"Shereen Oliver is a 54 year old female who presents for:  Chief Complaint   Patient presents with     Neurologic Problem     Continued neck pain with right arm numbness         Vitals:    Vitals:    03/05/19 0957   Pulse: 65   SpO2: 100%   Weight: 197 lb (89.4 kg)   Height: 5' 8\" (1.727 m)       BMI:  Estimated body mass index is 29.95 kg/m  as calculated from the following:    Height as of this encounter: 5' 8\" (1.727 m).    Weight as of this encounter: 197 lb (89.4 kg).    Pain Score:  Moderate Pain (4)      Do you feel safe in your environment?  Yes      Marivel Oliver          "

## 2019-03-05 NOTE — PATIENT INSTRUCTIONS
1. Please schedule your physical therapy.      2. Please contact the clinic if pain persists at [phone].

## 2019-03-05 NOTE — PROGRESS NOTES
Neurosurgery Follow Up Clinic Visit      Shereen Oliver returns for follow up visit regarding her right upper extremity pain and numbness and paresthesias and also neck pain.    Currently the patient describes having pain in her neck and her right upper extremity with numbness and paresthesias especially in the index finger.  She has no focal weakness.    Exam is stable with no focal motor deficits    We reviewed the MRI of her cervical spine shows C4-6 fusion with C3-4 moderate stenosis and C6-7 mild stenosis and myelomalacia at the level of the previous fusion.    Plan:   Patient requested physical therapy at [facility]  She may also increase her Neurontin as needed, the patient is taking 100 mg occasionally but nothing schedule.  I explained that she can take 300 p.o. 3 times daily if she needed to.

## 2019-03-12 ENCOUNTER — TRANSFERRED RECORDS (OUTPATIENT)
Dept: HEALTH INFORMATION MANAGEMENT | Facility: CLINIC | Age: 54
End: 2019-03-12

## 2019-03-25 ENCOUNTER — TELEPHONE (OUTPATIENT)
Dept: INTERNAL MEDICINE | Facility: CLINIC | Age: 54
End: 2019-03-25

## 2019-03-25 DIAGNOSIS — R21 RASH: ICD-10-CM

## 2019-03-25 NOTE — TELEPHONE ENCOUNTER
Reason for Call:  Medication or medication refill:    Do you use a [facility]? No     Name of the pharmacy and phone number for the current request:  [facility] [address]    Name of the medication requested: acyclovir    Other request: none    Can we leave a detailed message on this number? YES    Phone number patient can be reached at: Home number on file [phone] (home)    Best Time: asap    Call taken on 3/25/2019 at 8:24 AM by Eladia Diaz

## 2019-03-25 NOTE — TELEPHONE ENCOUNTER
"Acyclovir 400 mg last prescribed 5/15/17 for #21 with 5 refills, associated diagnosis \"rash\". MARIO Oscar R.N.    "

## 2019-03-26 RX ORDER — ACYCLOVIR 400 MG/1
400 TABLET ORAL 3 TIMES DAILY
Qty: 21 TABLET | Refills: 5 | Status: SHIPPED | OUTPATIENT
Start: 2019-03-26 | End: 2020-09-29

## 2019-03-27 DIAGNOSIS — J01.10 ACUTE FRONTAL SINUSITIS, RECURRENCE NOT SPECIFIED: ICD-10-CM

## 2019-03-27 NOTE — TELEPHONE ENCOUNTER
"Requested Prescriptions   Pending Prescriptions Disp Refills     fluticasone (FLONASE) 50 MCG/ACT nasal spray  Last Written Prescription Date:  02/11/2018  Last Fill Quantity: 1 bottle,  # refills: 1   Last office visit: No previous visit found with prescribing provider:  01/22/2018 Jody Ribera MD    Future Office Visit:           Sig: Spray 1 spray into both nostrils daily    Inhaled Steroids Protocol Failed - 3/27/2019  8:37 AM       Failed - Asthma control assessment score within normal limits in last 6 months    Please review ACT score.   ACT Total Scores 1/29/2014 4/14/2015 11/3/2015   ACT TOTAL SCORE 25 25 -   ASTHMA ER VISITS 0 = None 0 = None -   ASTHMA HOSPITALIZATIONS 0 = None 0 = None -   ACT TOTAL SCORE (Goal Greater than or Equal to 20) - - 25   In the past 12 months, how many times did you visit the emergency room for your asthma without being admitted to the hospital? - - 0   In the past 12 months, how many times were you hospitalized overnight because of your asthma? - - 0            Passed - Patient is age 12 or older       Passed - Medication is active on med list       Passed - Recent (6 mo) or future (30 days) visit within the authorizing provider's specialty    Patient had office visit in the last 6 months or has a visit in the next 30 days with authorizing provider or within the authorizing provider's specialty.  See \"Patient Info\" tab in inbasket, or \"Choose Columns\" in Meds & Orders section of the refill encounter.              "

## 2019-03-28 RX ORDER — FLUTICASONE PROPIONATE 50 MCG
1 SPRAY, SUSPENSION (ML) NASAL DAILY
OUTPATIENT
Start: 2019-03-28

## 2019-03-28 NOTE — TELEPHONE ENCOUNTER
Medication was prescribed in Urgent care.  Patient needs to follow up with Primary Care provider for ongoing medication management.  Chelsea LEHMAN RN - Triage  [facility]

## 2019-04-11 ENCOUNTER — TRANSFERRED RECORDS (OUTPATIENT)
Dept: HEALTH INFORMATION MANAGEMENT | Facility: CLINIC | Age: 54
End: 2019-04-11

## 2019-04-19 ENCOUNTER — TRANSFERRED RECORDS (OUTPATIENT)
Dept: HEALTH INFORMATION MANAGEMENT | Facility: CLINIC | Age: 54
End: 2019-04-19

## 2019-05-01 ENCOUNTER — TELEPHONE (OUTPATIENT)
Dept: NEUROSURGERY | Facility: CLINIC | Age: 54
End: 2019-05-01

## 2019-05-01 DIAGNOSIS — Z98.1 HISTORY OF FUSION OF CERVICAL SPINE: ICD-10-CM

## 2019-05-01 DIAGNOSIS — M54.12 CERVICAL RADICULOPATHY: Primary | ICD-10-CM

## 2019-05-01 NOTE — TELEPHONE ENCOUNTER
REASON FOR CALL: Pt called asking if she can get another PT order. She is currently w/[facility] and has met most of her goals there, but she is still having muscle pain/problems on the right side of her neck. Pt states that [facility] does not specialize in that, and suggest that pt see Patrick June from [facility]. Pt is wondering if it is possible for our clinic to place an order for her to see him at that clinic. Pt left their clinic phone number: [phone]. Can give her a call back if there are questions.

## 2019-05-01 NOTE — TELEPHONE ENCOUNTER
Faxed order to [facility]  May 1, 2019 to fax number [phone].    Right Fax confirmed at 1:50 PM    Eboni Rod RN

## 2019-05-01 NOTE — TELEPHONE ENCOUNTER
Called [facility] for fax number. LM. PT order placed. Will fax when they call back with number.

## 2019-05-06 ENCOUNTER — TRANSFERRED RECORDS (OUTPATIENT)
Dept: HEALTH INFORMATION MANAGEMENT | Facility: CLINIC | Age: 54
End: 2019-05-06

## 2019-06-03 ENCOUNTER — TELEPHONE (OUTPATIENT)
Dept: SURGERY | Facility: CLINIC | Age: 54
End: 2019-06-03

## 2019-06-03 NOTE — TELEPHONE ENCOUNTER
"Called patient to follow up report of return of ulcer sx.    Patient stated that last week and the week before is when her sx started.    Patient states that he stomach started to hurt when she eats or drinks and then gets incredibly bloated and miserable in between meals.  She also reports she feels that her pouch is very tight.    States she also started to get the \"super gurgly over to the left\" of her stomach.   The pain can be burning and tingling - uncomfortable - not an ache but acidy.     Reports she dug out her prescription of Carafate that is a couple of years old and started eating super bland foods in the past couple of days.    States she took the Carafate for 3 days in a row and this helped.  Also took omeprazole for 3 days and through the weekend so feels she is covered.    Was also constipated and alleviated that    Sx are much better today.     No NSAID'S, no smoking, ETOH - maybe socially 1 glass of wine on the weekend but none in a long time, and decaf. No carbonation.    Would like Carafate refill.because her current one is a couple of years old.   Has quite a few of Omeprazole left.     Her pharmacy is  on [address]     Patient was last seen in clinic 3/2018 for similar sx.  Informed patient because she has not been seen in a year and sx are recurring will most likely need to be seen.  May need EGD because this seems to be recurrent sx,  and patient had gastric bypass in 2010 as well as a perforation in 2010.  She was also seen 3/12/18 for suspected anastomotic ulcer.    Informed patient I would discuss with BILL Fox and get back to her.  Patient verbalized understanding and is agreeable to plan.  Sayda Mejia, MS, RD, RN        "

## 2019-06-03 NOTE — TELEPHONE ENCOUNTER
Patient needs to be seen in clinic to evaluate since this is a repeat of ulcer sx in about 15 months, plus patient had PO perf in 2010.    Called patient and offered her openings tomorrow and the next 2 days.    Patient states that the symptoms aren't that bad now and can wait.  Would be okay with 6/10 but prefer 6/14.  This writer is unable to see any availability at this time.  Will need to d/w the  and get back to the patient in the morning.    Patient is good with that plan.    Informed patient that if she has blood coming from vomit or stool, pain not controlled by antiacids or Carafate, increase HR, light headedness or dizziness - to get to the ED.  Patient verbalized understanding and is agreeable to plan.  Sayda Mejia, MS, RD, RN

## 2019-06-03 NOTE — TELEPHONE ENCOUNTER
Reason for call:  Other   Patient called regarding (reason for call): call back  Additional comments: PT IS HAVING ISSUES WITH ULCERS AND WOULD LIKE TO KNOW IF SHE CAN GET A REFILL OF SUCRALFATE WITHOUT HAVING TO COME IN.     Phone number to reach patient:  Cell number on file:    Telephone Information:   Mobile [phone]       Best Time:  ANYTIME    Can we leave a detailed message on this number?  YES

## 2019-06-04 NOTE — TELEPHONE ENCOUNTER
Called patient to schedule an appointment with our PA. Patient stated she is feeling better and does not want to be seen. I told patient that provider may still want to see her.I will check with provider and get back to patient. We scheduled an appointment for 6/14/19 to hold the spot.    I spoke with Sayda dee RN regarding patient coming in our not. Per Sayda RN it is advised that patient be seen, however it is up to the patient if she wants to come in our not. I called patient back and left a voicemail that we recommend she be seen but it is up to her. I requested patient call back and let me know if she will be keeping or cancelling her appointment on 6/14/19.

## 2019-06-18 PROBLEM — D50.9 ANEMIA, IRON DEFICIENCY: Status: ACTIVE | Noted: 2019-06-18

## 2019-08-14 ENCOUNTER — TRANSFERRED RECORDS (OUTPATIENT)
Dept: HEALTH INFORMATION MANAGEMENT | Facility: CLINIC | Age: 54
End: 2019-08-14

## 2019-09-15 ENCOUNTER — OFFICE VISIT (OUTPATIENT)
Dept: URGENT CARE | Facility: URGENT CARE | Age: 54
End: 2019-09-15
Payer: COMMERCIAL

## 2019-09-15 VITALS
OXYGEN SATURATION: 98 % | HEIGHT: 68 IN | TEMPERATURE: 98.4 F | HEART RATE: 64 BPM | DIASTOLIC BLOOD PRESSURE: 64 MMHG | BODY MASS INDEX: 30.77 KG/M2 | SYSTOLIC BLOOD PRESSURE: 122 MMHG | WEIGHT: 203 LBS | RESPIRATION RATE: 18 BRPM

## 2019-09-15 DIAGNOSIS — L03.315 CELLULITIS, PERINEUM: Primary | ICD-10-CM

## 2019-09-15 PROCEDURE — 99213 OFFICE O/P EST LOW 20 MIN: CPT | Performed by: FAMILY MEDICINE

## 2019-09-15 RX ORDER — CEPHALEXIN 500 MG/1
500 CAPSULE ORAL 3 TIMES DAILY
Qty: 21 CAPSULE | Refills: 0 | Status: SHIPPED | OUTPATIENT
Start: 2019-09-15 | End: 2019-09-22

## 2019-09-15 ASSESSMENT — PAIN SCALES - GENERAL: PAINLEVEL: MILD PAIN (3)

## 2019-09-15 ASSESSMENT — MIFFLIN-ST. JEOR: SCORE: 1559.3

## 2019-09-16 NOTE — PROGRESS NOTES
SUBJECTIVE:   Shereen Oliver is a 54 year old female presenting with a chief complaint of a growing large painful cyst at the right perineal area/right buttock area.    Onset of symptoms was two weeks ago.  Initially, there was a little spot at that area, and patient squeezed a little bit of white pus out today.  .      Course of illness is worsening. .    Severity worsened (the cyst grew a lot after squeezing the cyst and inserting a pin into the lesion).    Current and Associated symptoms: as listed above.   Treatment measures tried include hot packs (hot rags) for the past week.  .  .    She rides a bike often and and she has an upcoming bike trip in 2 days.  .    Past Medical History:   Diagnosis Date     Agoraphobia with panic disorder     elevators and cars     Diabetes mellitus     type 2 resolved after gastric bypass     Disturbance of skin sensation     left leg as residual of neck surgery     Gastro-oesophageal reflux disease      Genital herpes, unspecified      Intrinsic asthma, unspecified     exercise induced     Obesity      Other psoriasis      Spasm of muscle     left leg as residual of neck surgery     Current Outpatient Medications   Medication Sig Dispense Refill     acyclovir (ZOVIRAX) 400 MG tablet Take 1 tablet (400 mg) by mouth 3 times daily 21 tablet 5     albuterol (PROAIR HFA, PROVENTIL HFA, VENTOLIN HFA) 108 (90 BASE) MCG/ACT inhaler Inhale 2 puffs into the lungs every 4 hours as needed for shortness of breath / dyspnea 1 Inhaler 6     fluticasone (FLONASE) 50 MCG/ACT nasal spray Spray 1 spray into both nostrils daily 1 Bottle 0     fluticasone (FLONASE) 50 MCG/ACT spray Spray 1-2 sprays into both nostrils daily 1 Bottle 1     fluticasone (FLONASE) 50 MCG/ACT spray Spray 2 sprays into both nostrils daily 1 Bottle 0     gabapentin (NEURONTIN) 100 MG capsule 1-2 per day as needed for pain 60 capsule 11     levonorgestrel (MIRENA) 20 MCG/24HR IUD 1 each (20 mcg) by Intrauterine route once for  "1 dose 1 each 0     lidocaine-prilocaine (EMLA) cream Apply topically every 6 hours as needed for moderate pain 70 g 1     omeprazole (PRILOSEC) 40 MG capsule Take 1 capsule (40 mg) by mouth daily 90 capsule 3     VITAMIN D, CHOLECALCIFEROL, PO Take 2,000 Units by mouth daily       ibuprofen (ADVIL,MOTRIN) 800 MG tablet Take 1 tablet (800 mg) by mouth every 8 hours as needed for moderate pain (Patient not taking: Reported on 9/15/2019) 90 tablet 1     methylPREDNISolone (MEDROL DOSEPAK) 4 MG tablet therapy pack Follow Package Directions (Patient not taking: Reported on 1/22/2019) 21 tablet 0     Social History     Tobacco Use     Smoking status: Never Smoker     Smokeless tobacco: Never Used   Substance Use Topics     Alcohol use: Yes     Comment: seldom       ROS:  CONSTITUTIONAL:NEGATIVE  for fevers.   INTEGUMENTARY/SKIN: POSITIVE for a painful cyst in the perineal area.     OBJECTIVE:  /64 (BP Location: Right arm, Patient Position: Sitting, Cuff Size: Adult Large)   Pulse 64   Temp 98.4  F (36.9  C) (Oral)   Resp 18   Ht 1.727 m (5' 8\")   Wt 92.1 kg (203 lb)   LMP  (LMP Unknown)   SpO2 98%   Breastfeeding? No   BMI 30.87 kg/m    GENERAL APPEARANCE: healthy, alert and no distress  SKIN: the right perineal area has two indurated, tender areas with some overlying erythema.  No obvious discharge.  No hematomas.  No fluctuance.      ASSESSMENT:  Cellulitis at the right perineum    PLAN:  I placed Povidone-Iodine gauze onto the wound.  I next punctured the tender area with a Sterile needle.  No pus could be expressed.      Outpatient Rx:  Cephalexin     Place warmth onto the painful tender lesions for 10-15 minutes at a time, every 2-3 hours while awake.      follow up if not better in 10-14 days. Sooner if the pain keeps worsening or if fever appears.      Darrius Lewis MD    "

## 2019-09-16 NOTE — PATIENT INSTRUCTIONS
Place warmth onto the painful tender lesions for 10-15 minutes at a time, every 2-3 hours while awake.      follow up if not better in 10-14 days. Sooner if the pain keeps worsening or if fever appears.  .

## 2019-10-03 ENCOUNTER — OFFICE VISIT (OUTPATIENT)
Dept: OBGYN | Facility: CLINIC | Age: 54
End: 2019-10-03
Payer: COMMERCIAL

## 2019-10-03 VITALS
HEIGHT: 68 IN | WEIGHT: 202.2 LBS | SYSTOLIC BLOOD PRESSURE: 98 MMHG | BODY MASS INDEX: 30.65 KG/M2 | DIASTOLIC BLOOD PRESSURE: 60 MMHG

## 2019-10-03 DIAGNOSIS — L02.92 BOIL: Primary | ICD-10-CM

## 2019-10-03 PROCEDURE — 99213 OFFICE O/P EST LOW 20 MIN: CPT | Performed by: FAMILY MEDICINE

## 2019-10-03 ASSESSMENT — MIFFLIN-ST. JEOR: SCORE: 1555.67

## 2019-10-03 NOTE — NURSING NOTE
"Chief Complaint   Patient presents with     Vaginal Problem     cyst--it's smaller but she hasn't been biking--Urgent care drained it--pt drained it too--clear fluid       Initial BP 98/60 (BP Location: Right arm, Patient Position: Sitting, Cuff Size: Adult Large)   Ht 1.727 m (5' 8\")   Wt 91.7 kg (202 lb 3.2 oz)   LMP  (LMP Unknown)   BMI 30.74 kg/m   Estimated body mass index is 30.74 kg/m  as calculated from the following:    Height as of this encounter: 1.727 m (5' 8\").    Weight as of this encounter: 91.7 kg (202 lb 3.2 oz).  BP completed using cuff size: large    Questioned patient about current smoking habits.  Pt. has never smoked.      No obstetric history on file.    The following HM Due: NONE           "

## 2019-10-03 NOTE — PROGRESS NOTES
SUBJECTIVE:  Shereen Oliver is an 54 year old  woman who presents for   Gyn exam for vaginal cyst, irritated with biking. Presents with boil.    Past Medical History:   Diagnosis Date     Agoraphobia with panic disorder     elevators and cars     Diabetes mellitus     type 2 resolved after gastric bypass     Disturbance of skin sensation     left leg as residual of neck surgery     Gastro-oesophageal reflux disease      Genital herpes, unspecified      Intrinsic asthma, unspecified     exercise induced     Obesity      Other psoriasis      Spasm of muscle     left leg as residual of neck surgery          Family History   Problem Relation Age of Onset     Gastrointestinal Disease Mother         gallbladder stones     Hypertension Father      Family History Negative Brother      Diabetes Maternal Grandfather        Past Surgical History:   Procedure Laterality Date     BIOPSY VULVA Left 11/10/2015    Procedure: BIOPSY VULVA;  Surgeon: Diana Rosado DO;  Location: [facility] OR     C IUD,MIRENA  6/2010     C VILLA W/O FACETEC FORAMOT/DSKC 1/2 VRT SEG, CERVICAL  2001    fusion c4-5-6,herniated discs     CHOLECYSTECTOMY, LAPOROSCOPIC  2007    Cholecystectomy, Laparoscopic     COLONOSCOPY N/A 1/20/2017    Procedure: COLONOSCOPY;  Surgeon: Tank Peguero MD;  Location:  GI     D & C  6/29/10    removal of polyp and placement of Mirena     DAVINCI BYPASS GASTRIC BRANT-EN-Y         Current Outpatient Medications   Medication     acyclovir (ZOVIRAX) 400 MG tablet     albuterol (PROAIR HFA, PROVENTIL HFA, VENTOLIN HFA) 108 (90 BASE) MCG/ACT inhaler     fluticasone (FLONASE) 50 MCG/ACT spray     gabapentin (NEURONTIN) 100 MG capsule     fluticasone (FLONASE) 50 MCG/ACT nasal spray     fluticasone (FLONASE) 50 MCG/ACT spray     ibuprofen (ADVIL,MOTRIN) 800 MG tablet     levonorgestrel (MIRENA) 20 MCG/24HR IUD     lidocaine-prilocaine (EMLA) cream     methylPREDNISolone (MEDROL DOSEPAK) 4 MG tablet therapy pack     omeprazole  "(PRILOSEC) 40 MG capsule     VITAMIN D, CHOLECALCIFEROL, PO     No current facility-administered medications for this visit.      Allergies   Allergen Reactions     No Known Drug Allergies        Social History     Tobacco Use     Smoking status: Never Smoker     Smokeless tobacco: Never Used   Substance Use Topics     Alcohol use: Yes     Comment: seldom       Review Of Systems  Ears/Nose/Throat: negative  Respiratory: No shortness of breath, dyspnea on exertion, cough, or hemoptysis  Cardiovascular: negative  Gastrointestinal: negative  Genitourinary: negative  Constitutional, HEENT, cardiovascular, pulmonary, GI, , musculoskeletal, neuro, skin, endocrine and psych systems are negative, except as otherwise noted.      OBJECTIVE:  BP 98/60 (BP Location: Right arm, Patient Position: Sitting, Cuff Size: Adult Large)   Ht 1.727 m (5' 8\")   Wt 91.7 kg (202 lb 3.2 oz)   LMP  (LMP Unknown)   BMI 30.74 kg/m    General appearance: healthy, alert and no distress  Skin: Skin color, texture, turgor normal. No rashes or lesions.  Ears: negative  Nose/Sinuses: Nares normal. Septum midline. Mucosa normal. No drainage or sinus tenderness.  Oropharynx: Lips, mucosa, and tongue normal. Teeth and gums normal.  Neck: Neck supple. No adenopathy. Thyroid symmetric, normal size,, Carotids without bruits.  Lungs: negative, Percussion normal. Good diaphragmatic excursion. Lungs clear  Heart: negative, PMI normal. No lifts, heaves, or thrills. RRR. No murmurs, clicks gallops or rub  Breasts: deferred  Pelvic: External genitalia and vagina normal.  Buttocks:  Right perineum/buttock area with 2 mm boil     ASSESSMENT:  Shereen Oliver is an 54 year old  woman who presents for   Gyn exam for vaginal cyst, irritated with biking.   Buttock boil    PLAN:  Dx: healing boil  1)  Boil: now healing, was seen in urgent care and given antibiotics.  The size was about a 1 cm and now   2 mm area deep below the surface. Return if the boil does not " heal.     Dr. Diana Rosado, DO    OB/GYN   [facility]

## 2019-10-03 NOTE — PATIENT INSTRUCTIONS
Return in June for physical.     Dr. Diana Rosado, DO    Obstetrics and Gynecology  [facility] [place] [facility] [place]

## 2019-11-01 NOTE — TELEPHONE ENCOUNTER
Returned call to patient informed her that Amanda BABB did review her MRI. Per Amanda BABB ...Fusion is solid. She has stenosis at the levels above and below fusion. I would recommend injection and keep her existing appt.    Patient will continue to use ice which has been helpful and discuss recommendations further at her upcoming visit.    Detail Level: Detailed

## 2019-11-04 ENCOUNTER — HEALTH MAINTENANCE LETTER (OUTPATIENT)
Age: 54
End: 2019-11-04

## 2019-12-03 ENCOUNTER — HOSPITAL ENCOUNTER (OUTPATIENT)
Dept: MAMMOGRAPHY | Facility: CLINIC | Age: 54
Discharge: HOME | End: 2019-12-03
Attending: INTERNAL MEDICINE | Admitting: INTERNAL MEDICINE
Payer: COMMERCIAL

## 2019-12-03 ENCOUNTER — OFFICE VISIT (OUTPATIENT)
Dept: INTERNAL MEDICINE | Facility: CLINIC | Age: 54
End: 2019-12-03
Payer: COMMERCIAL

## 2019-12-03 VITALS
RESPIRATION RATE: 16 BRPM | OXYGEN SATURATION: 96 % | HEIGHT: 67 IN | DIASTOLIC BLOOD PRESSURE: 72 MMHG | HEART RATE: 66 BPM | WEIGHT: 206.4 LBS | BODY MASS INDEX: 32.39 KG/M2 | SYSTOLIC BLOOD PRESSURE: 108 MMHG | TEMPERATURE: 98.4 F

## 2019-12-03 DIAGNOSIS — J01.10 ACUTE FRONTAL SINUSITIS, RECURRENCE NOT SPECIFIED: ICD-10-CM

## 2019-12-03 DIAGNOSIS — Z12.31 SCREENING MAMMOGRAM, ENCOUNTER FOR: ICD-10-CM

## 2019-12-03 DIAGNOSIS — G62.9 NEUROPATHY: ICD-10-CM

## 2019-12-03 DIAGNOSIS — J45.20 MILD INTERMITTENT ASTHMA WITHOUT COMPLICATION: ICD-10-CM

## 2019-12-03 DIAGNOSIS — Z98.84 S/P GASTRIC BYPASS: ICD-10-CM

## 2019-12-03 DIAGNOSIS — Z00.00 PREVENTATIVE HEALTH CARE: Primary | ICD-10-CM

## 2019-12-03 DIAGNOSIS — Z98.890 S/P BIOPSY: ICD-10-CM

## 2019-12-03 DIAGNOSIS — D69.6 THROMBOCYTOPENIA (H): ICD-10-CM

## 2019-12-03 LAB
ALBUMIN SERPL-MCNC: 3.5 G/DL (ref 3.4–5)
ALP SERPL-CCNC: 96 U/L (ref 40–150)
ALT SERPL W P-5'-P-CCNC: 27 U/L (ref 0–50)
ANION GAP SERPL CALCULATED.3IONS-SCNC: 6 MMOL/L (ref 3–14)
AST SERPL W P-5'-P-CCNC: 21 U/L (ref 0–45)
BILIRUB SERPL-MCNC: 0.9 MG/DL (ref 0.2–1.3)
BUN SERPL-MCNC: 12 MG/DL (ref 7–30)
CALCIUM SERPL-MCNC: 8.8 MG/DL (ref 8.5–10.1)
CHLORIDE SERPL-SCNC: 106 MMOL/L (ref 94–109)
CHOLEST SERPL-MCNC: 166 MG/DL
CO2 SERPL-SCNC: 28 MMOL/L (ref 20–32)
CREAT SERPL-MCNC: 0.87 MG/DL (ref 0.52–1.04)
ERYTHROCYTE [DISTWIDTH] IN BLOOD BY AUTOMATED COUNT: 12.6 % (ref 10–15)
FERRITIN SERPL-MCNC: 162 NG/ML (ref 8–252)
GFR SERPL CREATININE-BSD FRML MDRD: 74 ML/MIN/{1.73_M2}
GLUCOSE SERPL-MCNC: 94 MG/DL (ref 70–99)
HCT VFR BLD AUTO: 41.7 % (ref 35–47)
HDLC SERPL-MCNC: 61 MG/DL
HGB BLD-MCNC: 13.9 G/DL (ref 11.7–15.7)
IRON SATN MFR SERPL: 44 % (ref 15–46)
IRON SERPL-MCNC: 130 UG/DL (ref 35–180)
LDLC SERPL CALC-MCNC: 90 MG/DL
MAGNESIUM SERPL-MCNC: 2.1 MG/DL (ref 1.6–2.3)
MCH RBC QN AUTO: 30.1 PG (ref 26.5–33)
MCHC RBC AUTO-ENTMCNC: 33.3 G/DL (ref 31.5–36.5)
MCV RBC AUTO: 90 FL (ref 78–100)
NONHDLC SERPL-MCNC: 105 MG/DL
PLATELET # BLD AUTO: 114 10E9/L (ref 150–450)
POTASSIUM SERPL-SCNC: 4.1 MMOL/L (ref 3.4–5.3)
PROT SERPL-MCNC: 6.8 G/DL (ref 6.8–8.8)
RBC # BLD AUTO: 4.62 10E12/L (ref 3.8–5.2)
SODIUM SERPL-SCNC: 140 MMOL/L (ref 133–144)
TIBC SERPL-MCNC: 295 UG/DL (ref 240–430)
TRIGL SERPL-MCNC: 76 MG/DL
TSH SERPL DL<=0.005 MIU/L-ACNC: 2.2 MU/L (ref 0.4–4)
VIT B12 SERPL-MCNC: 479 PG/ML (ref 193–986)
WBC # BLD AUTO: 4.3 10E9/L (ref 4–11)

## 2019-12-03 PROCEDURE — 99396 PREV VISIT EST AGE 40-64: CPT | Performed by: INTERNAL MEDICINE

## 2019-12-03 PROCEDURE — 83735 ASSAY OF MAGNESIUM: CPT | Performed by: INTERNAL MEDICINE

## 2019-12-03 PROCEDURE — 82525 ASSAY OF COPPER: CPT | Mod: 90 | Performed by: INTERNAL MEDICINE

## 2019-12-03 PROCEDURE — 82746 ASSAY OF FOLIC ACID SERUM: CPT | Performed by: INTERNAL MEDICINE

## 2019-12-03 PROCEDURE — 84630 ASSAY OF ZINC: CPT | Mod: 90 | Performed by: INTERNAL MEDICINE

## 2019-12-03 PROCEDURE — 77063 BREAST TOMOSYNTHESIS BI: CPT

## 2019-12-03 PROCEDURE — 80061 LIPID PANEL: CPT | Performed by: INTERNAL MEDICINE

## 2019-12-03 PROCEDURE — 83540 ASSAY OF IRON: CPT | Performed by: INTERNAL MEDICINE

## 2019-12-03 PROCEDURE — 82607 VITAMIN B-12: CPT | Performed by: INTERNAL MEDICINE

## 2019-12-03 PROCEDURE — 99000 SPECIMEN HANDLING OFFICE-LAB: CPT | Performed by: INTERNAL MEDICINE

## 2019-12-03 PROCEDURE — 82728 ASSAY OF FERRITIN: CPT | Performed by: INTERNAL MEDICINE

## 2019-12-03 PROCEDURE — 82306 VITAMIN D 25 HYDROXY: CPT | Performed by: INTERNAL MEDICINE

## 2019-12-03 PROCEDURE — 84443 ASSAY THYROID STIM HORMONE: CPT | Performed by: INTERNAL MEDICINE

## 2019-12-03 PROCEDURE — 83550 IRON BINDING TEST: CPT | Performed by: INTERNAL MEDICINE

## 2019-12-03 PROCEDURE — 85027 COMPLETE CBC AUTOMATED: CPT | Performed by: INTERNAL MEDICINE

## 2019-12-03 PROCEDURE — 36415 COLL VENOUS BLD VENIPUNCTURE: CPT | Performed by: INTERNAL MEDICINE

## 2019-12-03 PROCEDURE — 80053 COMPREHEN METABOLIC PANEL: CPT | Performed by: INTERNAL MEDICINE

## 2019-12-03 RX ORDER — GABAPENTIN 100 MG/1
CAPSULE ORAL
Qty: 60 CAPSULE | Refills: 11 | Status: SHIPPED | OUTPATIENT
Start: 2019-12-03 | End: 2020-12-24

## 2019-12-03 RX ORDER — ALBUTEROL SULFATE 90 UG/1
2 AEROSOL, METERED RESPIRATORY (INHALATION) EVERY 4 HOURS PRN
Qty: 1 INHALER | Refills: 6 | Status: SHIPPED | OUTPATIENT
Start: 2019-12-03 | End: 2021-08-02

## 2019-12-03 RX ORDER — LEVOMEFOLATE/ALGAL OIL 15-90.314
CAPSULE ORAL
COMMUNITY
Start: 2019-12-03 | End: 2023-04-21

## 2019-12-03 RX ORDER — FLUTICASONE PROPIONATE 50 MCG
1 SPRAY, SUSPENSION (ML) NASAL DAILY
Qty: 16 G | Refills: 3 | Status: SHIPPED | OUTPATIENT
Start: 2019-12-03 | End: 2021-09-30

## 2019-12-03 RX ORDER — LIDOCAINE/PRILOCAINE 2.5 %-2.5%
CREAM (GRAM) TOPICAL EVERY 6 HOURS PRN
Qty: 70 G | Refills: 1 | Status: SHIPPED | OUTPATIENT
Start: 2019-12-03 | End: 2022-03-23

## 2019-12-03 ASSESSMENT — ENCOUNTER SYMPTOMS
FREQUENCY: 0
PALPITATIONS: 0
WEAKNESS: 0
ABDOMINAL PAIN: 0
NAUSEA: 0
DYSURIA: 0
HEMATURIA: 0
CHILLS: 0
JOINT SWELLING: 0
SORE THROAT: 0
HEMATOCHEZIA: 0
ARTHRALGIAS: 1
MYALGIAS: 1
HEADACHES: 1
BREAST MASS: 0
CONSTIPATION: 0
PARESTHESIAS: 0
COUGH: 0
HEARTBURN: 0
DIZZINESS: 0
SHORTNESS OF BREATH: 0
DIARRHEA: 0
NERVOUS/ANXIOUS: 0
EYE PAIN: 0
FEVER: 0

## 2019-12-03 ASSESSMENT — MIFFLIN-ST. JEOR: SCORE: 1558.85

## 2019-12-03 NOTE — PROGRESS NOTES
SUBJECTIVE:   CC: Shereen Oliver is an 54 year old woman who presents for preventive health visit.     Fasting.    Healthy Habits:     Getting at least 3 servings of Calcium per day:  Yes    Bi-annual eye exam:  Yes    Dental care twice a year:  Yes    Sleep apnea or symptoms of sleep apnea:  None and Sleep apnea    Diet:  Regular (no restrictions)    Frequency of exercise:  2-3 days/week    Duration of exercise:  30-45 minutes    Taking medications regularly:  Yes    Medication side effects:  None    PHQ-2 Total Score: 0    Additional concerns today:  No      Today's PHQ-2 Score:   PHQ-2 ( 1999 Pfizer) 12/3/2019   Q1: Little interest or pleasure in doing things 0   Q2: Feeling down, depressed or hopeless 0   PHQ-2 Score 0   Q1: Little interest or pleasure in doing things Not at all   Q2: Feeling down, depressed or hopeless Not at all   PHQ-2 Score 0       Abuse: Current or Past(Physical, Sexual or Emotional)- No  Do you feel safe in your environment? Yes        Social History     Tobacco Use     Smoking status: Never Smoker     Smokeless tobacco: Never Used   Substance Use Topics     Alcohol use: Yes     Comment: seldom     If you drink alcohol do you typically have >3 drinks per day or >7 drinks per week? No    Alcohol Use 12/3/2019   Prescreen: >3 drinks/day or >7 drinks/week? No   Prescreen: >3 drinks/day or >7 drinks/week? -       Reviewed orders with patient.  Reviewed health maintenance and updated orders accordingly - Yes  BP Readings from Last 3 Encounters:   12/03/19 108/72   10/03/19 98/60   09/15/19 122/64    Wt Readings from Last 3 Encounters:   12/03/19 93.6 kg (206 lb 6.4 oz)   10/03/19 91.7 kg (202 lb 3.2 oz)   09/15/19 92.1 kg (203 lb)                    Mammogram Screening: Patient over age 50, mutual decision to screen reflected in health maintenance.    Pertinent mammograms are reviewed under the imaging tab.  History of abnormal Pap smear: NO - age 30-65 PAP every 5 years with negative HPV  "co-testing recommended  PAP / HPV Latest Ref Rng & Units 3/13/2018 1/29/2014 11/1/2012   PAP - NIL NIL NIL   HPV 16 DNA NEG:Negative Negative - -   HPV 18 DNA NEG:Negative Negative - -   OTHER HR HPV NEG:Negative Negative - -     Reviewed and updated as needed this visit by clinical staff  Tobacco  Allergies  Med Hx  Surg Hx  Fam Hx  Soc Hx        Reviewed and updated as needed this visit by Provider            Review of Systems   Constitutional: Negative for chills and fever.   HENT: Negative for congestion, ear pain, hearing loss and sore throat.    Eyes: Negative for pain and visual disturbance.   Respiratory: Negative for cough and shortness of breath.    Cardiovascular: Negative for chest pain, palpitations and peripheral edema.   Gastrointestinal: Negative for abdominal pain, constipation, diarrhea, heartburn, hematochezia and nausea.   Breasts:  Negative for tenderness, breast mass and discharge.   Genitourinary: Negative for dysuria, frequency, genital sores, hematuria, pelvic pain, urgency, vaginal bleeding and vaginal discharge.   Musculoskeletal: Positive for arthralgias and myalgias. Negative for joint swelling.   Skin: Negative for rash.   Neurological: Positive for headaches. Negative for dizziness, weakness and paresthesias.   Psychiatric/Behavioral: Negative for mood changes. The patient is not nervous/anxious.         OBJECTIVE:   /72 (BP Location: Right arm, Patient Position: Chair, Cuff Size: Adult Large)   Pulse 66   Temp 98.4  F (36.9  C) (Oral)   Resp 16   Ht 1.702 m (5' 7\")   Wt 93.6 kg (206 lb 6.4 oz)   SpO2 96%   Breastfeeding No   BMI 32.33 kg/m    Physical Exam  GENERAL: healthy, alert and no distress  EYES: Eyes grossly normal to inspection, PERRL and conjunctivae and sclerae normal  HENT: ear canals and TM's normal, nose and mouth without ulcers or lesions  NECK: no adenopathy, no asymmetry, masses, or scars and thyroid normal to palpation  RESP: lungs clear to " auscultation - no rales, rhonchi or wheezes  CV: regular rate and rhythm, normal S1 S2, no S3 or S4, no murmur, click or rub, no peripheral edema and peripheral pulses strong  ABDOMEN: soft, nontender, no hepatosplenomegaly, no masses and bowel sounds normal  MS: no gross musculoskeletal defects noted, no edema  SKIN: no suspicious lesions or rashes  NEURO: Normal strength and tone, mentation intact and speech normal  PSYCH: mentation appears normal, affect normal/bright      ASSESSMENT/PLAN:   1. Preventative health care     - CBC with platelets  - TSH with free T4 reflex  - Comprehensive metabolic panel  - Lipid Profile (Chol, Trig, HDL, LDL calc)  - Ferritin  - Iron and iron binding capacity  - Magnesium  - Vitamin D Deficiency  - Vitamin B12  - Folate  - Copper level  - Zinc    2. Acute frontal sinusitis, recurrence not specified     - fluticasone (FLONASE) 50 MCG/ACT nasal spray; Spray 1 spray into both nostrils daily  Dispense: 16 g; Refill: 3    3. Neuropathy     - gabapentin (NEURONTIN) 100 MG capsule; 1-2 per day as needed for pain  Dispense: 60 capsule; Refill: 11    4. S/P biopsy     - lidocaine-prilocaine (EMLA) 2.5-2.5 % external cream; Apply topically every 6 hours as needed for moderate pain Profile Rx: patient will contact pharmacy when needed  Dispense: 70 g; Refill: 1    5. Mild intermittent asthma without complication     - albuterol (PROAIR HFA/PROVENTIL HFA/VENTOLIN HFA) 108 (90 Base) MCG/ACT inhaler; Inhale 2 puffs into the lungs every 4 hours as needed for shortness of breath / dyspnea  Dispense: 1 Inhaler; Refill: 6    6. Thrombocytopenia (H)       7. S/P gastric bypass     - Ferritin  - Iron and iron binding capacity  - Magnesium  - Vitamin D Deficiency  - Vitamin B12  - Folate  - Copper level  - Zinc    COUNSELING:  Reviewed preventive health counseling, as reflected in patient instructions       Regular exercise       Healthy diet/nutrition    Estimated body mass index is 32.33 kg/m  as  "calculated from the following:    Height as of this encounter: 1.702 m (5' 7\").    Weight as of this encounter: 93.6 kg (206 lb 6.4 oz).    Weight management plan: Discussed healthy diet and exercise guidelines     reports that she has never smoked. She has never used smokeless tobacco.      Counseling Resources:  ATP IV Guidelines  Pooled Cohorts Equation Calculator  Breast Cancer Risk Calculator  FRAX Risk Assessment  ICSI Preventive Guidelines  Dietary Guidelines for Americans, 2010  USDA's MyPlate  ASA Prophylaxis  Lung CA Screening    Jody Ribera MD  [facility]  "

## 2019-12-04 LAB
DEPRECATED CALCIDIOL+CALCIFEROL SERPL-MC: 30 UG/L (ref 20–75)
FOLATE SERPL-MCNC: >100 NG/ML

## 2019-12-04 ASSESSMENT — ASTHMA QUESTIONNAIRES: ACT_TOTALSCORE: 24

## 2019-12-05 ENCOUNTER — MYC MEDICAL ADVICE (OUTPATIENT)
Dept: INTERNAL MEDICINE | Facility: CLINIC | Age: 54
End: 2019-12-05

## 2019-12-05 LAB
COPPER SERPL-MCNC: 117.8 UG/DL (ref 80–155)
ZINC SERPL-MCNC: 60 UG/DL (ref 60–120)

## 2020-02-12 DIAGNOSIS — G62.9 NEUROPATHY: ICD-10-CM

## 2020-02-12 NOTE — TELEPHONE ENCOUNTER
Requested Prescriptions   Pending Prescriptions Disp Refills     gabapentin (NEURONTIN) 100 MG capsule 60 capsule 11     Si-2 per day as needed for pain       There is no refill protocol information for this order              Last Written Prescription Date:  19  Last Fill Quantity: 60,   # refills: 11  Last Office Visit: 19  Future Office visit:       Routing refill request to provider for review/approval because:  Drug not on the [facility] or [facility] refill protocol or controlled substance

## 2020-02-13 RX ORDER — GABAPENTIN 100 MG/1
CAPSULE ORAL
Qty: 60 CAPSULE | Refills: 11 | OUTPATIENT
Start: 2020-02-13

## 2020-06-23 ENCOUNTER — OFFICE VISIT (OUTPATIENT)
Dept: OBGYN | Facility: CLINIC | Age: 55
End: 2020-06-23
Payer: COMMERCIAL

## 2020-06-23 VITALS
SYSTOLIC BLOOD PRESSURE: 118 MMHG | HEIGHT: 67 IN | DIASTOLIC BLOOD PRESSURE: 78 MMHG | BODY MASS INDEX: 32.3 KG/M2 | WEIGHT: 205.8 LBS

## 2020-06-23 DIAGNOSIS — Z30.432 ENCOUNTER FOR IUD REMOVAL: ICD-10-CM

## 2020-06-23 DIAGNOSIS — N89.8 VAGINAL DISCHARGE: ICD-10-CM

## 2020-06-23 DIAGNOSIS — Z30.018 HORMONAL CONTRACEPTIVE: Primary | ICD-10-CM

## 2020-06-23 LAB
SPECIMEN SOURCE: NORMAL
WET PREP SPEC: NORMAL

## 2020-06-23 PROCEDURE — 87210 SMEAR WET MOUNT SALINE/INK: CPT | Performed by: FAMILY MEDICINE

## 2020-06-23 PROCEDURE — 58301 REMOVE INTRAUTERINE DEVICE: CPT | Performed by: FAMILY MEDICINE

## 2020-06-23 RX ORDER — ACETAMINOPHEN AND CODEINE PHOSPHATE 120; 12 MG/5ML; MG/5ML
0.35 SOLUTION ORAL SEE ADMIN INSTRUCTIONS
Qty: 30 TABLET | Refills: 1 | Status: SHIPPED | OUTPATIENT
Start: 2020-06-23 | End: 2020-12-21

## 2020-06-23 RX ORDER — HYDROCODONE BITARTRATE AND ACETAMINOPHEN 5; 325 MG/1; MG/1
1 TABLET ORAL EVERY 6 HOURS PRN
Qty: 4 TABLET | Refills: 0 | Status: SHIPPED | OUTPATIENT
Start: 2020-06-23 | End: 2020-06-26

## 2020-06-23 ASSESSMENT — MIFFLIN-ST. JEOR: SCORE: 1551.13

## 2020-06-23 NOTE — NURSING NOTE
"Chief Complaint   Patient presents with     IUD     remove IUD--discuss doing a birth control to taper off hormone       Initial /78   Ht 1.702 m (5' 7\")   Wt 93.4 kg (205 lb 12.8 oz)   BMI 32.23 kg/m   Estimated body mass index is 32.23 kg/m  as calculated from the following:    Height as of this encounter: 1.702 m (5' 7\").    Weight as of this encounter: 93.4 kg (205 lb 12.8 oz).  BP completed using cuff size: regular    Questioned patient about current smoking habits.  Pt. has never smoked.      No obstetric history on file.    The following HM Due: NONE           "

## 2020-06-23 NOTE — PATIENT INSTRUCTIONS
Micronor:  Daily x 4 days, then every other day x 4 days, then every 3rd day x 3 days, then stop.      Dr. Daina Rosado,     Obstetrics and Gynecology  [facility] [place]

## 2020-06-23 NOTE — PROGRESS NOTES
SUBJECTIVE:  Shereen Oliver is an 55 year old woman who presents for   Gyn follow-up exam for IUD removal.     Past Medical History:   Diagnosis Date     Agoraphobia with panic disorder     elevators and cars     Diabetes mellitus     type 2 resolved after gastric bypass     Disturbance of skin sensation     left leg as residual of neck surgery     Gastro-oesophageal reflux disease      Genital herpes, unspecified      Intrinsic asthma, unspecified     exercise induced     Obesity      Other psoriasis      Spasm of muscle     left leg as residual of neck surgery          Family History   Problem Relation Age of Onset     Gastrointestinal Disease Mother         gallbladder stones     Cerebrovascular Disease Mother      Hypertension Father      Cerebrovascular Disease Father      Family History Negative Brother      Diabetes Maternal Grandfather        Past Surgical History:   Procedure Laterality Date     BIOPSY VULVA Left 11/10/2015    Procedure: BIOPSY VULVA;  Surgeon: Diana Rosado DO;  Location: [facility] OR     C IUD,MIRENA  6/2010     C VILLA W/O FACETEC FORAMOT/DSKC 1/2 VRT SEG, CERVICAL  2001    fusion c4-5-6,herniated discs     CHOLECYSTECTOMY, LAPOROSCOPIC  2007    Cholecystectomy, Laparoscopic     COLONOSCOPY N/A 1/20/2017    Procedure: COLONOSCOPY;  Surgeon: Tank Peguero MD;  Location: [facility] GI     D & C  6/29/10    removal of polyp and placement of Mirena     DAVINCI BYPASS GASTRIC BRANT-EN-Y         Current Outpatient Medications   Medication     acyclovir (ZOVIRAX) 400 MG tablet     albuterol (PROAIR HFA/PROVENTIL HFA/VENTOLIN HFA) 108 (90 Base) MCG/ACT inhaler     fluticasone (FLONASE) 50 MCG/ACT nasal spray     gabapentin (NEURONTIN) 100 MG capsule     L-Methylfolate-Algae (DEPLIN 15) 15-90.314 MG CAPS     lidocaine-prilocaine (EMLA) 2.5-2.5 % external cream     omeprazole (PRILOSEC) 40 MG capsule     VITAMIN D, CHOLECALCIFEROL, PO     levonorgestrel (MIRENA) 20 MCG/24HR IUD     No current  "facility-administered medications for this visit.      Allergies   Allergen Reactions     No Known Drug Allergies        Social History     Tobacco Use     Smoking status: Never Smoker     Smokeless tobacco: Never Used   Substance Use Topics     Alcohol use: Yes     Comment: seldom       Review Of Systems  Ears/Nose/Throat: negative  Respiratory: No shortness of breath, dyspnea on exertion, cough, or hemoptysis  Cardiovascular: negative  Gastrointestinal: negative  Genitourinary: negative  Constitutional, HEENT, cardiovascular, pulmonary, GI, , musculoskeletal, neuro, skin, endocrine and psych systems are negative, except as otherwise noted.      OBJECTIVE:  /78   Ht 1.702 m (5' 7\")   Wt 93.4 kg (205 lb 12.8 oz)   BMI 32.23 kg/m    General appearance: healthy, alert and no distress  EYES EOMI, intact visual fields   HENT: Normocephalic.   NECK: no adenopathy, no asymmetry, masses, or scars   Lungs: negative, Percussion normal. Good diaphragmatic excursion. Lungs clear  Heart: negative, PMI normal. No lifts, heaves, or thrills. RRR. No murmurs, clicks gallops or rub  Abdomen: Abdomen soft, non-tender. BS normal. No masses, organomegaly  SKIN: no ulcers, lesions or rash  NEURO: alert/oriented to person, location and time, CN 2-12 intact, brisk, strength 5/5 throughout and symmetric, sensation to light touch grossly intact throughout  PSYCH: NEGATIVE  Pelvic: Pelvic examination with no pap/Gonorrhea and Chlamydia  External genitalia normal   and vagina normal rugatted not atrophic -  Examination of urethra showed normal no masses, tenderness, scarring  bladder, no masses or tenderness  Cervix no lesions or discharge  Bimanual exam deferred     Procedure:  Informed consent obtained.   Strings grasped with ring forceps and IUD easily removed, intact.   Patient tolerated well, no complications.    ASSESSMENT:  Shereen Oliver is an 55 year old woman who presents for   Gyn follow-up exam for IUD removal. "     PLAN:  Dx: IUD removal  1)  IUD removal  2)  Hormonal taper:  Micronor taper given   3)  Vaginal discharge: wet prep pending    Dr. Diana Rosado,     OB/GYN   [facility]

## 2020-09-29 DIAGNOSIS — R21 RASH: ICD-10-CM

## 2020-09-29 RX ORDER — ACYCLOVIR 400 MG/1
400 TABLET ORAL 3 TIMES DAILY
Qty: 21 TABLET | Refills: 5 | Status: SHIPPED | OUTPATIENT
Start: 2020-09-29 | End: 2022-03-23

## 2020-09-29 NOTE — TELEPHONE ENCOUNTER
"Requested Prescriptions   Pending Prescriptions Disp Refills     acyclovir (ZOVIRAX) 400 MG tablet  Last Written Prescription Date:  03/26/19  Last Fill Quantity: 21,  # refills: 5   Last office visit: 12/3/2019 with prescribing provider:  12/03/19   Future Office Visit:           21 tablet 5     Sig: Take 1 tablet (400 mg) by mouth 3 times daily       Antivirals for Herpes Protocol Passed - 9/29/2020  8:19 AM        Passed - Patient is age 12 or older        Passed - Recent (12 mo) or future (30 days) visit within the authorizing provider's specialty     Patient has had an office visit with the authorizing provider or a provider within the authorizing providers department within the previous 12 mos or has a future within next 30 days. See \"Patient Info\" tab in inbasket, or \"Choose Columns\" in Meds & Orders section of the refill encounter.              Passed - Medication is active on med list        Passed - Normal serum creatinine on file in past 12 months     Recent Labs   Lab Test 12/03/19  0805   CR 0.87       Ok to refill medication if creatinine is low               "

## 2020-11-16 ENCOUNTER — HEALTH MAINTENANCE LETTER (OUTPATIENT)
Age: 55
End: 2020-11-16

## 2020-12-17 ASSESSMENT — ENCOUNTER SYMPTOMS
ARTHRALGIAS: 1
BREAST MASS: 0

## 2020-12-21 ENCOUNTER — OFFICE VISIT (OUTPATIENT)
Dept: INTERNAL MEDICINE | Facility: CLINIC | Age: 55
End: 2020-12-21
Payer: COMMERCIAL

## 2020-12-21 VITALS
SYSTOLIC BLOOD PRESSURE: 124 MMHG | OXYGEN SATURATION: 98 % | TEMPERATURE: 98.4 F | HEART RATE: 60 BPM | BODY MASS INDEX: 30.92 KG/M2 | WEIGHT: 204 LBS | HEIGHT: 68 IN | DIASTOLIC BLOOD PRESSURE: 77 MMHG | RESPIRATION RATE: 20 BRPM

## 2020-12-21 DIAGNOSIS — Z00.00 PREVENTATIVE HEALTH CARE: Primary | ICD-10-CM

## 2020-12-21 DIAGNOSIS — G95.89 MYELOMALACIA OF CERVICAL CORD (H): ICD-10-CM

## 2020-12-21 DIAGNOSIS — G62.9 NEUROPATHY: ICD-10-CM

## 2020-12-21 LAB
ALBUMIN SERPL-MCNC: 3.6 G/DL (ref 3.4–5)
ALP SERPL-CCNC: 112 U/L (ref 40–150)
ALT SERPL W P-5'-P-CCNC: 37 U/L (ref 0–50)
ANION GAP SERPL CALCULATED.3IONS-SCNC: 3 MMOL/L (ref 3–14)
AST SERPL W P-5'-P-CCNC: 29 U/L (ref 0–45)
BASOPHILS # BLD AUTO: 0 10E9/L (ref 0–0.2)
BASOPHILS NFR BLD AUTO: 0.6 %
BILIRUB SERPL-MCNC: 0.7 MG/DL (ref 0.2–1.3)
BUN SERPL-MCNC: 8 MG/DL (ref 7–30)
CALCIUM SERPL-MCNC: 8.8 MG/DL (ref 8.5–10.1)
CHLORIDE SERPL-SCNC: 106 MMOL/L (ref 94–109)
CHOLEST SERPL-MCNC: 203 MG/DL
CO2 SERPL-SCNC: 30 MMOL/L (ref 20–32)
CREAT SERPL-MCNC: 0.89 MG/DL (ref 0.52–1.04)
DIFFERENTIAL METHOD BLD: ABNORMAL
EOSINOPHIL # BLD AUTO: 0.1 10E9/L (ref 0–0.7)
EOSINOPHIL NFR BLD AUTO: 2.8 %
ERYTHROCYTE [DISTWIDTH] IN BLOOD BY AUTOMATED COUNT: 12.6 % (ref 10–15)
GFR SERPL CREATININE-BSD FRML MDRD: 71 ML/MIN/{1.73_M2}
GLUCOSE SERPL-MCNC: 92 MG/DL (ref 70–99)
HCT VFR BLD AUTO: 42.7 % (ref 35–47)
HDLC SERPL-MCNC: 70 MG/DL
HGB BLD-MCNC: 14 G/DL (ref 11.7–15.7)
LDLC SERPL CALC-MCNC: 117 MG/DL
LYMPHOCYTES # BLD AUTO: 1.1 10E9/L (ref 0.8–5.3)
LYMPHOCYTES NFR BLD AUTO: 23.1 %
MCH RBC QN AUTO: 30 PG (ref 26.5–33)
MCHC RBC AUTO-ENTMCNC: 32.8 G/DL (ref 31.5–36.5)
MCV RBC AUTO: 91 FL (ref 78–100)
MONOCYTES # BLD AUTO: 0.5 10E9/L (ref 0–1.3)
MONOCYTES NFR BLD AUTO: 11.4 %
NEUTROPHILS # BLD AUTO: 2.9 10E9/L (ref 1.6–8.3)
NEUTROPHILS NFR BLD AUTO: 62.1 %
NONHDLC SERPL-MCNC: 133 MG/DL
PLATELET # BLD AUTO: 124 10E9/L (ref 150–450)
POTASSIUM SERPL-SCNC: 4 MMOL/L (ref 3.4–5.3)
PROT SERPL-MCNC: 7.1 G/DL (ref 6.8–8.8)
RBC # BLD AUTO: 4.67 10E12/L (ref 3.8–5.2)
SODIUM SERPL-SCNC: 139 MMOL/L (ref 133–144)
TRIGL SERPL-MCNC: 81 MG/DL
TSH SERPL DL<=0.005 MIU/L-ACNC: 1.51 MU/L (ref 0.4–4)
WBC # BLD AUTO: 4.6 10E9/L (ref 4–11)

## 2020-12-21 PROCEDURE — 99396 PREV VISIT EST AGE 40-64: CPT | Performed by: INTERNAL MEDICINE

## 2020-12-21 PROCEDURE — 80061 LIPID PANEL: CPT | Performed by: INTERNAL MEDICINE

## 2020-12-21 PROCEDURE — 80050 GENERAL HEALTH PANEL: CPT | Performed by: INTERNAL MEDICINE

## 2020-12-21 PROCEDURE — 36415 COLL VENOUS BLD VENIPUNCTURE: CPT | Performed by: INTERNAL MEDICINE

## 2020-12-21 RX ORDER — ZINC OXIDE 13 %
CREAM (GRAM) TOPICAL
COMMUNITY
Start: 2020-12-21 | End: 2023-04-21

## 2020-12-21 RX ORDER — ZINC GLUCONATE 50 MG
50 TABLET ORAL DAILY
COMMUNITY
Start: 2020-12-21 | End: 2022-03-23

## 2020-12-21 RX ORDER — PEDI MULTIVIT NO.25/FOLIC ACID 300 MCG
1 TABLET,CHEWABLE ORAL DAILY
COMMUNITY
Start: 2020-12-21 | End: 2023-04-21

## 2020-12-21 RX ORDER — LANOLIN ALCOHOL/MO/W.PET/CERES
1000 CREAM (GRAM) TOPICAL DAILY
COMMUNITY
Start: 2020-12-21 | End: 2023-04-21

## 2020-12-21 RX ORDER — METAPROTERENOL SULFATE 20 MG
TABLET ORAL
COMMUNITY
Start: 2020-12-21 | End: 2023-04-21

## 2020-12-21 RX ORDER — MULTIVIT-MIN/IRON/FOLIC ACID/K 18-600-40
CAPSULE ORAL
COMMUNITY
Start: 2020-12-21 | End: 2023-04-21

## 2020-12-21 ASSESSMENT — ENCOUNTER SYMPTOMS
ARTHRALGIAS: 1
BREAST MASS: 0

## 2020-12-21 ASSESSMENT — MIFFLIN-ST. JEOR: SCORE: 1550.9

## 2020-12-21 NOTE — PROGRESS NOTES
SUBJECTIVE:   CC: Shereen Oliver is an 55 year old woman who presents for preventive health visit.     Fasting.      Patient has been advised of split billing requirements and indicates understanding: Yes     Healthy Habits:     Getting at least 3 servings of Calcium per day:  Yes    Bi-annual eye exam:  Yes    Dental care twice a year:  Yes    Sleep apnea or symptoms of sleep apnea:  None    Diet:  Regular (no restrictions)    Frequency of exercise:  2-3 days/week    Duration of exercise:  Greater than 60 minutes    Taking medications regularly:  Yes    Medication side effects:  None    PHQ-2 Total Score: 0    Additional concerns today:  Yes      Today's PHQ-2 Score:   PHQ-2 ( 1999 Pfizer) 12/17/2020   Q1: Little interest or pleasure in doing things 0   Q2: Feeling down, depressed or hopeless 0   PHQ-2 Score 0   Q1: Little interest or pleasure in doing things Not at all   Q2: Feeling down, depressed or hopeless Not at all   PHQ-2 Score 0       Abuse: Current or Past (Physical, Sexual or Emotional) - No  Do you feel safe in your environment? Yes    Have you ever done Advance Care Planning? (For example, a Health Directive, POLST, or a discussion with a medical provider or your loved ones about your wishes): Yes, advance care planning is on file.    Social History     Tobacco Use     Smoking status: Never Smoker     Smokeless tobacco: Never Used   Substance Use Topics     Alcohol use: Yes     Comment: seldom     If you drink alcohol do you typically have >3 drinks per day or >7 drinks per week? No    Alcohol Use 12/17/2020   Prescreen: >3 drinks/day or >7 drinks/week? No   Prescreen: >3 drinks/day or >7 drinks/week? -     Reviewed orders with patient.  Reviewed health maintenance and updated orders accordingly - Yes  BP Readings from Last 3 Encounters:   12/21/20 124/77   06/23/20 118/78   12/03/19 108/72    Wt Readings from Last 3 Encounters:   12/21/20 92.5 kg (204 lb)   06/23/20 93.4 kg (205 lb 12.8 oz)    12/03/19 93.6 kg (206 lb 6.4 oz)                    Mammogram Screening: Patient over age 50, mutual decision to screen reflected in health maintenance.    Pertinent mammograms are reviewed under the imaging tab.  History of abnormal Pap smear: NO - age 30-65 PAP every 5 years with negative HPV co-testing recommended  PAP / HPV Latest Ref Rng & Units 3/13/2018 1/29/2014 11/1/2012   PAP - NIL NIL NIL   HPV 16 DNA NEG:Negative Negative - -   HPV 18 DNA NEG:Negative Negative - -   OTHER HR HPV NEG:Negative Negative - -     Reviewed and updated as needed this visit by clinical staff                 Reviewed and updated as needed this visit by Provider                    Review of Systems   Breasts:  Negative for tenderness, breast mass and discharge.   Genitourinary: Negative for pelvic pain, vaginal bleeding and vaginal discharge.   Musculoskeletal: Positive for arthralgias.     CONSTITUTIONAL: NEGATIVE for fever, chills, change in weight  INTEGUMENTARY/SKIN: NEGATIVE for worrisome rashes, moles or lesions  EYES: NEGATIVE for vision changes or irritation  ENT: NEGATIVE for ear, mouth and throat problems  RESP: NEGATIVE for significant cough or SOB  BREAST: NEGATIVE for masses, tenderness or discharge  CV: NEGATIVE for chest pain, palpitations or peripheral edema  GI: NEGATIVE for nausea, abdominal pain, heartburn, or change in bowel habits  : NEGATIVE for unusual urinary or vaginal symptoms. No vaginal bleeding.  NEURO: NEGATIVE for weakness, dizziness or paresthesias  PSYCHIATRIC: NEGATIVE for changes in mood or affect      OBJECTIVE:   There were no vitals taken for this visit.  Physical Exam  GENERAL: healthy, alert and no distress  EYES: Eyes grossly normal to inspection, PERRL and conjunctivae and sclerae normal  NECK: no adenopathy, no asymmetry, masses, or scars and thyroid normal to palpation  RESP: lungs clear to auscultation - no rales, rhonchi or wheezes  CV: regular rate and rhythm, normal S1 S2, no S3  "or S4, no murmur, click or rub, no peripheral edema and peripheral pulses strong  ABDOMEN: soft, nontender, no hepatosplenomegaly, no masses and bowel sounds normal  MS: no gross musculoskeletal defects noted, no edema  SKIN: no suspicious lesions or rashes  NEURO: Normal strength and tone, mentation intact and speech normal  PSYCH: mentation appears normal, affect normal/bright      ASSESSMENT/PLAN:   1. Preventative health care     - CBC with platelets and differential  - TSH with free T4 reflex  - Lipid Profile (Chol, Trig, HDL, LDL calc)  - Comprehensive metabolic panel (BMP + Alb, Alk Phos, ALT, AST, Total. Bili, TP)    2. Neuropathy  stable    3. Myelomalacia of cervical cord (H)  stable      Patient has been advised of split billing requirements and indicates understanding: Yes  COUNSELING:  Reviewed preventive health counseling, as reflected in patient instructions       Regular exercise       Healthy diet/nutrition    Estimated body mass index is 32.23 kg/m  as calculated from the following:    Height as of 6/23/20: 1.702 m (5' 7\").    Weight as of 6/23/20: 93.4 kg (205 lb 12.8 oz).    Weight management plan: Discussed healthy diet and exercise guidelines    She reports that she has never smoked. She has never used smokeless tobacco.      Counseling Resources:  ATP IV Guidelines  Pooled Cohorts Equation Calculator  Breast Cancer Risk Calculator  BRCA-Related Cancer Risk Assessment: FHS-7 Tool  FRAX Risk Assessment  ICSI Preventive Guidelines  Dietary Guidelines for Americans, 2010  USDA's MyPlate  ASA Prophylaxis  Lung CA Screening    Jody Ribera MD  [facility]  "

## 2020-12-22 ENCOUNTER — HOSPITAL ENCOUNTER (OUTPATIENT)
Dept: MAMMOGRAPHY | Facility: CLINIC | Age: 55
Discharge: HOME | End: 2020-12-22
Attending: INTERNAL MEDICINE | Admitting: INTERNAL MEDICINE
Payer: COMMERCIAL

## 2020-12-22 DIAGNOSIS — Z12.31 SCREENING MAMMOGRAM, ENCOUNTER FOR: ICD-10-CM

## 2020-12-22 PROCEDURE — 77067 SCR MAMMO BI INCL CAD: CPT

## 2020-12-22 ASSESSMENT — ASTHMA QUESTIONNAIRES: ACT_TOTALSCORE: 24

## 2020-12-24 DIAGNOSIS — G62.9 NEUROPATHY: ICD-10-CM

## 2020-12-24 RX ORDER — GABAPENTIN 100 MG/1
CAPSULE ORAL
Qty: 60 CAPSULE | Refills: 11 | Status: SHIPPED | OUTPATIENT
Start: 2020-12-24 | End: 2021-12-29

## 2020-12-24 NOTE — TELEPHONE ENCOUNTER
Gabapentin      Last Written Prescription Date:  12/03/19  Last Fill Quantity: 60,   # refills: 11  Last Office Visit: 12/21/20  Future Office visit:       Routing refill request to provider for review/approval because:  Drug not on the [facility] or [facility] refill protocol or controlled substance  ]

## 2021-01-18 ENCOUNTER — OFFICE VISIT (OUTPATIENT)
Dept: ORTHOPEDICS | Facility: CLINIC | Age: 56
End: 2021-01-18
Payer: COMMERCIAL

## 2021-01-18 ENCOUNTER — ANCILLARY PROCEDURE (OUTPATIENT)
Dept: GENERAL RADIOLOGY | Facility: CLINIC | Age: 56
End: 2021-01-18
Attending: FAMILY MEDICINE
Payer: COMMERCIAL

## 2021-01-18 VITALS
BODY MASS INDEX: 30.92 KG/M2 | HEIGHT: 68 IN | WEIGHT: 204 LBS | DIASTOLIC BLOOD PRESSURE: 74 MMHG | SYSTOLIC BLOOD PRESSURE: 114 MMHG

## 2021-01-18 DIAGNOSIS — M17.0 PRIMARY OSTEOARTHRITIS OF BOTH KNEES: ICD-10-CM

## 2021-01-18 DIAGNOSIS — M25.561 ACUTE PAIN OF BOTH KNEES: ICD-10-CM

## 2021-01-18 DIAGNOSIS — M25.561 ACUTE PAIN OF BOTH KNEES: Primary | ICD-10-CM

## 2021-01-18 DIAGNOSIS — M25.562 ACUTE PAIN OF BOTH KNEES: ICD-10-CM

## 2021-01-18 DIAGNOSIS — E66.09 NON MORBID OBESITY DUE TO EXCESS CALORIES: ICD-10-CM

## 2021-01-18 DIAGNOSIS — M25.562 ACUTE PAIN OF BOTH KNEES: Primary | ICD-10-CM

## 2021-01-18 PROCEDURE — 99204 OFFICE O/P NEW MOD 45 MIN: CPT | Performed by: FAMILY MEDICINE

## 2021-01-18 PROCEDURE — 73562 X-RAY EXAM OF KNEE 3: CPT | Mod: RT | Performed by: RADIOLOGY

## 2021-01-18 ASSESSMENT — MIFFLIN-ST. JEOR: SCORE: 1550.9

## 2021-01-18 NOTE — PROGRESS NOTES
"ASSESSMENT & PLAN    1. Acute pain of both knees    2. Primary osteoarthritis of both knees      Pain related to underlying arthritis, mostly behind your kneecaps  Reviewed xrays - mild arthritis  Continue to stay active as tolerated - arthritis likes motion  Start taking Turmeric. Nordic Naturals (Curcumin gummies) or Life Extensions (Curcumin Elite)  Use Tylenol for pain  Rx for Voltaren gel  Return anytime for cortisone injection  Consider removing sugars and processed foods    -----    SUBJECTIVE  Shereen Oliver is a/an 55 year old female who is seen as a self referral for evaluation of bilateral knee pain. The patient is seen by themselves.    Onset: Right knee - 3 month(s) ago, Left knee - 2 months. Reports insidious onset without acute precipitating event.  Location of Pain: right anterolateral knee, left anterior knee  Rating of Pain at worst: right knee 9/10, left knee 4/10  Rating of Pain Currently: right knee 0/10, left knee 1/10  Worsened by: right knee - kneeling, sitting at the end of the day  Better with: heat  Treatments tried: rest/activity avoidance, heat and Tylenol PRN  Associated symptoms: sharp pain in right knee, aching in left knee  Orthopedic history: NO  Relevant surgical history: NO  Patient Social History: works as an , enjoys cycling    Patient's past medical, surgical, social, and family histories were reviewed today and no pertinent history related to patient's presenting problem.    REVIEW OF SYSTEMS:  10 point ROS is negative other than symptoms noted above in HPI, Past Medical History or as stated below  Constitutional: NEGATIVE for fever, chills, change in weight  Skin: NEGATIVE for worrisome rashes, moles or lesions  GI/: NEGATIVE for bowel or bladder changes  Neuro: NEGATIVE for weakness, dizziness or paresthesias    OBJECTIVE:  /74   Ht 1.715 m (5' 7.5\")   Wt 92.5 kg (204 lb)   BMI 31.48 kg/m     General: healthy, alert and in no distress  HEENT: no " scleral icterus or conjunctival erythema  Skin: no suspicious lesions or rash. No jaundice.  CV: no pedal edema  Resp: normal respiratory effort without conversational dyspnea   Psych: normal mood and affect  Gait: normal steady gait with appropriate coordination and balance  Neuro: Normal light sensory exam of lower extremity  MSK:  BILATERAL KNEE  Inspection:    normal alignment  Palpation:    Tender about the lateral patellar facet and medial patellar facet. Remainder of bony and ligamentous landmarks are nontender.    No effusion is present    Patellofemoral crepitus is Present  Range of Motion:     00 extension to 1350 flexion  Strength:    Extensor mechanism intact  Special Tests:    Positive: Patellar grind    Negative: MCL/valgus stress (0 & 30 deg), LCL/varus stress (0 & 30 deg), Lachman's, Carlos's    Independent visualization of the below image:  Recent Results (from the past 24 hour(s))   XR Knee Bilateral 3 vw    Narrative    XR KNEE BILATERAL 3 VW  1/18/2021 7:38 AM     HISTORY: Acute pain of both knees; Acute pain of both knees    COMPARISON: None.      Impression    IMPRESSION:      Right: Minimal tricompartmental hypertrophic change. No joint space  narrowing. Normal patellar alignment. Trace knee joint effusion.    Left: Minimal tricompartmental hypertrophic change. No joint space  narrowing. Normal patellar alignment. Trace knee joint effusion.     MD Damaris SY,  [facility]

## 2021-01-18 NOTE — LETTER
1/18/2021         RE: Shereen Oliver  [address]  [facility] [address]        Dear Colleague,    Thank you for referring your patient, Shereen Oliver, to the [facility] [place]. Please see a copy of my visit note below.    ASSESSMENT & PLAN    1. Acute pain of both knees    2. Primary osteoarthritis of both knees      Pain related to underlying arthritis, mostly behind your kneecaps  Reviewed xrays - mild arthritis  Continue to stay active as tolerated - arthritis likes motion  Start taking Turmeric. Nordic Naturals (Curcumin gummies) or Life Extensions (Curcumin Elite)  Use Tylenol for pain  Rx for Voltaren gel  Return anytime for cortisone injection  Consider removing sugars and processed foods    -----    SUBJECTIVE  Shereen Oliver is a/an 55 year old female who is seen as a self referral for evaluation of bilateral knee pain. The patient is seen by themselves.    Onset: Right knee - 3 month(s) ago, Left knee - 2 months. Reports insidious onset without acute precipitating event.  Location of Pain: right anterolateral knee, left anterior knee  Rating of Pain at worst: right knee 9/10, left knee 4/10  Rating of Pain Currently: right knee 0/10, left knee 1/10  Worsened by: right knee - kneeling, sitting at the end of the day  Better with: heat  Treatments tried: rest/activity avoidance, heat and Tylenol PRN  Associated symptoms: sharp pain in right knee, aching in left knee  Orthopedic history: NO  Relevant surgical history: NO  Patient Social History: works as an , enjoys cycling    Patient's past medical, surgical, social, and family histories were reviewed today and no pertinent history related to patient's presenting problem.    REVIEW OF SYSTEMS:  10 point ROS is negative other than symptoms noted above in HPI, Past Medical History or as stated below  Constitutional: NEGATIVE for fever, chills, change in weight  Skin: NEGATIVE for worrisome  "rashes, moles or lesions  GI/: NEGATIVE for bowel or bladder changes  Neuro: NEGATIVE for weakness, dizziness or paresthesias    OBJECTIVE:  /74   Ht 1.715 m (5' 7.5\")   Wt 92.5 kg (204 lb)   BMI 31.48 kg/m     General: healthy, alert and in no distress  HEENT: no scleral icterus or conjunctival erythema  Skin: no suspicious lesions or rash. No jaundice.  CV: no pedal edema  Resp: normal respiratory effort without conversational dyspnea   Psych: normal mood and affect  Gait: normal steady gait with appropriate coordination and balance  Neuro: Normal light sensory exam of lower extremity  MSK:  BILATERAL KNEE  Inspection:    normal alignment  Palpation:    Tender about the lateral patellar facet and medial patellar facet. Remainder of bony and ligamentous landmarks are nontender.    No effusion is present    Patellofemoral crepitus is Present  Range of Motion:     00 extension to 1350 flexion  Strength:    Extensor mechanism intact  Special Tests:    Positive: Patellar grind    Negative: MCL/valgus stress (0 & 30 deg), LCL/varus stress (0 & 30 deg), Lachman's, Carlos's    Independent visualization of the below image:  Recent Results (from the past 24 hour(s))   XR Knee Bilateral 3 vw    Narrative    XR KNEE BILATERAL 3 VW  1/18/2021 7:38 AM     HISTORY: Acute pain of both knees; Acute pain of both knees    COMPARISON: None.      Impression    IMPRESSION:      Right: Minimal tricompartmental hypertrophic change. No joint space  narrowing. Normal patellar alignment. Trace knee joint effusion.    Left: Minimal tricompartmental hypertrophic change. No joint space  narrowing. Normal patellar alignment. Trace knee joint effusion.     MD Damaris SY DO [facility]        Again, thank you for allowing me to participate in the care of your patient.        Sincerely,        Damaris Oquendo, DO    "

## 2021-01-18 NOTE — PATIENT INSTRUCTIONS
1. Acute pain of both knees    2. Primary osteoarthritis of both knees      Pain related to underlying arthritis, mostly behind your kneecaps  Reviewed xrays - mild arthritis  Continue to stay active as tolerated - arthritis likes motion  Start taking Turmeric. Nordic Naturals (Curcumin gummies) or Life Extensions (Curcumin Elite)  Use Tylenol for pain  Rx for Voltaren gel  Return anytime for cortisone injection  Consider removing sugars and processed foods

## 2021-03-25 ENCOUNTER — IMMUNIZATION (OUTPATIENT)
Dept: NURSING | Facility: CLINIC | Age: 56
End: 2021-03-25
Payer: COMMERCIAL

## 2021-03-25 PROCEDURE — 0031A PR COVID VAC JANSSEN AD26 0.5ML: CPT

## 2021-03-25 PROCEDURE — 91303 PR COVID VAC JANSSEN AD26 0.5ML: CPT

## 2021-08-02 DIAGNOSIS — J45.20 MILD INTERMITTENT ASTHMA WITHOUT COMPLICATION: ICD-10-CM

## 2021-08-03 ENCOUNTER — MYC MEDICAL ADVICE (OUTPATIENT)
Dept: INTERNAL MEDICINE | Facility: CLINIC | Age: 56
End: 2021-08-03

## 2021-08-03 NOTE — TELEPHONE ENCOUNTER
Routing refill request to provider for review/approval because:  Patient needs to be seen because:  due for appt    MyChart  msg sent to pt.

## 2021-08-04 RX ORDER — ALBUTEROL SULFATE 90 UG/1
2 AEROSOL, METERED RESPIRATORY (INHALATION) EVERY 4 HOURS PRN
Qty: 18 G | Refills: 4 | Status: SHIPPED | OUTPATIENT
Start: 2021-08-04 | End: 2022-03-23

## 2021-09-18 ENCOUNTER — HEALTH MAINTENANCE LETTER (OUTPATIENT)
Age: 56
End: 2021-09-18

## 2021-09-30 ENCOUNTER — ANCILLARY PROCEDURE (OUTPATIENT)
Dept: GENERAL RADIOLOGY | Facility: CLINIC | Age: 56
End: 2021-09-30
Attending: NURSE PRACTITIONER
Payer: COMMERCIAL

## 2021-09-30 ENCOUNTER — OFFICE VISIT (OUTPATIENT)
Dept: INTERNAL MEDICINE | Facility: CLINIC | Age: 56
End: 2021-09-30
Payer: COMMERCIAL

## 2021-09-30 VITALS
HEIGHT: 68 IN | SYSTOLIC BLOOD PRESSURE: 104 MMHG | DIASTOLIC BLOOD PRESSURE: 70 MMHG | OXYGEN SATURATION: 99 % | RESPIRATION RATE: 16 BRPM | WEIGHT: 206.2 LBS | BODY MASS INDEX: 31.25 KG/M2 | TEMPERATURE: 97.8 F | HEART RATE: 70 BPM

## 2021-09-30 DIAGNOSIS — J32.1 CHRONIC FRONTAL SINUSITIS: Primary | ICD-10-CM

## 2021-09-30 DIAGNOSIS — M79.675 PAIN OF TOE OF LEFT FOOT: ICD-10-CM

## 2021-09-30 DIAGNOSIS — H93.13 TINNITUS, BILATERAL: ICD-10-CM

## 2021-09-30 PROCEDURE — 99214 OFFICE O/P EST MOD 30 MIN: CPT | Performed by: NURSE PRACTITIONER

## 2021-09-30 PROCEDURE — 73630 X-RAY EXAM OF FOOT: CPT | Mod: LT | Performed by: RADIOLOGY

## 2021-09-30 RX ORDER — FLUTICASONE PROPIONATE 50 MCG
1 SPRAY, SUSPENSION (ML) NASAL 2 TIMES DAILY PRN
Qty: 16 G | Refills: 1 | Status: SHIPPED | OUTPATIENT
Start: 2021-09-30 | End: 2021-11-30

## 2021-09-30 ASSESSMENT — MIFFLIN-ST. JEOR: SCORE: 1555.88

## 2021-09-30 NOTE — NURSING NOTE
"left middle toe pain  bilateral ear ringing  Vital signs:  Temp: 97.8  F (36.6  C) Temp src: Tympanic BP: 104/70 Pulse: 70   Resp: 16 SpO2: 99 %     Height: 171.5 cm (5' 7.5\") Weight: 93.5 kg (206 lb 3.2 oz)  Estimated body mass index is 31.82 kg/m  as calculated from the following:    Height as of this encounter: 1.715 m (5' 7.5\").    Weight as of this encounter: 93.5 kg (206 lb 3.2 oz).          "

## 2021-09-30 NOTE — PATIENT INSTRUCTIONS
Go to radiology for x-ray of foot    Referral to Podiatry for evaluation of soft tissue on joint of left 3rd digit    Will treat chronic sinus issues with antibiotic and nasal spray (Augmentin twice daily and Flonase as directed); warm moist compresses to face    Referral to ENT for further evaluation on sinuses and ringing in ears.

## 2021-09-30 NOTE — PROGRESS NOTES
"    Assessment & Plan     Chronic frontal sinusitis  - restarted nasal spray as directed and given antibiotic to see if helps  - fluticasone (FLONASE) 50 MCG/ACT nasal spray; Spray 1 spray into both nostrils 2 times daily as needed for rhinitis or allergies  - amoxicillin-clavulanate (AUGMENTIN) 875-125 MG tablet; Take 1 tablet by mouth 2 times daily for 10 days  - Otolaryngology Referral; Future    Tinnitus, bilateral  - getting worse may be due to sinus issues  - fluticasone (FLONASE) 50 MCG/ACT nasal spray; Spray 1 spray into both nostrils 2 times daily as needed for rhinitis or allergies  - amoxicillin-clavulanate (AUGMENTIN) 875-125 MG tablet; Take 1 tablet by mouth 2 times daily for 10 days  - Otolaryngology Referral; Future    Pain of toe of left foot  - XR Foot Left G/E 3 Views; Future  - Orthopedic  Referral; Future  - use Ibuprofen or Tylenol for pain    Regarding request for Shingles vaccine, recommend hold off for now.  Also check with pharmacy to see if covered by insurance which she then can get at her pharmacy.    [id number]}     BMI:   Estimated body mass index is 31.82 kg/m  as calculated from the following:    Height as of this encounter: 1.715 m (5' 7.5\").    Weight as of this encounter: 93.5 kg (206 lb 3.2 oz).       Patient Instructions   Go to radiology for x-ray of foot    Referral to Podiatry for evaluation of soft tissue on joint of left 3rd digit    Will treat chronic sinus issues with antibiotic and nasal spray (Augmentin twice daily and Flonase as directed); warm moist compresses to face    Referral to ENT for further evaluation on sinuses and ringing in ears.      Return in about 3 weeks (around 10/21/2021), or if symptoms worsen or fail to improve.    Dinora Mercedes CNP  [facility] is a 56 year old who presents for the following health issues     HPI     Today's visit is for several 2 issues:  (1) bump on left foot 3rd toe; reports " "history of osteoarthritis in joints.  She reports she has numbness anyway down the left left from a nerve in the neck.  The toe joint gets painful and more numb than usual.  (2) having chronic sinus issues with head pressure and stuffiness that seems to be draining into my ears causing increased ringing.  No dizziness of room spinning at this time.  Ear pressure but no pain. Treated in past many times for my sinuses but nothing recently.    No fever or cough.  No shortness of breathe or chest pain.  No stomach issues.    Review of Systems   Constitutional, HEENT, cardiovascular, pulmonary, gi and gu systems are negative, except as otherwise noted.      Objective    /70   Pulse 70   Temp 97.8  F (36.6  C) (Tympanic)   Resp 16   Ht 1.715 m (5' 7.5\")   Wt 93.5 kg (206 lb 3.2 oz)   LMP  (LMP Unknown)   SpO2 99%   BMI 31.82 kg/m    Body mass index is 31.82 kg/m .     Physical Exam   GENERAL: alert and no distress  HENT: ear canals and TM's normal with right TM appears bulging slightly outward, nose swollen especially the left nostril; and mouth without ulcers or lesions but has post nasal drainage.  Frontal sinus pressure  NECK: no adenopathy, no asymmetry, masses, or scars and thyroid normal to palpation  RESP: lungs clear to auscultation - no rales, rhonchi or wheezes  CV: regular rate and rhythm, normal S1 S2, no S3 or S4, no murmur, click or rub, no peripheral edema and peripheral pulses strong  ABDOMEN: soft, nontender, no hepatosplenomegaly, no masses and bowel sounds normal  MS:  no edema; left foot 3rd digit DIP joint with hard nodule that is tender with pressure  SKIN: no suspicious lesions or rashes            "

## 2021-11-15 ENCOUNTER — MYC MEDICAL ADVICE (OUTPATIENT)
Dept: INTERNAL MEDICINE | Facility: CLINIC | Age: 56
End: 2021-11-15
Payer: COMMERCIAL

## 2021-11-27 DIAGNOSIS — H93.13 TINNITUS, BILATERAL: ICD-10-CM

## 2021-11-27 DIAGNOSIS — J32.1 CHRONIC FRONTAL SINUSITIS: ICD-10-CM

## 2021-11-30 RX ORDER — FLUTICASONE PROPIONATE 50 MCG
SPRAY, SUSPENSION (ML) NASAL
Qty: 16 G | Refills: 0 | Status: SHIPPED | OUTPATIENT
Start: 2021-11-30 | End: 2021-12-29

## 2021-12-27 DIAGNOSIS — G62.9 NEUROPATHY: ICD-10-CM

## 2021-12-29 RX ORDER — GABAPENTIN 100 MG/1
CAPSULE ORAL
Qty: 60 CAPSULE | Refills: 11 | Status: SHIPPED | OUTPATIENT
Start: 2021-12-29 | End: 2022-03-23

## 2021-12-29 NOTE — TELEPHONE ENCOUNTER
Routing refill request to provider for review/approval because:  Drug not on the [facility] refill protocol     Pending Prescriptions:                       Disp   Refills    gabapentin (NEURONTIN) 100 MG capsule      60 cap*11       Si-2 per day as needed for pain

## 2022-01-27 DIAGNOSIS — J32.1 CHRONIC FRONTAL SINUSITIS: ICD-10-CM

## 2022-01-27 DIAGNOSIS — H93.13 TINNITUS, BILATERAL: ICD-10-CM

## 2022-01-27 RX ORDER — FLUTICASONE PROPIONATE 50 MCG
SPRAY, SUSPENSION (ML) NASAL
Qty: 16 G | Refills: 0 | Status: SHIPPED | OUTPATIENT
Start: 2022-01-27 | End: 2022-02-24

## 2022-02-15 ENCOUNTER — TRANSFERRED RECORDS (OUTPATIENT)
Dept: HEALTH INFORMATION MANAGEMENT | Facility: CLINIC | Age: 57
End: 2022-02-15
Payer: COMMERCIAL

## 2022-02-23 ENCOUNTER — TRANSFERRED RECORDS (OUTPATIENT)
Dept: HEALTH INFORMATION MANAGEMENT | Facility: CLINIC | Age: 57
End: 2022-02-23
Payer: COMMERCIAL

## 2022-02-24 DIAGNOSIS — J32.1 CHRONIC FRONTAL SINUSITIS: ICD-10-CM

## 2022-02-24 DIAGNOSIS — H93.13 TINNITUS, BILATERAL: ICD-10-CM

## 2022-02-24 RX ORDER — FLUTICASONE PROPIONATE 50 MCG
SPRAY, SUSPENSION (ML) NASAL
Qty: 48 G | OUTPATIENT
Start: 2022-02-24

## 2022-03-05 ENCOUNTER — HEALTH MAINTENANCE LETTER (OUTPATIENT)
Age: 57
End: 2022-03-05

## 2022-03-23 ENCOUNTER — OFFICE VISIT (OUTPATIENT)
Dept: INTERNAL MEDICINE | Facility: CLINIC | Age: 57
End: 2022-03-23
Payer: COMMERCIAL

## 2022-03-23 ENCOUNTER — HOSPITAL ENCOUNTER (OUTPATIENT)
Dept: MAMMOGRAPHY | Facility: CLINIC | Age: 57
Discharge: HOME | End: 2022-03-23
Attending: INTERNAL MEDICINE | Admitting: INTERNAL MEDICINE
Payer: COMMERCIAL

## 2022-03-23 VITALS
OXYGEN SATURATION: 100 % | SYSTOLIC BLOOD PRESSURE: 113 MMHG | WEIGHT: 212.6 LBS | HEART RATE: 69 BPM | DIASTOLIC BLOOD PRESSURE: 75 MMHG | HEIGHT: 68 IN | TEMPERATURE: 99 F | BODY MASS INDEX: 32.22 KG/M2 | RESPIRATION RATE: 16 BRPM

## 2022-03-23 DIAGNOSIS — R21 RASH: ICD-10-CM

## 2022-03-23 DIAGNOSIS — Z11.4 SCREENING FOR HIV (HUMAN IMMUNODEFICIENCY VIRUS): ICD-10-CM

## 2022-03-23 DIAGNOSIS — Z00.00 PREVENTATIVE HEALTH CARE: Primary | ICD-10-CM

## 2022-03-23 DIAGNOSIS — G95.89 MYELOMALACIA OF CERVICAL CORD (H): ICD-10-CM

## 2022-03-23 DIAGNOSIS — Z11.59 NEED FOR HEPATITIS C SCREENING TEST: ICD-10-CM

## 2022-03-23 DIAGNOSIS — Z12.31 VISIT FOR SCREENING MAMMOGRAM: ICD-10-CM

## 2022-03-23 DIAGNOSIS — Z98.890 S/P BIOPSY: ICD-10-CM

## 2022-03-23 DIAGNOSIS — J45.20 MILD INTERMITTENT ASTHMA WITHOUT COMPLICATION: ICD-10-CM

## 2022-03-23 DIAGNOSIS — G62.9 NEUROPATHY: ICD-10-CM

## 2022-03-23 DIAGNOSIS — D69.6 THROMBOCYTOPENIA (H): ICD-10-CM

## 2022-03-23 LAB
ALBUMIN SERPL-MCNC: 3.5 G/DL (ref 3.4–5)
ALP SERPL-CCNC: 105 U/L (ref 40–150)
ALT SERPL W P-5'-P-CCNC: 27 U/L (ref 0–50)
ANION GAP SERPL CALCULATED.3IONS-SCNC: 6 MMOL/L (ref 3–14)
AST SERPL W P-5'-P-CCNC: 17 U/L (ref 0–45)
BASOPHILS # BLD AUTO: 0 10E3/UL (ref 0–0.2)
BASOPHILS NFR BLD AUTO: 0 %
BILIRUB SERPL-MCNC: 0.6 MG/DL (ref 0.2–1.3)
BUN SERPL-MCNC: 11 MG/DL (ref 7–30)
CALCIUM SERPL-MCNC: 9.8 MG/DL (ref 8.5–10.1)
CHLORIDE BLD-SCNC: 108 MMOL/L (ref 94–109)
CHOLEST SERPL-MCNC: 182 MG/DL
CO2 SERPL-SCNC: 27 MMOL/L (ref 20–32)
CREAT SERPL-MCNC: 0.87 MG/DL (ref 0.52–1.04)
EOSINOPHIL # BLD AUTO: 0.1 10E3/UL (ref 0–0.7)
EOSINOPHIL NFR BLD AUTO: 3 %
ERYTHROCYTE [DISTWIDTH] IN BLOOD BY AUTOMATED COUNT: 12.9 % (ref 10–15)
FASTING STATUS PATIENT QL REPORTED: YES
GFR SERPL CREATININE-BSD FRML MDRD: 76 ML/MIN/1.73M2
GLUCOSE BLD-MCNC: 103 MG/DL (ref 70–99)
HCT VFR BLD AUTO: 42.4 % (ref 35–47)
HCV AB SERPL QL IA: NONREACTIVE
HDLC SERPL-MCNC: 72 MG/DL
HGB BLD-MCNC: 14 G/DL (ref 11.7–15.7)
HIV 1+2 AB+HIV1 P24 AG SERPL QL IA: NONREACTIVE
LDLC SERPL CALC-MCNC: 95 MG/DL
LYMPHOCYTES # BLD AUTO: 0.9 10E3/UL (ref 0.8–5.3)
LYMPHOCYTES NFR BLD AUTO: 20 %
MCH RBC QN AUTO: 29.5 PG (ref 26.5–33)
MCHC RBC AUTO-ENTMCNC: 33 G/DL (ref 31.5–36.5)
MCV RBC AUTO: 90 FL (ref 78–100)
MONOCYTES # BLD AUTO: 0.5 10E3/UL (ref 0–1.3)
MONOCYTES NFR BLD AUTO: 11 %
NEUTROPHILS # BLD AUTO: 3.1 10E3/UL (ref 1.6–8.3)
NEUTROPHILS NFR BLD AUTO: 66 %
NONHDLC SERPL-MCNC: 110 MG/DL
PLATELET # BLD AUTO: 129 10E3/UL (ref 150–450)
POTASSIUM BLD-SCNC: 4.6 MMOL/L (ref 3.4–5.3)
PROT SERPL-MCNC: 7.4 G/DL (ref 6.8–8.8)
RBC # BLD AUTO: 4.74 10E6/UL (ref 3.8–5.2)
SODIUM SERPL-SCNC: 141 MMOL/L (ref 133–144)
TRIGL SERPL-MCNC: 73 MG/DL
TSH SERPL DL<=0.005 MIU/L-ACNC: 1.23 MU/L (ref 0.4–4)
WBC # BLD AUTO: 4.6 10E3/UL (ref 4–11)

## 2022-03-23 PROCEDURE — 86803 HEPATITIS C AB TEST: CPT | Performed by: INTERNAL MEDICINE

## 2022-03-23 PROCEDURE — 80061 LIPID PANEL: CPT | Performed by: INTERNAL MEDICINE

## 2022-03-23 PROCEDURE — 80050 GENERAL HEALTH PANEL: CPT | Performed by: INTERNAL MEDICINE

## 2022-03-23 PROCEDURE — 77067 SCR MAMMO BI INCL CAD: CPT

## 2022-03-23 PROCEDURE — 87389 HIV-1 AG W/HIV-1&-2 AB AG IA: CPT | Performed by: INTERNAL MEDICINE

## 2022-03-23 PROCEDURE — 99396 PREV VISIT EST AGE 40-64: CPT | Performed by: INTERNAL MEDICINE

## 2022-03-23 PROCEDURE — 36415 COLL VENOUS BLD VENIPUNCTURE: CPT | Performed by: INTERNAL MEDICINE

## 2022-03-23 RX ORDER — ZINC GLUCONATE 50 MG
50 TABLET ORAL DAILY
COMMUNITY
Start: 2022-03-23 | End: 2023-04-21

## 2022-03-23 RX ORDER — LIDOCAINE/PRILOCAINE 2.5 %-2.5%
CREAM (GRAM) TOPICAL EVERY 6 HOURS PRN
Qty: 70 G | Refills: 1 | Status: SHIPPED | OUTPATIENT
Start: 2022-03-23 | End: 2023-04-21

## 2022-03-23 RX ORDER — GABAPENTIN 100 MG/1
CAPSULE ORAL
Qty: 180 CAPSULE | Refills: 3 | Status: SHIPPED | OUTPATIENT
Start: 2022-03-23 | End: 2023-04-21

## 2022-03-23 RX ORDER — ACYCLOVIR 400 MG/1
400 TABLET ORAL 3 TIMES DAILY
Qty: 21 TABLET | Refills: 5 | Status: SHIPPED | OUTPATIENT
Start: 2022-03-23 | End: 2023-04-21

## 2022-03-23 RX ORDER — ALBUTEROL SULFATE 90 UG/1
2 AEROSOL, METERED RESPIRATORY (INHALATION) EVERY 4 HOURS PRN
Qty: 18 G | Refills: 4 | Status: SHIPPED | OUTPATIENT
Start: 2022-03-23 | End: 2023-04-21

## 2022-03-23 ASSESSMENT — ENCOUNTER SYMPTOMS
CHILLS: 0
SHORTNESS OF BREATH: 0
EYE PAIN: 0
HEADACHES: 0
MYALGIAS: 1
NERVOUS/ANXIOUS: 1
BREAST MASS: 0
CONSTIPATION: 0
PALPITATIONS: 0
NAUSEA: 0
COUGH: 0
SORE THROAT: 0
FEVER: 0
HEARTBURN: 0
HEMATURIA: 0
DIZZINESS: 0
HEMATOCHEZIA: 0
FREQUENCY: 0
ABDOMINAL PAIN: 0
WEAKNESS: 0
DYSURIA: 0
ARTHRALGIAS: 1
PARESTHESIAS: 0

## 2022-03-23 ASSESSMENT — ASTHMA QUESTIONNAIRES: ACT_TOTALSCORE: 25

## 2022-03-23 NOTE — PROGRESS NOTES
SUBJECTIVE:   CC: Shereen Oliver is an 57 year old woman who presents for preventive health visit.     Fasting.    Patient has been advised of split billing requirements and indicates understanding: Yes     Healthy Habits:     Getting at least 3 servings of Calcium per day:  Yes    Bi-annual eye exam:  Yes    Dental care twice a year:  Yes    Sleep apnea or symptoms of sleep apnea:  None    Diet:  Regular (no restrictions)    Frequency of exercise:  2-3 days/week    Duration of exercise:  30-45 minutes    Taking medications regularly:  Yes    Medication side effects:  None    PHQ-2 Total Score: 0    Additional concerns today:  Yes        Today's PHQ-2 Score:   PHQ-2 ( 1999 Pfizer) 3/23/2022   Q1: Little interest or pleasure in doing things 0   Q2: Feeling down, depressed or hopeless 0   PHQ-2 Score 0   PHQ-2 Total Score (12-17 Years)- Positive if 3 or more points; Administer PHQ-A if positive -   Q1: Little interest or pleasure in doing things Not at all   Q2: Feeling down, depressed or hopeless Not at all   PHQ-2 Score 0       Abuse: Current or Past (Physical, Sexual or Emotional) - No  Do you feel safe in your environment? Yes        Social History     Tobacco Use     Smoking status: Never Smoker     Smokeless tobacco: Never Used   Substance Use Topics     Alcohol use: Yes     Comment: seldom     If you drink alcohol do you typically have >3 drinks per day or >7 drinks per week? No    Alcohol Use 3/23/2022   Prescreen: >3 drinks/day or >7 drinks/week? No   Prescreen: >3 drinks/day or >7 drinks/week? -       Reviewed orders with patient.  Reviewed health maintenance and updated orders accordingly - Yes  BP Readings from Last 3 Encounters:   03/23/22 113/75   09/30/21 104/70   01/18/21 114/74    Wt Readings from Last 3 Encounters:   03/23/22 96.4 kg (212 lb 9.6 oz)   09/30/21 93.5 kg (206 lb 3.2 oz)   01/18/21 92.5 kg (204 lb)                    Breast Cancer Screening:    FHS-7:   Breast CA Risk Assessment (FHS-7)  3/23/2022   Did any of your first-degree relatives have breast or ovarian cancer? No   Did any of your relatives have bilateral breast cancer? No   Did any man in your family have breast cancer? No   Did any woman in your family have breast and ovarian cancer? No   Did any woman in your family have breast cancer before age 50 y? No   Do you have 2 or more relatives with breast and/or ovarian cancer? No   Do you have 2 or more relatives with breast and/or bowel cancer? No       Mammogram Screening: Recommended mammography every 1-2 years with patient discussion and risk factor consideration  Pertinent mammograms are reviewed under the imaging tab.    History of abnormal Pap smear: NO - age 30-65 PAP every 5 years with negative HPV co-testing recommended  PAP / HPV Latest Ref Rng & Units 3/13/2018 1/29/2014 11/1/2012   PAP (Historical) - NIL NIL NIL   HPV16 NEG:Negative Negative - -   HPV18 NEG:Negative Negative - -   HRHPV NEG:Negative Negative - -     Reviewed and updated as needed this visit by clinical staff   Tobacco  Allergies  Meds   Med Hx  Surg Hx  Fam Hx  Soc Hx        Reviewed and updated as needed this visit by Provider                   Review of Systems   Constitutional: Negative for chills and fever.   HENT: Positive for congestion. Negative for ear pain, hearing loss and sore throat.    Eyes: Negative for pain and visual disturbance.   Respiratory: Negative for cough and shortness of breath.    Cardiovascular: Negative for chest pain, palpitations and peripheral edema.   Gastrointestinal: Negative for abdominal pain, constipation, heartburn, hematochezia and nausea.   Breasts:  Negative for tenderness, breast mass and discharge.   Genitourinary: Negative for dysuria, frequency, genital sores, hematuria, pelvic pain, urgency, vaginal bleeding and vaginal discharge.   Musculoskeletal: Positive for arthralgias and myalgias.   Skin: Negative for rash.   Neurological: Negative for dizziness, weakness,  "headaches and paresthesias.   Psychiatric/Behavioral: Negative for mood changes. The patient is nervous/anxious.         OBJECTIVE:   /75 (BP Location: Right arm, Patient Position: Chair, Cuff Size: Adult Large)   Pulse 69   Temp 99  F (37.2  C) (Oral)   Resp 16   Ht 1.715 m (5' 7.5\")   Wt 96.4 kg (212 lb 9.6 oz)   SpO2 100%   Breastfeeding No   BMI 32.81 kg/m    Physical Exam  GENERAL: healthy, alert and no distress  EYES: Eyes grossly normal to inspection, PERRL and conjunctivae and sclerae normal  NECK: no adenopathy, no asymmetry, masses, or scars and thyroid normal to palpation  RESP: lungs clear to auscultation - no rales, rhonchi or wheezes  BREAST: normal without masses, tenderness or nipple discharge and no palpable axillary masses or adenopathy  CV: regular rate and rhythm, normal S1 S2, no S3 or S4, no murmur, click or rub, no peripheral edema and peripheral pulses strong  ABDOMEN: soft, nontender, no hepatosplenomegaly, no masses and bowel sounds normal  MS: no gross musculoskeletal defects noted, no edema  SKIN: no suspicious lesions or rashes  NEURO: Normal strength and tone, mentation intact and speech normal  PSYCH: mentation appears normal, affect normal/bright      ASSESSMENT/PLAN:   (Z00.00) Preventative health care  (primary encounter diagnosis)  Comment:    Plan: CBC with platelets and differential, TSH with         free T4 reflex, Comprehensive metabolic panel         (BMP + Alb, Alk Phos, ALT, AST, Total. Bili,         TP), Lipid Profile (Chol, Trig, HDL, LDL calc)             (Z11.4) Screening for HIV (human immunodeficiency virus)  Comment:    Plan: HIV Antigen Antibody Combo             (Z11.59) Need for hepatitis C screening test  Comment:    Plan: Hepatitis C Screen Reflex to HCV RNA Quant and         Genotype             (R21) Rash  Comment:    Plan: acyclovir (ZOVIRAX) 400 MG tablet             (J45.20) Mild intermittent asthma without complication  Comment:    Plan: " "albuterol (PROAIR HFA/PROVENTIL HFA/VENTOLIN         HFA) 108 (90 Base) MCG/ACT inhaler             (G62.9) Neuropathy  Comment:    Plan: gabapentin (NEURONTIN) 100 MG capsule             (Z98.890) S/P biopsy  Comment:    Plan: lidocaine-prilocaine (EMLA) 2.5-2.5 % external         cream             (G95.89) Myelomalacia of cervical cord (H)  Comment:    Plan:      (D69.6) Thrombocytopenia (H)  Comment:    Plan: stable, continue to follow    Patient has been advised of split billing requirements and indicates understanding: Yes    COUNSELING:  Reviewed preventive health counseling, as reflected in patient instructions       Regular exercise       Healthy diet/nutrition    Estimated body mass index is 32.81 kg/m  as calculated from the following:    Height as of this encounter: 1.715 m (5' 7.5\").    Weight as of this encounter: 96.4 kg (212 lb 9.6 oz).    Weight management plan: Discussed healthy diet and exercise guidelines    She reports that she has never smoked. She has never used smokeless tobacco.      Counseling Resources:  ATP IV Guidelines  Pooled Cohorts Equation Calculator  Breast Cancer Risk Calculator  BRCA-Related Cancer Risk Assessment: FHS-7 Tool  FRAX Risk Assessment  ICSI Preventive Guidelines  Dietary Guidelines for Americans, 2010  USDA's MyPlate  ASA Prophylaxis  Lung CA Screening    oJdy Ribera MD  [facility]  "

## 2022-03-24 ENCOUNTER — TELEPHONE (OUTPATIENT)
Dept: INTERNAL MEDICINE | Facility: CLINIC | Age: 57
End: 2022-03-24
Payer: COMMERCIAL

## 2022-03-24 DIAGNOSIS — Z98.890 S/P BIOPSY: ICD-10-CM

## 2022-03-24 NOTE — TELEPHONE ENCOUNTER
Prior Authorization Retail Medication Request    Medication/Dose: lidocaine-prilocaine (EMLA) 2.5-2.5 % external cream  ICD code (if different than what is on RX):    Previously Tried and Failed:    Rationale:      Insurance Name:  CoverMeds   Insurance ID:  [id number]      Pharmacy Information (if different than what is on RX)  Name:  [facility]  Phone:

## 2022-03-28 NOTE — TELEPHONE ENCOUNTER
Central Prior Authorization Team   Phone: [phone]    PA Initiation    Medication: lidocaine-prilocaine (EMLA) 2.5-2.5 % external cream  Insurance Company: [employer] [place] - Phone [phone] Fax [phone]  Pharmacy Filling the Rx: [id number] - [place], [place] - [address] [place] [address] [address] [facility] [address] [facility]  Filling Pharmacy Phone: [phone]  Filling Pharmacy Fax:    Start Date: 3/28/2022

## 2022-03-29 NOTE — TELEPHONE ENCOUNTER
Prior Authorization Approval    Authorization Effective Date: 3/26/2022  Authorization Expiration Date: 3/26/2023  Medication: lidocaine-prilocaine (EMLA) 2.5-2.5 % external cream  Approved Dose/Quantity:    Reference #:     Insurance Company: [employer] [place] - Phone [phone] Fax [phone]  Expected CoPay:       CoPay Card Available:      Foundation Assistance Needed:    Which Pharmacy is filling the prescription (Not needed for infusion/clinic administered): [id number] - [place], [place] - [address] [place] [address] [address]  AT [place] [address]  Pharmacy Notified: Yes  Patient Notified: Yes  **Instructed pharmacy to notify patient when script is ready to /ship.**

## 2022-11-20 ENCOUNTER — HEALTH MAINTENANCE LETTER (OUTPATIENT)
Age: 57
End: 2022-11-20

## 2023-01-27 ENCOUNTER — OFFICE VISIT (OUTPATIENT)
Dept: INTERNAL MEDICINE | Facility: CLINIC | Age: 58
End: 2023-01-27
Payer: COMMERCIAL

## 2023-01-27 ENCOUNTER — E-VISIT (OUTPATIENT)
Dept: URGENT CARE | Facility: CLINIC | Age: 58
End: 2023-01-27
Payer: COMMERCIAL

## 2023-01-27 VITALS
OXYGEN SATURATION: 99 % | WEIGHT: 203.9 LBS | SYSTOLIC BLOOD PRESSURE: 126 MMHG | RESPIRATION RATE: 18 BRPM | DIASTOLIC BLOOD PRESSURE: 74 MMHG | HEIGHT: 68 IN | HEART RATE: 78 BPM | TEMPERATURE: 98.2 F | BODY MASS INDEX: 30.9 KG/M2

## 2023-01-27 DIAGNOSIS — R05.1 ACUTE COUGH: Primary | ICD-10-CM

## 2023-01-27 DIAGNOSIS — J20.9 ACUTE BRONCHITIS, UNSPECIFIED ORGANISM: Primary | ICD-10-CM

## 2023-01-27 DIAGNOSIS — J45.21 MILD INTERMITTENT ASTHMA WITH ACUTE EXACERBATION: ICD-10-CM

## 2023-01-27 PROBLEM — M46.1 SACROILIITIS, NOT ELSEWHERE CLASSIFIED (H): Status: ACTIVE | Noted: 2023-01-27

## 2023-01-27 PROBLEM — F41.1 GENERALIZED ANXIETY DISORDER: Status: ACTIVE | Noted: 2023-01-27

## 2023-01-27 PROBLEM — E78.5 HYPERLIPIDEMIA: Status: ACTIVE | Noted: 2023-01-27

## 2023-01-27 PROCEDURE — 99421 OL DIG E/M SVC 5-10 MIN: CPT | Performed by: EMERGENCY MEDICINE

## 2023-01-27 PROCEDURE — 99213 OFFICE O/P EST LOW 20 MIN: CPT | Performed by: INTERNAL MEDICINE

## 2023-01-27 RX ORDER — BENZONATATE 200 MG/1
200 CAPSULE ORAL 3 TIMES DAILY PRN
Qty: 30 CAPSULE | Refills: 0 | Status: SHIPPED | OUTPATIENT
Start: 2023-01-27 | End: 2023-04-21

## 2023-01-27 RX ORDER — PREDNISONE 10 MG/1
TABLET ORAL
Qty: 20 TABLET | Refills: 0 | Status: SHIPPED | OUTPATIENT
Start: 2023-01-27 | End: 2023-04-21

## 2023-01-27 RX ORDER — AZITHROMYCIN 250 MG/1
TABLET, FILM COATED ORAL
Qty: 6 TABLET | Refills: 0 | Status: SHIPPED | OUTPATIENT
Start: 2023-01-27 | End: 2023-02-01

## 2023-01-27 ASSESSMENT — ASTHMA QUESTIONNAIRES
QUESTION_2 LAST FOUR WEEKS HOW OFTEN HAVE YOU HAD SHORTNESS OF BREATH: NOT AT ALL
ACT_TOTALSCORE: 25
QUESTION_1 LAST FOUR WEEKS HOW MUCH OF THE TIME DID YOUR ASTHMA KEEP YOU FROM GETTING AS MUCH DONE AT WORK, SCHOOL OR AT HOME: NONE OF THE TIME
QUESTION_5 LAST FOUR WEEKS HOW WOULD YOU RATE YOUR ASTHMA CONTROL: COMPLETELY CONTROLLED
ACT_TOTALSCORE: 25
QUESTION_4 LAST FOUR WEEKS HOW OFTEN HAVE YOU USED YOUR RESCUE INHALER OR NEBULIZER MEDICATION (SUCH AS ALBUTEROL): NOT AT ALL
QUESTION_3 LAST FOUR WEEKS HOW OFTEN DID YOUR ASTHMA SYMPTOMS (WHEEZING, COUGHING, SHORTNESS OF BREATH, CHEST TIGHTNESS OR PAIN) WAKE YOU UP AT NIGHT OR EARLIER THAN USUAL IN THE MORNING: NOT AT ALL

## 2023-01-27 ASSESSMENT — PAIN SCALES - GENERAL: PAINLEVEL: NO PAIN (0)

## 2023-01-27 ASSESSMENT — ENCOUNTER SYMPTOMS: COUGH: 1

## 2023-01-27 NOTE — PATIENT INSTRUCTIONS
I suspect that an unnamed virus set this illness off.   We should err on the side of caution and treat potential bacteria with azithromycin (Zithromax).     Another medication that has previously helped you is Tessalon Perles.    These two medications have already been faxed to your pharmacy.   I'll add prednisone, which should help the cough by reducing the bronchial inflammation.     If this combination not helping over the next several days, get in touch with us.     Looks like pap smear due this year with OB-GYN.

## 2023-01-27 NOTE — PATIENT INSTRUCTIONS
"    Dear Shereen Oliver    After reviewing your responses, I've been able to diagnose you with \"Bronchitis\" which is a common infection of your lungs. While this is most commonly caused by a virus, the symptoms you have given suggest you should be treated with antibiotics.     I have sent zpak and cough medication to your pharmacy to treat this infection.     It is important that you take all of your prescribed medication even if your symptoms are improving after a few doses. Taking all of your medicine helps prevent the symptoms from returning.     If your symptoms worsen, you develop chest pain or shortness of breath, fevers over 101, or are not improving in 5 days, please contact your primary care provider for an appointment or visit any of our convenient Walk-in Care or Urgent Care Centers to be seen which can be found on our website here.    Thanks again for choosing us as your health care partner,    aDny Clements MD    "

## 2023-01-27 NOTE — PROGRESS NOTES
"  Assessment & Plan   (J20.9) Acute bronchitis, unspecified organism  (primary encounter diagnosis)  (J45.21) Mild intermittent asthma with acute exacerbation  Comment: Suspect an initial viral illness with some degree of bronchospasm.   Agree with antibiotics and symptomatic Rx with Tessalon Perles. Also advised a short burst of prednisone as ordered. See pt instructions and epic orders.   Plan: predniSONE (DELTASONE) 10 MG tablet             BMI:   Estimated body mass index is 31.46 kg/m  as calculated from the following:    Height as of this encounter: 1.715 m (5' 7.5\").    Weight as of this encounter: 92.5 kg (203 lb 14.4 oz).     Patient Instructions   I suspect that an unnamed virus set this illness off.   We should err on the side of caution and treat potential bacteria with azithromycin (Zithromax).     Another medication that has previously helped you is Tessalon Perles.    These two medications have already been faxed to your pharmacy.   I'll add prednisone, which should help the cough by reducing the bronchial inflammation.     If this combination not helping over the next several days, get in touch with us.     Looks like pap smear due this year with OB-GYN.       No follow-ups on file.    Jose L Santamaria MD,   [facility] JEAN-PIERRE Regan is a 57 year old, presenting for the following health issues:  Cough (Patient complains of deep cough since Sunday. Patient states that her ears are ringing and glands are swollen.)      Cough    History of Present Illness       Reason for visit:  Worsening cough over the week, feels like swollen glands, burning eyes, tired  Symptom onset:  3-7 days ago  Symptoms include:  Chest cough, burning eyes, swollen glands  Symptom intensity:  Moderate  Symptom progression:  Worsening  Had these symptoms before:  Yes  Has tried/received treatment for these symptoms:  Yes  Previous treatment was successful:  Yes  Prior treatment description:  " "Antibiotics and cough medicine  What makes it worse:  Right now exercising  What makes it better:  Sitting quietly and hot beverages    She eats 0-1 servings of fruits and vegetables daily.She consumes 0 sweetened beverage(s) daily.She exercises with enough effort to increase her heart rate 20 to 29 minutes per day.  She exercises with enough effort to increase her heart rate 3 or less days per week.   She is taking medications regularly.     The patient reports noting a \"scratchy throat\" on Sunday, and by Tuesday she developed a coarse, deep nonproductive chest cough which has only worsened.   She thinks she feels some swollen glands in her neck.   No ear ache, sore throat or sinus pain. No fever or chills. No dyspnea.     The patient had tried to get an E-visit but states that she cancelled it when she did not get a response.   She is advised that Rx's were sent to her pharmacy for Zithromax and Tessalon Perles.   She has found Tessalon Perles to be helpful in the past for controlling her cough.     Past medical, family and social histories as well as medications reviewed and updated as needed.    Review of Systems   Respiratory: Positive for cough.       REVIEW OF SYSTEMS: The following systems have been completely reviewed and are negative except as noted above:   Constitutional, HEENT, respiratory, cardiovascular, musculoskeletal systems.        Objective    Vitals: /74   Pulse 78   Temp 98.2  F (36.8  C) (Tympanic)   Resp 18   Ht 1.715 m (5' 7.5\")   Wt 92.5 kg (203 lb 14.4 oz)   LMP  (LMP Unknown)   SpO2 99%   Breastfeeding No   BMI 31.46 kg/m    BMI= Body mass index is 31.46 kg/m .    Physical Exam   GENERAL: healthy, alert and no distress  EYES: Eyes grossly normal to inspection, PERRL and conjunctivae and sclerae normal  HENT: ear canals and TM's normal, nose and mouth without ulcers or lesions  NECK: no adenopathy, no asymmetry, masses, or scars and thyroid normal to palpation  RESP: coarse " diffuse bronchial breath sounds, no consolidative changes.   CV: regular rate and rhythm, normal S1 S2, no S3 or S4, no murmur, click or rub, no peripheral edema and peripheral pulses strong  MS: no gross musculoskeletal defects noted, no edema  NEURO: Normal strength and tone, mentation intact and speech normal  PSYCH: mentation appears normal, affect normal/bright

## 2023-02-02 ENCOUNTER — MYC MEDICAL ADVICE (OUTPATIENT)
Dept: INTERNAL MEDICINE | Facility: CLINIC | Age: 58
End: 2023-02-02
Payer: COMMERCIAL

## 2023-02-09 NOTE — TELEPHONE ENCOUNTER
Patient calls again asking for direction with ongoing cough and sleeping a lot.She is treating with robitussin.        pls advise [phone]

## 2023-02-13 NOTE — TELEPHONE ENCOUNTER
Call placed to patient.   Patient feels better but is still having nasal congestion, persistent, productive cough, and fatigue.     Future appointment scheduled with same day provider. Home care advice given to patient.    Appointments in Next Year    Feb 15, 2023  4:30 PM  (Arrive by 4:10 PM)  Provider Visit with Oli Sotelo MD  [facility] ([facility] [place] ) [phone]

## 2023-03-24 ENCOUNTER — HOSPITAL ENCOUNTER (OUTPATIENT)
Dept: MAMMOGRAPHY | Facility: CLINIC | Age: 58
Discharge: HOME | End: 2023-03-24
Attending: INTERNAL MEDICINE | Admitting: INTERNAL MEDICINE
Payer: COMMERCIAL

## 2023-03-24 ENCOUNTER — TELEPHONE (OUTPATIENT)
Dept: OBGYN | Facility: CLINIC | Age: 58
End: 2023-03-24
Payer: COMMERCIAL

## 2023-03-24 DIAGNOSIS — Z12.31 VISIT FOR SCREENING MAMMOGRAM: ICD-10-CM

## 2023-03-24 PROCEDURE — 77067 SCR MAMMO BI INCL CAD: CPT

## 2023-03-24 NOTE — TELEPHONE ENCOUNTER
Pt with hx of gastric bypass and she needs a lot of labs drawn.    Spoke to her and advised to get physical with IM. She is fine with this and will keep appt for just pap with KT.    Alcira GAN RN BSN

## 2023-04-14 ASSESSMENT — ENCOUNTER SYMPTOMS
SHORTNESS OF BREATH: 0
COUGH: 0
WEAKNESS: 0
FEVER: 0
FREQUENCY: 0
ARTHRALGIAS: 1
BREAST MASS: 0
DIARRHEA: 0
PALPITATIONS: 0
NAUSEA: 0
JOINT SWELLING: 0
HEMATOCHEZIA: 0
NERVOUS/ANXIOUS: 0
HEADACHES: 0
MYALGIAS: 0
PARESTHESIAS: 0
DYSURIA: 0
HEARTBURN: 0
DIZZINESS: 0
HEMATURIA: 0
SORE THROAT: 0
EYE PAIN: 0
ABDOMINAL PAIN: 0
CHILLS: 0
CONSTIPATION: 0

## 2023-04-21 ENCOUNTER — OFFICE VISIT (OUTPATIENT)
Dept: INTERNAL MEDICINE | Facility: CLINIC | Age: 58
End: 2023-04-21
Payer: COMMERCIAL

## 2023-04-21 VITALS
RESPIRATION RATE: 14 BRPM | TEMPERATURE: 97.9 F | SYSTOLIC BLOOD PRESSURE: 101 MMHG | BODY MASS INDEX: 29.03 KG/M2 | DIASTOLIC BLOOD PRESSURE: 66 MMHG | HEART RATE: 85 BPM | OXYGEN SATURATION: 96 % | WEIGHT: 185 LBS | HEIGHT: 67 IN

## 2023-04-21 DIAGNOSIS — M25.561 ACUTE PAIN OF BOTH KNEES: ICD-10-CM

## 2023-04-21 DIAGNOSIS — J32.1 CHRONIC FRONTAL SINUSITIS: ICD-10-CM

## 2023-04-21 DIAGNOSIS — G62.9 NEUROPATHY: ICD-10-CM

## 2023-04-21 DIAGNOSIS — Z98.890 S/P BIOPSY: ICD-10-CM

## 2023-04-21 DIAGNOSIS — Z00.00 LABORATORY EXAMINATION ORDERED AS PART OF A ROUTINE GENERAL MEDICAL EXAMINATION: Primary | ICD-10-CM

## 2023-04-21 DIAGNOSIS — R21 RASH: ICD-10-CM

## 2023-04-21 DIAGNOSIS — M25.562 ACUTE PAIN OF BOTH KNEES: ICD-10-CM

## 2023-04-21 DIAGNOSIS — H93.13 TINNITUS, BILATERAL: ICD-10-CM

## 2023-04-21 DIAGNOSIS — Z13.220 LIPID SCREENING: ICD-10-CM

## 2023-04-21 DIAGNOSIS — J45.20 MILD INTERMITTENT ASTHMA WITHOUT COMPLICATION: ICD-10-CM

## 2023-04-21 LAB
ALBUMIN SERPL BCG-MCNC: 4.2 G/DL (ref 3.5–5.2)
ALP SERPL-CCNC: 88 U/L (ref 35–104)
ALT SERPL W P-5'-P-CCNC: 14 U/L (ref 10–35)
ANION GAP SERPL CALCULATED.3IONS-SCNC: 9 MMOL/L (ref 7–15)
AST SERPL W P-5'-P-CCNC: 22 U/L (ref 10–35)
BILIRUB SERPL-MCNC: 0.7 MG/DL
BUN SERPL-MCNC: 7.6 MG/DL (ref 8–23)
CALCIUM SERPL-MCNC: 9.6 MG/DL (ref 8.8–10.2)
CHLORIDE SERPL-SCNC: 106 MMOL/L (ref 98–107)
CHOLEST SERPL-MCNC: 163 MG/DL
CREAT SERPL-MCNC: 0.88 MG/DL (ref 0.51–0.95)
DEPRECATED HCO3 PLAS-SCNC: 26 MMOL/L (ref 22–29)
ERYTHROCYTE [DISTWIDTH] IN BLOOD BY AUTOMATED COUNT: 13.1 % (ref 10–15)
GFR SERPL CREATININE-BSD FRML MDRD: 75 ML/MIN/1.73M2
GLUCOSE SERPL-MCNC: 91 MG/DL (ref 70–99)
HCT VFR BLD AUTO: 40.3 % (ref 35–47)
HDLC SERPL-MCNC: 59 MG/DL
HGB BLD-MCNC: 13.6 G/DL (ref 11.7–15.7)
HOLD SPECIMEN: NORMAL
LDLC SERPL CALC-MCNC: 90 MG/DL
MCH RBC QN AUTO: 29.5 PG (ref 26.5–33)
MCHC RBC AUTO-ENTMCNC: 33.7 G/DL (ref 31.5–36.5)
MCV RBC AUTO: 87 FL (ref 78–100)
NONHDLC SERPL-MCNC: 104 MG/DL
PLATELET # BLD AUTO: 130 10E3/UL (ref 150–450)
POTASSIUM SERPL-SCNC: 4.5 MMOL/L (ref 3.4–5.3)
PROT SERPL-MCNC: 6.8 G/DL (ref 6.4–8.3)
RBC # BLD AUTO: 4.61 10E6/UL (ref 3.8–5.2)
SODIUM SERPL-SCNC: 141 MMOL/L (ref 136–145)
TRIGL SERPL-MCNC: 72 MG/DL
TSH SERPL DL<=0.005 MIU/L-ACNC: 1.41 UIU/ML (ref 0.3–4.2)
VIT B12 SERPL-MCNC: 498 PG/ML (ref 232–1245)
WBC # BLD AUTO: 4.1 10E3/UL (ref 4–11)

## 2023-04-21 PROCEDURE — 36415 COLL VENOUS BLD VENIPUNCTURE: CPT

## 2023-04-21 PROCEDURE — 82607 VITAMIN B-12: CPT

## 2023-04-21 PROCEDURE — 82306 VITAMIN D 25 HYDROXY: CPT

## 2023-04-21 PROCEDURE — 80053 COMPREHEN METABOLIC PANEL: CPT

## 2023-04-21 PROCEDURE — 84443 ASSAY THYROID STIM HORMONE: CPT

## 2023-04-21 PROCEDURE — 99214 OFFICE O/P EST MOD 30 MIN: CPT

## 2023-04-21 PROCEDURE — 85027 COMPLETE CBC AUTOMATED: CPT

## 2023-04-21 PROCEDURE — 80061 LIPID PANEL: CPT

## 2023-04-21 RX ORDER — GABAPENTIN 100 MG/1
CAPSULE ORAL
Qty: 180 CAPSULE | Refills: 3 | Status: SHIPPED | OUTPATIENT
Start: 2023-04-21 | End: 2024-04-15

## 2023-04-21 RX ORDER — LIDOCAINE/PRILOCAINE 2.5 %-2.5%
CREAM (GRAM) TOPICAL EVERY 6 HOURS PRN
Qty: 70 G | Refills: 1 | Status: SHIPPED | OUTPATIENT
Start: 2023-04-21 | End: 2023-05-04

## 2023-04-21 RX ORDER — ALBUTEROL SULFATE 90 UG/1
2 AEROSOL, METERED RESPIRATORY (INHALATION) EVERY 4 HOURS PRN
Qty: 18 G | Refills: 4 | Status: SHIPPED | OUTPATIENT
Start: 2023-04-21 | End: 2024-05-15

## 2023-04-21 RX ORDER — ACYCLOVIR 400 MG/1
400 TABLET ORAL 3 TIMES DAILY
Qty: 21 TABLET | Refills: 5 | Status: SHIPPED | OUTPATIENT
Start: 2023-04-21 | End: 2024-05-15

## 2023-04-21 RX ORDER — FLUTICASONE PROPIONATE 50 MCG
1 SPRAY, SUSPENSION (ML) NASAL DAILY
Qty: 16 G | Refills: 3 | Status: SHIPPED | OUTPATIENT
Start: 2023-04-21 | End: 2024-05-15

## 2023-04-21 ASSESSMENT — ENCOUNTER SYMPTOMS
SHORTNESS OF BREATH: 0
WEAKNESS: 0
EYE PAIN: 0
DYSURIA: 0
ARTHRALGIAS: 1
DIZZINESS: 0
ABDOMINAL PAIN: 0
HEADACHES: 0
FEVER: 0
FREQUENCY: 0
PALPITATIONS: 0
SORE THROAT: 0
NERVOUS/ANXIOUS: 0
NAUSEA: 0
JOINT SWELLING: 0
CONSTIPATION: 0
BREAST MASS: 0
MYALGIAS: 0
COUGH: 0
HEMATURIA: 0
CHILLS: 0
HEMATOCHEZIA: 0
HEARTBURN: 0
PARESTHESIAS: 0
DIARRHEA: 0

## 2023-04-21 NOTE — PROGRESS NOTES
"  Assessment & Plan     Laboratory examination ordered as part of a routine general medical examination  Patient is physical with Dr. Lubin's but is requesting labs through us.  Has history of gastric bypass  - Comprehensive metabolic panel (BMP + Alb, Alk Phos, ALT, AST, Total. Bili, TP); Future  - Vitamin D Deficiency; Future  - Vitamin B12; Future  - CBC with Platelets and Reflex to Iron Studies; Future  - TSH with free T4 reflex; Future  - Comprehensive metabolic panel (BMP + Alb, Alk Phos, ALT, AST, Total. Bili, TP)  - Vitamin D Deficiency  - Vitamin B12  - CBC with Platelets and Reflex to Iron Studies  - TSH with free T4 reflex    Lipid screening  screen  - Lipid panel reflex to direct LDL Fasting; Future  - Lipid panel reflex to direct LDL Fasting    Neuropathy  Reorder  - gabapentin (NEURONTIN) 100 MG capsule; 1-2 per day as needed for pain    S/P biopsy  Patient uses for pain.  Will reorder  - lidocaine-prilocaine (EMLA) 2.5-2.5 % external cream; Apply topically every 6 hours as needed for moderate pain Profile Rx: patient will contact pharmacy when needed    Mild intermittent asthma without complication  Reorder-uses before exercise sometimes  - albuterol (PROAIR HFA/PROVENTIL HFA/VENTOLIN HFA) 108 (90 Base) MCG/ACT inhaler; Inhale 2 puffs into the lungs every 4 hours as needed for shortness of breath    Rash  Reorder  - acyclovir (ZOVIRAX) 400 MG tablet; Take 1 tablet (400 mg) by mouth 3 times daily    Acute pain of both knees  Reorder  - diclofenac (VOLTAREN) 1 % topical gel; Apply 4 g topically 4 times daily    Chronic frontal sinusitis  Reorder  - fluticasone (FLONASE) 50 MCG/ACT nasal spray; Spray 1 spray into both nostrils daily    Tinnitus, bilateral  Reorder  - fluticasone (FLONASE) 50 MCG/ACT nasal spray; Spray 1 spray into both nostrils daily       BMI:   Estimated body mass index is 28.98 kg/m  as calculated from the following:    Height as of this encounter: 1.702 m (5' 7\").    Weight as of " "this encounter: 83.9 kg (185 lb).       Jered Luna NP  [facility] JEAN-PIERRE Regan is a 58 year old, presenting for the following health issues:  Lab Only (She is fasting and only want labs done today (Physical labs). She is having her physical and pap next month with Dr. Rosado.)        4/21/2023     7:50 AM   Additional Questions   Roomed by Keli Larkin MA   Accompanied by Herself     HPI     Is doing physical with Dr. Lubin's     S/p gastric bypass.    Needs meds refilled    Review of Systems   Constitutional: Negative for chills and fever.   HENT: Negative for congestion, ear pain, hearing loss and sore throat.    Eyes: Negative for pain and visual disturbance.   Respiratory: Negative for cough and shortness of breath.    Cardiovascular: Negative for chest pain, palpitations and peripheral edema.   Gastrointestinal: Negative for abdominal pain, constipation, diarrhea, heartburn, hematochezia and nausea.   Breasts:  Negative for tenderness, breast mass and discharge.   Genitourinary: Negative for dysuria, frequency, genital sores, hematuria, pelvic pain, urgency, vaginal bleeding and vaginal discharge.   Musculoskeletal: Positive for arthralgias. Negative for joint swelling and myalgias.   Skin: Negative for rash.   Neurological: Negative for dizziness, weakness, headaches and paresthesias.   Psychiatric/Behavioral: Negative for mood changes. The patient is not nervous/anxious.       Constitutional, HEENT, cardiovascular, pulmonary, GI, , musculoskeletal, neuro, skin, endocrine and psych systems are negative, except as otherwise noted.      Objective    /66 (BP Location: Right arm, Patient Position: Sitting, Cuff Size: Adult Large)   Pulse 85   Temp 97.9  F (36.6  C) (Oral)   Resp 14   Ht 1.702 m (5' 7\")   Wt 83.9 kg (185 lb)   SpO2 96%   BMI 28.98 kg/m    Body mass index is 28.98 kg/m .  Physical Exam   GENERAL: alert and no distress  EYES: Eyes grossly " normal to inspection  HENT: ear canals and TM's normal, nose and mouth without ulcers or lesions  NECK: no adenopathy, no asymmetry, masses, or scars and thyroid normal to palpation  RESP: lungs clear to auscultation - no rales, rhonchi or wheezes  CV: regular rate and rhythm, normal S1 S2, no S3 or S4, no murmur, click or rub, no peripheral edema and peripheral pulses strong  MS: no gross musculoskeletal defects noted, no edema  SKIN: no suspicious lesions or rashes  NEURO: Normal strength and tone, mentation intact and speech normal  PSYCH: mentation appears normal, affect normal/bright

## 2023-04-22 LAB — DEPRECATED CALCIDIOL+CALCIFEROL SERPL-MC: 41 UG/L (ref 20–75)

## 2023-04-26 ENCOUNTER — TELEPHONE (OUTPATIENT)
Dept: INTERNAL MEDICINE | Facility: CLINIC | Age: 58
End: 2023-04-26
Payer: COMMERCIAL

## 2023-04-26 NOTE — TELEPHONE ENCOUNTER
Prior Authorization Retail Medication Request    Medication/Dose: diclofenac (VOLTAREN) 1 % topical gel  ICD code (if different than what is on RX):  Acute pain of both knees [M25.561, M25.562]   Previously Tried and Failed:   Rationale:      Insurance Name:[employer]    Insurance ID:  [id number]    Pharmacy Information (if different than what is on RX)  Name:  [id number] [address] [place] [address]  Phone:  [phone]

## 2023-05-01 NOTE — TELEPHONE ENCOUNTER
Prior Authorization Approval    Authorization Effective Date: 4/1/2023  Authorization Expiration Date: 4/30/2024  Medication: diclofenac (VOLTAREN) 1 % topical gel  Approved Dose/Quantity:   Reference #: [id number]   Insurance Company: [employer] - Phone [phone] Fax [phone]  Which Pharmacy is filling the prescription (Not needed for infusion/clinic administered): [id number] - [place], [place] - [address] [place] [address] [address]  AT [facility]  Pharmacy Notified: Yes  Patient Notified: Yes

## 2023-05-01 NOTE — TELEPHONE ENCOUNTER
PA Initiation    Medication: diclofenac (VOLTAREN) 1 % topical gel  Insurance Company: [employer] - Phone [phone] Fax [phone]  Pharmacy Filling the Rx: [facility] #[id number] - [place], [place] - [address] [place] [address] [address] AT [facility]  Filling Pharmacy Phone: [phone]  Filling Pharmacy Fax: [phone]  Start Date: 5/1/2023

## 2023-05-04 ENCOUNTER — OFFICE VISIT (OUTPATIENT)
Dept: OBGYN | Facility: CLINIC | Age: 58
End: 2023-05-04
Payer: COMMERCIAL

## 2023-05-04 VITALS
HEIGHT: 67 IN | SYSTOLIC BLOOD PRESSURE: 106 MMHG | WEIGHT: 182 LBS | DIASTOLIC BLOOD PRESSURE: 62 MMHG | BODY MASS INDEX: 28.56 KG/M2

## 2023-05-04 DIAGNOSIS — Z12.4 ENCOUNTER FOR PAPANICOLAOU SMEAR FOR CERVICAL CANCER SCREENING: Primary | ICD-10-CM

## 2023-05-04 DIAGNOSIS — Z98.890 S/P BIOPSY: ICD-10-CM

## 2023-05-04 PROCEDURE — G0145 SCR C/V CYTO,THINLAYER,RESCR: HCPCS | Performed by: FAMILY MEDICINE

## 2023-05-04 PROCEDURE — 99396 PREV VISIT EST AGE 40-64: CPT | Performed by: FAMILY MEDICINE

## 2023-05-04 PROCEDURE — 87624 HPV HI-RISK TYP POOLED RSLT: CPT | Performed by: FAMILY MEDICINE

## 2023-05-04 RX ORDER — LIDOCAINE/PRILOCAINE 2.5 %-2.5%
CREAM (GRAM) TOPICAL EVERY 6 HOURS PRN
Qty: 70 G | Refills: 1 | Status: SHIPPED | OUTPATIENT
Start: 2023-05-04

## 2023-05-04 NOTE — PROGRESS NOTES
SUBJECTIVE:  Shereen Oliver is an 58 year old postmenopausal woman   who presents for annual gyn exam. Menopause at age 50s. No   bleeding, spotting, or discharge noted. Concerns:  none    Estrogen replacement therapy: never  JOSUE exposure: no  History of abnormal Pap smear: Yes: in 20s now normal after cryotherapy in her 20s  Family history of uterine or ovarian cancer: No  Regular self breast exam: Yes  History of abnormal mammogram: No  Family history of breast cancer: No  History of abnormal lipids: No    Past Medical History:   Diagnosis Date     Agoraphobia with panic disorder     elevators and cars     Arthritis      Diabetes mellitus     type 2 resolved after gastric bypass     Disturbance of skin sensation     left leg as residual of neck surgery     Gastro-oesophageal reflux disease      Genital herpes, unspecified      Intrinsic asthma, unspecified     exercise induced     Obesity      Other psoriasis      Spasm of muscle     left leg as residual of neck surgery          Family History   Problem Relation Age of Onset     Gastrointestinal Disease Mother         gallbladder stones     Cerebrovascular Disease Mother      Hypertension Father      Cerebrovascular Disease Father      Family History Negative Brother      Diabetes Maternal Grandfather        Past Surgical History:   Procedure Laterality Date     ABDOMEN SURGERY  2008    Gastric Bypass     BIOPSY VULVA Left 11/10/2015    Procedure: BIOPSY VULVA;  Surgeon: Diana Rosado DO;  Location: [facility] OR     C IUD,MIRENA  06/01/2010     CHOLECYSTECTOMY, LAPOROSCOPIC  01/01/2007    Cholecystectomy, Laparoscopic     COLONOSCOPY N/A 01/20/2017    Procedure: COLONOSCOPY;  Surgeon: Tank Peguero MD;  Location:  GI     D & C  06/29/2010    removal of polyp and placement of Mirena     DAVINCI BYPASS GASTRIC BRANT-EN-Y       ZZC VILLA W/O FACETEC FORAMOT/DSKC 1/2 VRT SEG, CERVICAL  01/01/2001    fusion c4-5-6,herniated discs       Current Outpatient Medications  "  Medication     acyclovir (ZOVIRAX) 400 MG tablet     albuterol (PROAIR HFA/PROVENTIL HFA/VENTOLIN HFA) 108 (90 Base) MCG/ACT inhaler     Biotin 2500 MCG CAPS     diclofenac (VOLTAREN) 1 % topical gel     fluticasone (FLONASE) 50 MCG/ACT nasal spray     gabapentin (NEURONTIN) 100 MG capsule     HERBALS     lidocaine-prilocaine (EMLA) 2.5-2.5 % external cream     omeprazole (PRILOSEC) 40 MG capsule     No current facility-administered medications for this visit.     Allergies   Allergen Reactions     No Known Drug Allergy        Social History     Tobacco Use     Smoking status: Never     Passive exposure: Never     Smokeless tobacco: Never   Vaping Use     Vaping status: Never Used     Passive vaping exposure: Yes   Substance Use Topics     Alcohol use: Yes     Comment: seldom       Review Of Systems  Ears/Nose/Throat: negative  Respiratory: No shortness of breath, dyspnea on exertion, cough, or hemoptysis  Cardiovascular: negative  Gastrointestinal: negative  Genitourinary: See HPI   Constitutional, HEENT, cardiovascular, pulmonary, GI, , musculoskeletal, neuro, skin, endocrine and psych systems are negative, except as otherwise noted.    OBJECTIVE:  /62   Ht 1.702 m (5' 7\")   Wt 82.6 kg (182 lb)   BMI 28.51 kg/m    General appearance: healthy, alert and no distress  Skin: Skin color, texture, turgor normal. No rashes or lesions.  Ears: negative  Nose/Sinuses: Nares normal. Septum midline. Mucosa normal. No drainage or sinus tenderness.  Oropharynx: Lips, mucosa, and tongue normal. Teeth and gums normal.  Neck: Neck supple. No adenopathy. Thyroid symmetric, normal size,, Carotids without bruits.  Lungs: negative, Percussion normal. Good diaphragmatic excursion. Lungs clear  Heart: negative, PMI normal. No lifts, heaves, or thrills. RRR. No murmurs, clicks gallops or rub  Breasts: Inspection negative. No nipple discharge or bleeding. No masses.  Abdomen: Abdomen soft, non-tender. BS normal. No masses, " organomegaly  Pelvic: Pelvic:  Pelvic examination with  pap/no Gonorrhea and Chlamydia   including  External genitalia normal   and vagina normal rugatted atrophic  Examination of urethra  normal no masses, tenderness, scarring  bladder, no masses or tenderness  Cervix no lesions or discharge  Bimanual exam with   Uterus 6 weeks size, mid position, mobile,non-tenderness, no descent   Adnexa/parametria  Normal no masses     ASSESSMENT:  Shereen Oliver is an 58 year old No obstetric history on file. postmenopausal woman   who presents for annual gyn exam.   Concerns:  Skin tag, vulvar  PLAN:  Dx:  1)  Pap smear completed, mammogram previously completed, Colonoscopy 1/2027  2)  Lipids at appropriate intervals  3)  Covid-19 concerns: covid infection and vaccination recommendations discussed.   4)  Menopause:  Coping well   5)  Skin tag, vulvar:  Will return for removal     PE:  Reviewed health maintenance including diet, regular exercise,   estrogen replacement and periodic exams.    Dr. Diana Rosado, DO    Obstetrics and Gynecology  [facility] - [place] and [place]

## 2023-05-04 NOTE — NURSING NOTE
"Chief Complaint   Patient presents with     Gyn Exam     Pap due       Initial /62   Ht 1.702 m (5' 7\")   Wt 82.6 kg (182 lb)   BMI 28.51 kg/m   Estimated body mass index is 28.51 kg/m  as calculated from the following:    Height as of this encounter: 1.702 m (5' 7\").    Weight as of this encounter: 82.6 kg (182 lb).  BP completed using cuff size: regular    Questioned patient about current smoking habits.  Pt. has never smoked.      No obstetric history on file.    The following HM Due: pap smear      "

## 2023-05-04 NOTE — PATIENT INSTRUCTIONS
Return yearly!   Dr. Diana Rosado, DO    Obstetrics and Gynecology  [facility] - [place] and [place]

## 2023-05-09 LAB
BKR LAB AP GYN ADEQUACY: NORMAL
BKR LAB AP GYN INTERPRETATION: NORMAL
BKR LAB AP HPV REFLEX: NORMAL
BKR LAB AP PREVIOUS ABNORMAL: NORMAL
PATH REPORT.COMMENTS IMP SPEC: NORMAL
PATH REPORT.COMMENTS IMP SPEC: NORMAL
PATH REPORT.RELEVANT HX SPEC: NORMAL

## 2023-05-10 LAB
HUMAN PAPILLOMA VIRUS 16 DNA: NEGATIVE
HUMAN PAPILLOMA VIRUS 18 DNA: NEGATIVE
HUMAN PAPILLOMA VIRUS FINAL DIAGNOSIS: NORMAL
HUMAN PAPILLOMA VIRUS OTHER HR: NEGATIVE

## 2023-06-27 NOTE — TELEPHONE ENCOUNTER
Diagnosis: Persistent HPV infection  Procedures: LEEP  Length of time for surgery:1 hour  Dates/times requesting: refer to call schedule  Preop meds and instructions: n/a  Time off from work for Pt:1 week  Post op check: 2 weeks after procedure  Medical Clearance: Yes       Patient has no knowledge of JOSUE exposure   Please take off patient  Chart    D/w patient    Dr. Diana Rosado, DO    Obstetrics and Gynecology  [facility] - [place] and [place]

## 2024-02-19 ENCOUNTER — E-VISIT (OUTPATIENT)
Dept: URGENT CARE | Facility: CLINIC | Age: 59
End: 2024-02-19
Payer: COMMERCIAL

## 2024-02-19 ENCOUNTER — VIRTUAL VISIT (OUTPATIENT)
Dept: FAMILY MEDICINE | Facility: CLINIC | Age: 59
End: 2024-02-19
Payer: COMMERCIAL

## 2024-02-19 DIAGNOSIS — J01.90 ACUTE SINUSITIS WITH SYMPTOMS > 10 DAYS: Primary | ICD-10-CM

## 2024-02-19 DIAGNOSIS — J20.9 ACUTE BRONCHITIS WITH SYMPTOMS > 10 DAYS: ICD-10-CM

## 2024-02-19 PROCEDURE — 99207 PR NON-BILLABLE SERV PER CHARTING: CPT | Performed by: NURSE PRACTITIONER

## 2024-02-19 PROCEDURE — 99213 OFFICE O/P EST LOW 20 MIN: CPT | Mod: 95 | Performed by: FAMILY MEDICINE

## 2024-02-19 ASSESSMENT — ASTHMA QUESTIONNAIRES
QUESTION_2 LAST FOUR WEEKS HOW OFTEN HAVE YOU HAD SHORTNESS OF BREATH: ONCE OR TWICE A WEEK
QUESTION_4 LAST FOUR WEEKS HOW OFTEN HAVE YOU USED YOUR RESCUE INHALER OR NEBULIZER MEDICATION (SUCH AS ALBUTEROL): ONCE A WEEK OR LESS
QUESTION_5 LAST FOUR WEEKS HOW WOULD YOU RATE YOUR ASTHMA CONTROL: COMPLETELY CONTROLLED
ACT_TOTALSCORE: 22
QUESTION_1 LAST FOUR WEEKS HOW MUCH OF THE TIME DID YOUR ASTHMA KEEP YOU FROM GETTING AS MUCH DONE AT WORK, SCHOOL OR AT HOME: A LITTLE OF THE TIME
QUESTION_3 LAST FOUR WEEKS HOW OFTEN DID YOUR ASTHMA SYMPTOMS (WHEEZING, COUGHING, SHORTNESS OF BREATH, CHEST TIGHTNESS OR PAIN) WAKE YOU UP AT NIGHT OR EARLIER THAN USUAL IN THE MORNING: NOT AT ALL
ACT_TOTALSCORE: 22

## 2024-02-19 NOTE — PROGRESS NOTES
Shereen is a 58 year old who is being evaluated via a billable video visit.      How would you like to obtain your AVS? MyChart  If the video visit is dropped, the invitation should be resent by: Text to cell phone: [phone]  Will anyone else be joining your video visit? No              Subjective   Shereen is a 58 year old, presenting for the following health issues:  Cold Symptoms        2/19/2024     8:11 AM   Additional Questions   Roomed by Megan Bowen     History of Present Illness       Reason for visit:  3 weeks cough  Symptom onset:  3-4 weeks ago  Symptoms include:  Dry barky cough plus sinusitis  Symptom intensity:  Moderate  Symptom progression:  Staying the same  Had these symptoms before:  Yes  What makes it better:  Rest    She eats 0-1 servings of fruits and vegetables daily.She consumes 0 sweetened beverage(s) daily.She exercises with enough effort to increase her heart rate 60 or more minutes per day.  She exercises with enough effort to increase her heart rate 4 days per week.   She is taking medications regularly.             Often gets sinusitis    Now also back into chest    Inhaler not doing a lot  Albuterol    Talking gets the coughing going     Hybrid work    Taking generic cough syrup    Sleep okay    Got on elliptical yesterday    Dry barky    Occasional phlegm    Saline in nose           Objective           Vitals:  No vitals were obtained today due to virtual visit.    Physical Exam   GENERAL: alert and no distress  EYES: Eyes grossly normal to inspection.  No discharge or erythema, or obvious scleral/conjunctival abnormalities.  RESP: lungs clear to auscultation - no rales, rhonchi or wheezes but repeatedly coughing during our visit.  No resp distress  SKIN: Visible skin clear. No significant rash, abnormal pigmentation or lesions.  NEURO: Cranial nerves grossly intact.  Mentation and speech appropriate for age.  PSYCH: Appropriate affect, tone, and pace of words     ASSESSMENT /  PLAN:  (J01.90) Acute sinusitis with symptoms > 10 days  (primary encounter diagnosis)  Comment: given duration of symptoms, reasonable to treat with antibiotics.  Patient has had augmentin in past, no side effects.   Plan: amoxicillin-clavulanate (AUGMENTIN) 875-125 MG         tablet             (J20.9) Acute bronchitis with symptoms > 10 days  Comment: as above   Plan: amoxicillin-clavulanate (AUGMENTIN) 875-125 MG         tablet             Discussed symptomatic treatment     Follow up prn       I reviewed the patient's medications, allergies, medical history, family history, and social history.    Mika Willson MD        Video-Visit Details    Type of service:  Video Visit     Originating Location (pt. Location): Home    Distant Location (provider location):  On-site  Platform used for Video Visit: [facility]  Signed Electronically by: Mika Willson MD

## 2024-02-19 NOTE — PATIENT INSTRUCTIONS
Acute Sinusitis: Care Instructions  Overview     Acute sinusitis is an inflammation of the mucous membranes inside the nose and sinuses. Sinuses are the hollow spaces in your skull around the eyes and nose. Acute sinusitis often follows a cold. Acute sinusitis causes thick, discolored mucus that drains from the nose or down the back of the throat. It also can cause pain and pressure in your head and face along with a stuffy or blocked nose.  In most cases, sinusitis gets better on its own in 1 to 2 weeks. But some mild symptoms may last for several weeks. Sometimes antibiotics are needed if there is a bacterial infection.  Follow-up care is a key part of your treatment and safety. Be sure to make and go to all appointments, and call your doctor if you are having problems. It's also a good idea to know your test results and keep a list of the medicines you take.  How can you care for yourself at home?  Use saline (saltwater) nasal washes. This can help keep your nasal passages open and wash out mucus and allergens.  You can buy saline nose washes at a grocery store or drugstore. Follow the instructions on the package.  You can make your own at home. Add 1 teaspoon of non-iodized salt and 1 teaspoon of baking soda to 2 cups of distilled or boiled and cooled water. Fill a squeeze bottle or a nasal cleansing pot (such as a neti pot) with the nasal wash. Then put the tip into your nostril, and lean over the sink. With your mouth open, gently squirt the liquid. Repeat on the other side.  Try a decongestant nasal spray like oxymetazoline (Afrin). Do not use it for more than 3 days in a row. Using it for more than 3 days can make your congestion worse.  If needed, take an over-the-counter pain medicine, such as acetaminophen (Tylenol), ibuprofen (Advil, Motrin), or naproxen (Aleve). Read and follow all instructions on the label.  If the doctor prescribed antibiotics, take them as directed. Do not stop taking them just  "because you feel better. You need to take the full course of antibiotics.  Be careful when taking over-the-counter cold or flu medicines and Tylenol at the same time. Many of these medicines have acetaminophen, which is Tylenol. Read the labels to make sure that you are not taking more than the recommended dose. Too much acetaminophen (Tylenol) can be harmful.  Try a steroid nasal spray. It may help with your symptoms.  Breathe warm, moist air. You can use a steamy shower, a hot bath, or a sink filled with hot water. Avoid cold, dry air. Using a humidifier in your home may help. Follow the directions for cleaning the machine.  When should you call for help?   Call your doctor now or seek immediate medical care if:    You have new or worse swelling, redness, or pain in your face or around one or both of your eyes.     You have double vision or a change in your vision.     You have a high fever.     You have a severe headache and a stiff neck.     You have mental changes, such as feeling confused or much less alert.   Watch closely for changes in your health, and be sure to contact your doctor if:    You are not getting better as expected.   Where can you learn more?  Go to https://www.organgir.am.net/patiented  Enter I933 in the search box to learn more about \"Acute Sinusitis: Care Instructions.\"  Current as of: February 28, 2023               Content Version: 13.8    3588-8262 [employer].   Care instructions adapted under license by your healthcare professional. If you have questions about a medical condition or this instruction, always ask your healthcare professional. [employer] disclaims any warranty or liability for your use of this information.      Dear Shereen Oliver    After reviewing your responses, I've been able to diagnose you with a sinus infection.      Based on your responses and diagnosis, I have prescribed augmentin to treat your symptoms. I have sent this to your pharmacy.? "     It is also important to stay well hydrated, get lots of rest and take over-the-counter decongestants,?tylenol?or ibuprofen if you?are able to?take those medications per your primary care provider to help relieve discomfort.?     It is important that you take?all of?your prescribed medication even if your symptoms are improving after a few doses.? Taking?all of?your medicine helps prevent the symptoms from returning.?     If your symptoms worsen, you develop severe headache, vomiting, high fever (>102), or are not improving in 7 days, please contact your primary care provider for an appointment or visit any of our convenient Walk-in Care or Urgent Care Centers to be seen which can be found on our website?here.?     Thanks again for choosing?us?as your health care partner,?   ?  Rebekah Chavez, HOLLEY?

## 2024-03-22 ENCOUNTER — ANCILLARY PROCEDURE (OUTPATIENT)
Dept: GENERAL RADIOLOGY | Facility: CLINIC | Age: 59
End: 2024-03-22
Attending: FAMILY MEDICINE
Payer: COMMERCIAL

## 2024-03-22 ENCOUNTER — OFFICE VISIT (OUTPATIENT)
Dept: ORTHOPEDICS | Facility: CLINIC | Age: 59
End: 2024-03-22
Payer: COMMERCIAL

## 2024-03-22 DIAGNOSIS — G57.02 PIRIFORMIS SYNDROME OF LEFT SIDE: ICD-10-CM

## 2024-03-22 DIAGNOSIS — M25.552 LEFT HIP PAIN: Primary | ICD-10-CM

## 2024-03-22 DIAGNOSIS — M25.552 LEFT HIP PAIN: ICD-10-CM

## 2024-03-22 DIAGNOSIS — M51.369 LUMBAR DEGENERATIVE DISC DISEASE: ICD-10-CM

## 2024-03-22 DIAGNOSIS — M16.12 PRIMARY OSTEOARTHRITIS OF LEFT HIP: ICD-10-CM

## 2024-03-22 PROCEDURE — 73502 X-RAY EXAM HIP UNI 2-3 VIEWS: CPT | Mod: TC | Performed by: RADIOLOGY

## 2024-03-22 PROCEDURE — 72100 X-RAY EXAM L-S SPINE 2/3 VWS: CPT | Mod: TC | Performed by: RADIOLOGY

## 2024-03-22 PROCEDURE — 99204 OFFICE O/P NEW MOD 45 MIN: CPT | Performed by: FAMILY MEDICINE

## 2024-03-22 RX ORDER — METHYLPREDNISOLONE 4 MG
TABLET, DOSE PACK ORAL
Qty: 21 TABLET | Refills: 0 | Status: SHIPPED | OUTPATIENT
Start: 2024-03-22 | End: 2024-05-15

## 2024-03-22 RX ORDER — METHOCARBAMOL 500 MG/1
500 TABLET, FILM COATED ORAL 2 TIMES DAILY PRN
Qty: 60 TABLET | Refills: 0 | Status: SHIPPED | OUTPATIENT
Start: 2024-03-22 | End: 2024-05-15

## 2024-03-22 NOTE — LETTER
3/22/2024         RE: Shereen Oliver  [address]  [place] [facility] [place] [address]        Dear Colleague,    Thank you for referring your patient, Shereen Oliver, to the [facility] [place]. Please see a copy of my visit note below.    ASSESSMENT & PLAN  Patient Instructions     1. Left hip pain    2. Piriformis syndrome of left side    3. Lumbar degenerative disc disease    4. Primary osteoarthritis of left hip      -Patient has acute left hip pain due to piriformis syndrome, hip arthritis, lumbar degenerative disc disease and arthritis  -Patient cannot take oral anti-inflammatory medications due to previous gastric bypass surgery and history of peptic ulcers  -Patient will start an oral steroid taper tomorrow  -Patient will start Robaxin 500 mg 1-2 times a day as needed for sore and tight muscles  -Patient will start formal physical therapy and home exercise program  -Patient may return to activities as her pain allows  -We also discussed potentially trying a piriformis muscle cortisone injection if symptoms do not improve.  Patient may call us if she would like to consider this in the future  -Patient will follow-up if pain does not improve or worsens  -Call direct clinic number [[phone]] at any time with questions or concerns.    Albert Yeo MD [facility] Orth[facility] and Sports Medicine  [facility] [place]        -----    SUBJECTIVE  Shereen Oliver is a/an 59 year old female who is seen as a self referral for evaluation of left hip pain. The patient is seen by themselves.    Onset: 1 month(s) ago. Reports insidious onset without acute precipitating event.  Location of Pain: left hip  Rating of Pain at worst: 10/10  Rating of Pain Currently: 6/10  Worsened by: sitting for long periods of time,   Better with: biking, ice, chiropractor , pelvic lock belt   Treatments tried: rest/activity avoidance, ice, home exercises, physical therapy ( chiropractic care  (8 visits), and TENS unit   Quality: burning  Associated symptoms: no distal numbness or tingling; denies swelling or warmth  Orthopedic history: NO  Relevant surgical history: YES - neck fusion   Social history: social history: works at [employer]     Past Medical History:   Diagnosis Date     Agoraphobia with panic disorder     elevators and cars     Arthritis      Diabetes mellitus     type 2 resolved after gastric bypass     Disturbance of skin sensation     left leg as residual of neck surgery     Gastro-oesophageal reflux disease      Genital herpes, unspecified      Intrinsic asthma, unspecified     exercise induced     Obesity      Other psoriasis      Spasm of muscle     left leg as residual of neck surgery     Social History     Socioeconomic History     Marital status: Single     Number of children: 0   Occupational History     Employer: [employer]    Tobacco Use     Smoking status: Never     Passive exposure: Never     Smokeless tobacco: Never   Vaping Use     Vaping Use: Never used   Substance and Sexual Activity     Alcohol use: Yes     Comment: seldom     Drug use: No     Sexual activity: Not Currently     Partners: Male     Birth control/protection: None   Other Topics Concern     Parent/sibling w/ CABG, MI or angioplasty before 65F 55M? No         Patient's past medical, surgical, social, and family histories were reviewed today and no changes are noted.    REVIEW OF SYSTEMS:  10 point ROS is negative other than symptoms noted above in HPI, Past Medical History or as stated below  Constitutional: NEGATIVE for fever, chills, change in weight  Skin: NEGATIVE for worrisome rashes, moles or lesions  GI/: NEGATIVE for bowel or bladder changes  Neuro: NEGATIVE for weakness, dizziness or paresthesias    OBJECTIVE:  There were no vitals taken for this visit.   General: healthy, alert and in no distress  HEENT: no scleral icterus or conjunctival erythema  Skin: no suspicious lesions or rash. No  jaundice.  CV: no pedal edema  Resp: normal respiratory effort without conversational dyspnea   Psych: normal mood and affect  Gait: normal steady gait with appropriate coordination and balance  Neuro: Normal light sensory exam of lower extremity  MSK:  LEFT HIP  Inspection:    No obvious deformity or asymmetry, level pelvis  Palpation:    Tender about the adductor group origin. Otherwise all other landmarks are nontender.  Active Range of Motion:     Flexion within normal limits, IR within normal limits, ER  within normal limits  Strength:    Flexion grossly intact, adduction grossly intact, abduction grossly intact  Special Tests:    Positive: none    Negative: Logroll, resisted gluteus medius provocation, SARAH, anterior impingement (FADIR), posterior impingement (EX/AB/ER)    THORACIC/LUMBAR SPINE  Inspection:    No redness, swelling, overlying skin change, gross deformity/asymmetry, scapular winging  Palpation:    Tender about the lumbar spinous processes and right SI joint. Otherwise remainder of landmarks are nontender.  Range of Motion:     Lumbar flexion within normal limits    Lumbar extension within normal limits    Right side bend within normal limits    Left side bend within normal limits    Right rotation within normal limits    Left rotation within normal limits  Strength:    Full strength throughout hips/quads/hamstrings  Special Tests:    Positive: none  Negative: straight leg raise (bilateral), slump test (bilateral)      Independent visualization of the below image:  Recent Results (from the past 24 hour(s))   XR Pelvis and Hip Left 1 View    Narrative    XR PELVIS AND HIP LEFT 1 VIEW   3/22/2024 3:31 PM     HISTORY:  Left hip pain    Comparison: None.      Impression    IMPRESSION: Degenerative changes in the visualized spine. There is a  normal appearance to the hips. Mild degenerative changes in the SI  joints and pubic symphysis. There is no evidence of fracture or  osteonecrosis.    SLICK KC  MD ANITRA         SYSTEM ID:  [id number]   XR Lumbar Spine 2/3 Views    Narrative    XR LUMBAR SPINE 2/3 VIEWS  3/22/2024 3:31 PM     HISTORY: Left hip pain    COMPARISON: None.       Impression    IMPRESSION: There is anterolisthesis at L4-5. No loss of vertebral  body height. Multilevel degenerative changes with loss of disc height,  osteophyte formation and facet hypertrophy.     DELANEY MONROY MD         SYSTEM ID:  [id number]           Albert Yeo MD [facility]      Again, thank you for allowing me to participate in the care of your patient.        Sincerely,        Albert Yeo, MD

## 2024-03-22 NOTE — PROGRESS NOTES
ASSESSMENT & PLAN  Patient Instructions     1. Left hip pain    2. Piriformis syndrome of left side    3. Lumbar degenerative disc disease    4. Primary osteoarthritis of left hip      -Patient has acute left hip pain due to piriformis syndrome, hip arthritis, lumbar degenerative disc disease and arthritis  -Patient cannot take oral anti-inflammatory medications due to previous gastric bypass surgery and history of peptic ulcers  -Patient will start an oral steroid taper tomorrow  -Patient will start Robaxin 500 mg 1-2 times a day as needed for sore and tight muscles  -Patient will start formal physical therapy and home exercise program  -Patient may return to activities as her pain allows  -We also discussed potentially trying a piriformis muscle cortisone injection if symptoms do not improve.  Patient may call us if she would like to consider this in the future  -Patient will follow-up if pain does not improve or worsens  -Call direct clinic number [[phone]] at any time with questions or concerns.    Albert Yeo MD [facility] Orthopedics and Sports Medicine  [facility]        -----    SUBJECTIVE  Shereen Oliver is a/an 59 year old female who is seen as a self referral for evaluation of left hip pain. The patient is seen by themselves.    Onset: 1 month(s) ago. Reports insidious onset without acute precipitating event.  Location of Pain: left hip  Rating of Pain at worst: 10/10  Rating of Pain Currently: 6/10  Worsened by: sitting for long periods of time,   Better with: biking, ice, chiropractor , pelvic lock belt   Treatments tried: rest/activity avoidance, ice, home exercises, physical therapy ( chiropractic care (8 visits), and TENS unit   Quality: burning  Associated symptoms: no distal numbness or tingling; denies swelling or warmth  Orthopedic history: NO  Relevant surgical history: YES - neck fusion   Social history: social history: works at [employer]     Past Medical History:    Diagnosis Date    Agoraphobia with panic disorder     elevators and cars    Arthritis     Diabetes mellitus     type 2 resolved after gastric bypass    Disturbance of skin sensation     left leg as residual of neck surgery    Gastro-oesophageal reflux disease     Genital herpes, unspecified     Intrinsic asthma, unspecified     exercise induced    Obesity     Other psoriasis     Spasm of muscle     left leg as residual of neck surgery     Social History     Socioeconomic History    Marital status: Single    Number of children: 0   Occupational History     Employer: [employer]    Tobacco Use    Smoking status: Never     Passive exposure: Never    Smokeless tobacco: Never   Vaping Use    Vaping Use: Never used   Substance and Sexual Activity    Alcohol use: Yes     Comment: seldom    Drug use: No    Sexual activity: Not Currently     Partners: Male     Birth control/protection: None   Other Topics Concern    Parent/sibling w/ CABG, MI or angioplasty before 65F 55M? No         Patient's past medical, surgical, social, and family histories were reviewed today and no changes are noted.    REVIEW OF SYSTEMS:  10 point ROS is negative other than symptoms noted above in HPI, Past Medical History or as stated below  Constitutional: NEGATIVE for fever, chills, change in weight  Skin: NEGATIVE for worrisome rashes, moles or lesions  GI/: NEGATIVE for bowel or bladder changes  Neuro: NEGATIVE for weakness, dizziness or paresthesias    OBJECTIVE:  There were no vitals taken for this visit.   General: healthy, alert and in no distress  HEENT: no scleral icterus or conjunctival erythema  Skin: no suspicious lesions or rash. No jaundice.  CV: no pedal edema  Resp: normal respiratory effort without conversational dyspnea   Psych: normal mood and affect  Gait: normal steady gait with appropriate coordination and balance  Neuro: Normal light sensory exam of lower extremity  MSK:  LEFT HIP  Inspection:    No obvious deformity or  asymmetry, level pelvis  Palpation:    Tender about the adductor group origin. Otherwise all other landmarks are nontender.  Active Range of Motion:     Flexion within normal limits, IR within normal limits, ER  within normal limits  Strength:    Flexion grossly intact, adduction grossly intact, abduction grossly intact  Special Tests:    Positive: none    Negative: Logroll, resisted gluteus medius provocation, SARAH, anterior impingement (FADIR), posterior impingement (EX/AB/ER)    THORACIC/LUMBAR SPINE  Inspection:    No redness, swelling, overlying skin change, gross deformity/asymmetry, scapular winging  Palpation:    Tender about the lumbar spinous processes and right SI joint. Otherwise remainder of landmarks are nontender.  Range of Motion:     Lumbar flexion within normal limits    Lumbar extension within normal limits    Right side bend within normal limits    Left side bend within normal limits    Right rotation within normal limits    Left rotation within normal limits  Strength:    Full strength throughout hips/quads/hamstrings  Special Tests:    Positive: none  Negative: straight leg raise (bilateral), slump test (bilateral)      Independent visualization of the below image:  Recent Results (from the past 24 hour(s))   XR Pelvis and Hip Left 1 View    Narrative    XR PELVIS AND HIP LEFT 1 VIEW   3/22/2024 3:31 PM     HISTORY:  Left hip pain    Comparison: None.      Impression    IMPRESSION: Degenerative changes in the visualized spine. There is a  normal appearance to the hips. Mild degenerative changes in the SI  joints and pubic symphysis. There is no evidence of fracture or  osteonecrosis.    SLICK AYOUB MD         SYSTEM ID:  [id number]   XR Lumbar Spine 2/3 Views    Narrative    XR LUMBAR SPINE 2/3 VIEWS  3/22/2024 3:31 PM     HISTORY: Left hip pain    COMPARISON: None.       Impression    IMPRESSION: There is anterolisthesis at L4-5. No loss of vertebral  body height. Multilevel degenerative  changes with loss of disc height,  osteophyte formation and facet hypertrophy.     DELANEY MONROY MD         SYSTEM ID:  [id number]           Albert Yeo MD [facility] Sports and Orthopedic Care

## 2024-03-27 ENCOUNTER — THERAPY VISIT (OUTPATIENT)
Dept: PHYSICAL THERAPY | Facility: CLINIC | Age: 59
End: 2024-03-27
Attending: FAMILY MEDICINE
Payer: COMMERCIAL

## 2024-03-27 DIAGNOSIS — M25.552 LEFT HIP PAIN: ICD-10-CM

## 2024-03-27 DIAGNOSIS — M51.369 DDD (DEGENERATIVE DISC DISEASE), LUMBAR: ICD-10-CM

## 2024-03-27 DIAGNOSIS — M25.552 HIP PAIN, LEFT: Primary | ICD-10-CM

## 2024-03-27 DIAGNOSIS — M16.12 PRIMARY OSTEOARTHRITIS OF LEFT HIP: ICD-10-CM

## 2024-03-27 DIAGNOSIS — M51.369 LUMBAR DEGENERATIVE DISC DISEASE: ICD-10-CM

## 2024-03-27 DIAGNOSIS — G57.02 PIRIFORMIS SYNDROME OF LEFT SIDE: ICD-10-CM

## 2024-03-27 PROCEDURE — 97110 THERAPEUTIC EXERCISES: CPT | Mod: GP

## 2024-03-27 PROCEDURE — 97530 THERAPEUTIC ACTIVITIES: CPT | Mod: GP

## 2024-03-27 PROCEDURE — 97161 PT EVAL LOW COMPLEX 20 MIN: CPT | Mod: GP

## 2024-03-27 PROCEDURE — 97140 MANUAL THERAPY 1/> REGIONS: CPT | Mod: GP

## 2024-03-27 NOTE — PROGRESS NOTES
PHYSICAL THERAPY EVALUATION  Type of Visit: Evaluation    See electronic medical record for Abuse and Falls Screening details.    Subjective       Presenting condition or subjective complaint: piriformis syndrome  Date of onset: 03/22/24    Relevant medical history: Arthritis; Foot drop; Neck injury; Overweight   Dates & types of surgery: C4-5, 5-6 discectomy fusion 2001, cholecystectomy 2006    Prior diagnostic imaging/testing results: X-ray     Prior therapy history for the same diagnosis, illness or injury: Yes Sciatica PT    Living Environment  Social support: Alone   Type of home: Apartment/condo   Stairs to enter the home: No       Ramp: No   Stairs inside the home: Yes 16 Is there a railing: Yes   Help at home: None  Equipment owned:       Employment: Yes [employer]  Hobbies/Interests: Cycling, walking, [employer] boxing, dancing    Patient goals for therapy: Sit pain free, get back to cycling pain free and if possible doing [employer] occulus boxing       Objective   HIP EVALUATION  PAIN: Pain is Exacerbated By: biking, sitting  Pain is Relieved By: cold, heat, NSAIDs, otc medications, rest, stretch, and use  POSTURE: WFL  WEIGHTBEARING ALIGNMENT: Lumbar/Pelvic WB Alignment:elevation of left SIJ w/ lumbar flexion  ROM: AROM WNL  PELVIC/SI SCREEN: Standing Forward Bend: left SIJ upslip  STRENGTH: WNL  R hip strength greater than left, but WFL for both    Assessment & Plan   CLINICAL IMPRESSIONS  Medical Diagnosis: Left hip pain  Piriformis syndrome of left side  Lumbar degenerative disc disease  Primary osteoarthritis of left hip    Treatment Diagnosis: Left lower extremity pain, back pain with radiating pain   Impression/Assessment: Patient is a 59 year old female with left hip pain complaints.  The following significant findings have been identified: Pain, Decreased strength, Impaired muscle performance, Decreased activity tolerance, and Instability. These impairments interfere with their ability to perform  self care tasks, work tasks, recreational activities, household chores, household mobility, and community mobility as compared to previous level of function.     Clinical Decision Making (Complexity):  Clinical Presentation: Stable/Uncomplicated  Clinical Presentation Rationale: based on medical and personal factors listed in PT evaluation  Clinical Decision Making (Complexity): Low complexity    PLAN OF CARE  Treatment Interventions:  Interventions: Gait Training, Manual Therapy, Neuromuscular Re-education, Therapeutic Activity, Therapeutic Exercise    Long Term Goals     PT Goal 1  Goal Identifier: Goal 1  Goal Description: Pt will be able to squat to reach floor w/o pain in order to be able to get on and off the ground without pain.  Rationale: to maximize safety and independence with performance of ADLs and functional tasks  Target Date: 05/22/24  PT Goal 2  Goal Identifier: Goal 2  Goal Description: The patient will be able to ascend and descend stairs w/o pain in order to maximize community mobility and facilitate a healthy lifestyle.  Rationale: to maximize safety and independence within the community  Target Date: 05/22/24  PT Goal 3  Goal Identifier: Goal 3  Goal Description: The pt will be able to walk for 30 minutes without pain in order to facilitate a healthy, active lifestyle.  Rationale: to maximize safety and independence within the home;to maximize safety and independence within the community;to maximize safety and independence with transportation  Target Date: 05/22/24      Frequency of Treatment: 1 x per week  Duration of Treatment: 8 weeks    Education Assessment:   Learner/Method: Patient;Pictures/Video;No Barriers to Learning;Demonstration;Listening;Reading    Risks and benefits of evaluation/treatment have been explained.   Patient/Family/caregiver agrees with Plan of Care.     Evaluation Time:      Signing Clinician: Rosa Barbosa PT

## 2024-03-28 ENCOUNTER — HOSPITAL ENCOUNTER (OUTPATIENT)
Dept: MAMMOGRAPHY | Facility: CLINIC | Age: 59
Discharge: HOME | End: 2024-03-28
Attending: INTERNAL MEDICINE | Admitting: INTERNAL MEDICINE
Payer: COMMERCIAL

## 2024-03-28 DIAGNOSIS — Z12.31 VISIT FOR SCREENING MAMMOGRAM: ICD-10-CM

## 2024-03-28 PROCEDURE — 77063 BREAST TOMOSYNTHESIS BI: CPT

## 2024-04-12 ENCOUNTER — THERAPY VISIT (OUTPATIENT)
Dept: PHYSICAL THERAPY | Facility: CLINIC | Age: 59
End: 2024-04-12
Attending: FAMILY MEDICINE
Payer: COMMERCIAL

## 2024-04-12 DIAGNOSIS — M25.552 LEFT HIP PAIN: Primary | ICD-10-CM

## 2024-04-12 PROCEDURE — 97530 THERAPEUTIC ACTIVITIES: CPT | Mod: GP

## 2024-04-12 PROCEDURE — 97140 MANUAL THERAPY 1/> REGIONS: CPT | Mod: GP

## 2024-04-15 DIAGNOSIS — G62.9 NEUROPATHY: ICD-10-CM

## 2024-04-15 RX ORDER — GABAPENTIN 100 MG/1
CAPSULE ORAL
Qty: 180 CAPSULE | Refills: 3 | Status: SHIPPED | OUTPATIENT
Start: 2024-04-15 | End: 2024-05-15

## 2024-04-19 ENCOUNTER — OFFICE VISIT (OUTPATIENT)
Dept: ORTHOPEDICS | Facility: CLINIC | Age: 59
End: 2024-04-19
Attending: FAMILY MEDICINE
Payer: COMMERCIAL

## 2024-04-19 DIAGNOSIS — G57.02 PIRIFORMIS SYNDROME OF LEFT SIDE: Primary | ICD-10-CM

## 2024-04-19 DIAGNOSIS — M25.552 LEFT HIP PAIN: ICD-10-CM

## 2024-04-19 PROCEDURE — 20552 NJX 1/MLT TRIGGER POINT 1/2: CPT | Mod: LT | Performed by: STUDENT IN AN ORGANIZED HEALTH CARE EDUCATION/TRAINING PROGRAM

## 2024-04-19 PROCEDURE — 76942 ECHO GUIDE FOR BIOPSY: CPT | Mod: LT | Performed by: STUDENT IN AN ORGANIZED HEALTH CARE EDUCATION/TRAINING PROGRAM

## 2024-04-19 PROCEDURE — 99207 PR NO BILLABLE SERVICE THIS VISIT: CPT | Mod: 25 | Performed by: STUDENT IN AN ORGANIZED HEALTH CARE EDUCATION/TRAINING PROGRAM

## 2024-04-19 RX ORDER — TRIAMCINOLONE ACETONIDE 40 MG/ML
40 INJECTION, SUSPENSION INTRA-ARTICULAR; INTRAMUSCULAR
Status: SHIPPED | OUTPATIENT
Start: 2024-04-19

## 2024-04-19 RX ORDER — LIDOCAINE HYDROCHLORIDE 10 MG/ML
6 INJECTION, SOLUTION INFILTRATION; PERINEURAL
Status: SHIPPED | OUTPATIENT
Start: 2024-04-19

## 2024-04-19 RX ADMIN — TRIAMCINOLONE ACETONIDE 40 MG: 40 INJECTION, SUSPENSION INTRA-ARTICULAR; INTRAMUSCULAR at 08:29

## 2024-04-19 RX ADMIN — LIDOCAINE HYDROCHLORIDE 6 ML: 10 INJECTION, SOLUTION INFILTRATION; PERINEURAL at 08:29

## 2024-04-19 NOTE — PATIENT INSTRUCTIONS
1. Piriformis syndrome of left side    2. Left hip pain        Plan:  -Left Piriformis injection performed in clinic today  -Follow-up with Dr. Yeo as scheduled    If you have any questions or concerns after your appointment, please send a [employer] message or call the clinic at [phone]    Heidi Caceres DO, CAQSM  [facility]  Sports Medicine    Thank you for choosing [facility]!    DR. CACERES'S CLINIC LOCATIONS:     [place]  TRIAGE LINE: [phone] [address] APPOINTMENTS: [phone]   [place] [address] RADIOLOGY: [phone]   (Mondays & Tuesdays) HAND THERAPY: [phone]    PHYSICAL THERAPY: [phone]   [place] BILLING QUESTIONS: [phone] [address] FAX: [phone]   [place] [address]    (Thursdays & Fridays)       Post-Injection Discharge Instructions    You may shower, however avoid swimming, tub baths or hot tubs for 24 hours following your procedure  You may have a mild to moderate increase in pain for a few days following the injection.  The lidocaine (local numbing medicine) will wear off in several hours. It usually takes 3-5 days for the steroid medication to start working although it may take up to 14 days for full effect.   You may use ice packs for 10-15 minutes, 3 to 4 times a day at the injection site for comfort if needed  You may use extra strength Tylenol for pain control if necessary   If you were fasting, you may resume your normal diet and medications after the procedure  If you have diabetes, your blood sugar may be higher than normal for 10-14 days following a steroid injection. Contact your doctor who manages your diabetes if your blood sugar is significantly higher than usual    If you experience any of the following, call Sports Medicine @ [phone] or [phone]  -Fever over 100 degrees F  -Swelling, bleeding, redness, drainage, warmth at the injection site  -New or  significant worsening pain

## 2024-04-19 NOTE — LETTER
4/19/2024         RE: Shereen Oliver  [address]  [place] [id number]        Dear Colleague,    Thank you for referring your patient, Shereen Oliver, to the [facility] [place]. Please see a copy of my visit note below.    Sports Medicine Procedure Note    Shereen was seen today for pain.    Diagnoses and all orders for this visit:    Piriformis syndrome of left side  -     Orthopedic  Referral  -     Left Piriformis Steroid Injection    Left hip pain        Shereen lOiver is a/an 59 year old female who is seen for a Corticosteroid injection of left piriformis muscle.   Last injection: N/A    -Left Piriformis injection performed in clinic today, pain directly over sciatic nerve with sonopalpation   -Follow-up with Dr. Yeo as scheduled  -Post-injection instructions were provided to the patient  -Patient did report post-procedure improvement in pain  -Discussed with patient that we did put some lidocaine just superior to her sciatic nerve, so she will likely have some weakness and numbness that will resolve after local anesthetic wears off    Return with Dr. Yeo as scheduled.      Post-Injection Discharge Instructions    You may shower, however avoid swimming, tub baths or hot tubs for 24 hours following your procedure  You may have a mild to moderate increase in pain for a few days following the injection.  The lidocaine (local numbing medicine) will wear off in several hours. It usually takes 3-5 days for the steroid medication to start working although it may take up to 14 days for full effect.   You may use ice packs for 10-15 minutes, 3 to 4 times a day at the injection site for comfort if needed  You may use extra strength Tylenol for pain control if necessary   If you were fasting, you may resume your normal diet and medications after the procedure  If you have diabetes, your blood sugar may be higher than normal for 10-14 days following a steroid  injection. Contact your doctor who manages your diabetes if your blood sugar is significantly higher than usual    If you experience any of the following, call Sports Medicine @ [phone] or [phone]  -Fever over 100 degrees F  -Swelling, bleeding, redness, drainage, warmth at the injection site  -New or significant worsening pain    Left Piriformis Steroid Injection    Date/Time: 4/19/2024 8:29 AM    Performed by: Heidi Caceres DO  Authorized by: Heidi Caceres DO    Indications:  Pain  Needle Size:  25 G  Guidance: ultrasound    Approach:  Anterior   Location comment:  Left Piriformis      Medications:  40 mg triamcinolone 40 MG/ML; 6 mL lidocaine 1 %  Outcome:  Tolerated well, no immediate complications  Procedure discussed: discussed risks, benefits, and alternatives    Consent Given by:  Patient  Timeout: timeout called immediately prior to procedure    Prep: patient was prepped and draped in usual sterile fashion     Ultrasound was used to ensure safe and accurate needle placement and injection. Ultrasound images of the procedure were permanently stored. 1ml of 8.4% Sodium Bicarbonate solution was used to buffer the local numbing agent for today's injection            Dr. Heidi Caceres DO  [facility]  Sports Medicine     -----      Again, thank you for allowing me to participate in the care of your patient.        Sincerely,        Heidi Caceres DO

## 2024-04-19 NOTE — PROGRESS NOTES
Sports Medicine Procedure Note    Shereen was seen today for pain.    Diagnoses and all orders for this visit:    Piriformis syndrome of left side  -     Orthopedic  Referral  -     Left Piriformis Steroid Injection    Left hip pain        Shereen Oliver is a/an 59 year old female who is seen for a Corticosteroid injection of left piriformis muscle.   Last injection: N/A    -Left Piriformis injection performed in clinic today, pain directly over sciatic nerve with sonopalpation   -Follow-up with Dr. Yeo as scheduled  -Post-injection instructions were provided to the patient  -Patient did report post-procedure improvement in pain  -Discussed with patient that we did put some lidocaine just superior to her sciatic nerve, so she will likely have some weakness and numbness that will resolve after local anesthetic wears off    Return with Dr. Yeo as scheduled.      Post-Injection Discharge Instructions    You may shower, however avoid swimming, tub baths or hot tubs for 24 hours following your procedure  You may have a mild to moderate increase in pain for a few days following the injection.  The lidocaine (local numbing medicine) will wear off in several hours. It usually takes 3-5 days for the steroid medication to start working although it may take up to 14 days for full effect.   You may use ice packs for 10-15 minutes, 3 to 4 times a day at the injection site for comfort if needed  You may use extra strength Tylenol for pain control if necessary   If you were fasting, you may resume your normal diet and medications after the procedure  If you have diabetes, your blood sugar may be higher than normal for 10-14 days following a steroid injection. Contact your doctor who manages your diabetes if your blood sugar is significantly higher than usual    If you experience any of the following, call Sports Medicine @ [phone] or [phone]  -Fever over 100 degrees F  -Swelling, bleeding, redness, drainage, warmth  at the injection site  -New or significant worsening pain    Left Piriformis Steroid Injection    Date/Time: 4/19/2024 8:29 AM    Performed by: Heidi Caceres DO  Authorized by: Heidi Caceres DO    Indications:  Pain  Needle Size:  25 G  Guidance: ultrasound    Approach:  Anterior   Location comment:  Left Piriformis      Medications:  40 mg triamcinolone 40 MG/ML; 6 mL lidocaine 1 %  Outcome:  Tolerated well, no immediate complications  Procedure discussed: discussed risks, benefits, and alternatives    Consent Given by:  Patient  Timeout: timeout called immediately prior to procedure    Prep: patient was prepped and draped in usual sterile fashion     Ultrasound was used to ensure safe and accurate needle placement and injection. Ultrasound images of the procedure were permanently stored. 1ml of 8.4% Sodium Bicarbonate solution was used to buffer the local numbing agent for today's injection            Dr. Heidi Caceres DO  [facility]  Sports Medicine     -----

## 2024-04-24 ENCOUNTER — THERAPY VISIT (OUTPATIENT)
Dept: PHYSICAL THERAPY | Facility: CLINIC | Age: 59
End: 2024-04-24
Payer: COMMERCIAL

## 2024-04-24 DIAGNOSIS — G57.02 PIRIFORMIS SYNDROME OF LEFT SIDE: Primary | ICD-10-CM

## 2024-04-24 PROCEDURE — 97530 THERAPEUTIC ACTIVITIES: CPT | Mod: GP

## 2024-04-24 PROCEDURE — 97110 THERAPEUTIC EXERCISES: CPT | Mod: 59

## 2024-05-02 ENCOUNTER — MYC MEDICAL ADVICE (OUTPATIENT)
Dept: ORTHOPEDICS | Facility: CLINIC | Age: 59
End: 2024-05-02

## 2024-05-02 ENCOUNTER — THERAPY VISIT (OUTPATIENT)
Dept: PHYSICAL THERAPY | Facility: CLINIC | Age: 59
End: 2024-05-02
Attending: FAMILY MEDICINE
Payer: COMMERCIAL

## 2024-05-02 DIAGNOSIS — M16.12 PRIMARY OSTEOARTHRITIS OF LEFT HIP: ICD-10-CM

## 2024-05-02 DIAGNOSIS — M25.552 LEFT HIP PAIN: ICD-10-CM

## 2024-05-02 DIAGNOSIS — G57.02 PIRIFORMIS SYNDROME OF LEFT SIDE: Primary | ICD-10-CM

## 2024-05-02 PROCEDURE — 97140 MANUAL THERAPY 1/> REGIONS: CPT | Mod: GP

## 2024-05-02 PROCEDURE — 97530 THERAPEUTIC ACTIVITIES: CPT | Mod: GP

## 2024-05-02 NOTE — TELEPHONE ENCOUNTER
Patient was last seen by Dr. Yeo on 3/22/24. She had left piriformis injection with Dr. Caceres on 4/19/24.     Please see patient update via MyChart and advise on recommendations.     Upon review, patient cancelled spine consultation with Dr. Purcell scheduled for 5/31/24.     Sandy Glez ATC

## 2024-05-13 ENCOUNTER — MYC MEDICAL ADVICE (OUTPATIENT)
Dept: PHYSICAL THERAPY | Facility: CLINIC | Age: 59
End: 2024-05-13

## 2024-05-14 SDOH — HEALTH STABILITY: PHYSICAL HEALTH: ON AVERAGE, HOW MANY DAYS PER WEEK DO YOU ENGAGE IN MODERATE TO STRENUOUS EXERCISE (LIKE A BRISK WALK)?: 5 DAYS

## 2024-05-14 SDOH — HEALTH STABILITY: PHYSICAL HEALTH: ON AVERAGE, HOW MANY MINUTES DO YOU ENGAGE IN EXERCISE AT THIS LEVEL?: 40 MIN

## 2024-05-14 ASSESSMENT — ANXIETY QUESTIONNAIRES
2. NOT BEING ABLE TO STOP OR CONTROL WORRYING: SEVERAL DAYS
5. BEING SO RESTLESS THAT IT IS HARD TO SIT STILL: SEVERAL DAYS
4. TROUBLE RELAXING: SEVERAL DAYS
7. FEELING AFRAID AS IF SOMETHING AWFUL MIGHT HAPPEN: NOT AT ALL
6. BECOMING EASILY ANNOYED OR IRRITABLE: NOT AT ALL
IF YOU CHECKED OFF ANY PROBLEMS ON THIS QUESTIONNAIRE, HOW DIFFICULT HAVE THESE PROBLEMS MADE IT FOR YOU TO DO YOUR WORK, TAKE CARE OF THINGS AT HOME, OR GET ALONG WITH OTHER PEOPLE: NOT DIFFICULT AT ALL
8. IF YOU CHECKED OFF ANY PROBLEMS, HOW DIFFICULT HAVE THESE MADE IT FOR YOU TO DO YOUR WORK, TAKE CARE OF THINGS AT HOME, OR GET ALONG WITH OTHER PEOPLE?: NOT DIFFICULT AT ALL
GAD7 TOTAL SCORE: 4
7. FEELING AFRAID AS IF SOMETHING AWFUL MIGHT HAPPEN: NOT AT ALL
GAD7 TOTAL SCORE: 4
1. FEELING NERVOUS, ANXIOUS, OR ON EDGE: SEVERAL DAYS
GAD7 TOTAL SCORE: 4
3. WORRYING TOO MUCH ABOUT DIFFERENT THINGS: NOT AT ALL

## 2024-05-14 ASSESSMENT — PATIENT HEALTH QUESTIONNAIRE - PHQ9
10. IF YOU CHECKED OFF ANY PROBLEMS, HOW DIFFICULT HAVE THESE PROBLEMS MADE IT FOR YOU TO DO YOUR WORK, TAKE CARE OF THINGS AT HOME, OR GET ALONG WITH OTHER PEOPLE: NOT DIFFICULT AT ALL
SUM OF ALL RESPONSES TO PHQ QUESTIONS 1-9: 0
SUM OF ALL RESPONSES TO PHQ QUESTIONS 1-9: 0

## 2024-05-14 ASSESSMENT — SOCIAL DETERMINANTS OF HEALTH (SDOH): HOW OFTEN DO YOU GET TOGETHER WITH FRIENDS OR RELATIVES?: ONCE A WEEK

## 2024-05-15 ENCOUNTER — OFFICE VISIT (OUTPATIENT)
Dept: INTERNAL MEDICINE | Facility: CLINIC | Age: 59
End: 2024-05-15
Payer: COMMERCIAL

## 2024-05-15 VITALS
BODY MASS INDEX: 29.19 KG/M2 | TEMPERATURE: 98.4 F | HEART RATE: 80 BPM | SYSTOLIC BLOOD PRESSURE: 112 MMHG | RESPIRATION RATE: 16 BRPM | HEIGHT: 67 IN | DIASTOLIC BLOOD PRESSURE: 70 MMHG | OXYGEN SATURATION: 100 % | WEIGHT: 186 LBS

## 2024-05-15 DIAGNOSIS — H93.13 TINNITUS, BILATERAL: ICD-10-CM

## 2024-05-15 DIAGNOSIS — Z00.00 ROUTINE GENERAL MEDICAL EXAMINATION AT A HEALTH CARE FACILITY: Primary | ICD-10-CM

## 2024-05-15 DIAGNOSIS — B00.9 HERPES SIMPLEX VIRUS INFECTION: ICD-10-CM

## 2024-05-15 DIAGNOSIS — Z23 NEED FOR TDAP VACCINATION: ICD-10-CM

## 2024-05-15 DIAGNOSIS — J45.20 MILD INTERMITTENT ASTHMA WITHOUT COMPLICATION: ICD-10-CM

## 2024-05-15 DIAGNOSIS — R21 RASH: ICD-10-CM

## 2024-05-15 DIAGNOSIS — J32.1 CHRONIC FRONTAL SINUSITIS: ICD-10-CM

## 2024-05-15 DIAGNOSIS — M25.552 PAIN IN JOINT INVOLVING LEFT PELVIC REGION AND THIGH: ICD-10-CM

## 2024-05-15 DIAGNOSIS — E78.5 HYPERLIPIDEMIA, UNSPECIFIED HYPERLIPIDEMIA TYPE: ICD-10-CM

## 2024-05-15 DIAGNOSIS — G62.9 NEUROPATHY: ICD-10-CM

## 2024-05-15 LAB
BASOPHILS # BLD AUTO: 0 10E3/UL (ref 0–0.2)
BASOPHILS NFR BLD AUTO: 1 %
EOSINOPHIL # BLD AUTO: 0.2 10E3/UL (ref 0–0.7)
EOSINOPHIL NFR BLD AUTO: 4 %
ERYTHROCYTE [DISTWIDTH] IN BLOOD BY AUTOMATED COUNT: 12.6 % (ref 10–15)
HCT VFR BLD AUTO: 40.7 % (ref 35–47)
HGB BLD-MCNC: 13.9 G/DL (ref 11.7–15.7)
IMM GRANULOCYTES # BLD: 0 10E3/UL
IMM GRANULOCYTES NFR BLD: 0 %
LYMPHOCYTES # BLD AUTO: 1.1 10E3/UL (ref 0.8–5.3)
LYMPHOCYTES NFR BLD AUTO: 31 %
MCH RBC QN AUTO: 30.2 PG (ref 26.5–33)
MCHC RBC AUTO-ENTMCNC: 34.2 G/DL (ref 31.5–36.5)
MCV RBC AUTO: 88 FL (ref 78–100)
MONOCYTES # BLD AUTO: 0.3 10E3/UL (ref 0–1.3)
MONOCYTES NFR BLD AUTO: 8 %
NEUTROPHILS # BLD AUTO: 2 10E3/UL (ref 1.6–8.3)
NEUTROPHILS NFR BLD AUTO: 56 %
PLATELET # BLD AUTO: 126 10E3/UL (ref 150–450)
RBC # BLD AUTO: 4.61 10E6/UL (ref 3.8–5.2)
WBC # BLD AUTO: 3.6 10E3/UL (ref 4–11)

## 2024-05-15 PROCEDURE — 80053 COMPREHEN METABOLIC PANEL: CPT | Performed by: NURSE PRACTITIONER

## 2024-05-15 PROCEDURE — 80061 LIPID PANEL: CPT | Performed by: NURSE PRACTITIONER

## 2024-05-15 PROCEDURE — 84443 ASSAY THYROID STIM HORMONE: CPT | Performed by: NURSE PRACTITIONER

## 2024-05-15 PROCEDURE — 90471 IMMUNIZATION ADMIN: CPT | Performed by: NURSE PRACTITIONER

## 2024-05-15 PROCEDURE — 90715 TDAP VACCINE 7 YRS/> IM: CPT | Performed by: NURSE PRACTITIONER

## 2024-05-15 PROCEDURE — 85025 COMPLETE CBC W/AUTO DIFF WBC: CPT | Performed by: NURSE PRACTITIONER

## 2024-05-15 PROCEDURE — 99213 OFFICE O/P EST LOW 20 MIN: CPT | Mod: 25 | Performed by: NURSE PRACTITIONER

## 2024-05-15 PROCEDURE — 99396 PREV VISIT EST AGE 40-64: CPT | Mod: 25 | Performed by: NURSE PRACTITIONER

## 2024-05-15 PROCEDURE — 36415 COLL VENOUS BLD VENIPUNCTURE: CPT | Performed by: NURSE PRACTITIONER

## 2024-05-15 RX ORDER — FLUTICASONE PROPIONATE 50 MCG
1 SPRAY, SUSPENSION (ML) NASAL DAILY
Qty: 16 G | Refills: 11 | Status: SHIPPED | OUTPATIENT
Start: 2024-05-15

## 2024-05-15 RX ORDER — GABAPENTIN 100 MG/1
200 CAPSULE ORAL 3 TIMES DAILY
Qty: 540 CAPSULE | Refills: 3 | Status: SHIPPED | OUTPATIENT
Start: 2024-05-15

## 2024-05-15 RX ORDER — ALBUTEROL SULFATE 90 UG/1
2 AEROSOL, METERED RESPIRATORY (INHALATION) EVERY 4 HOURS PRN
Qty: 18 G | Refills: 4 | Status: SHIPPED | OUTPATIENT
Start: 2024-05-15

## 2024-05-15 RX ORDER — ACYCLOVIR 400 MG/1
400 TABLET ORAL 3 TIMES DAILY
Qty: 21 TABLET | Refills: 5 | Status: SHIPPED | OUTPATIENT
Start: 2024-05-15

## 2024-05-15 NOTE — NURSING NOTE
"Chief Complaint   Patient presents with    Physical     Fasting     initial /70   Pulse 80   Temp 98.4  F (36.9  C) (Oral)   Resp 16   Ht 1.702 m (5' 7\")   Wt 84.4 kg (186 lb)   LMP  (LMP Unknown)   SpO2 100%   BMI 29.13 kg/m   Estimated body mass index is 29.13 kg/m  as calculated from the following:    Height as of this encounter: 1.702 m (5' 7\").    Weight as of this encounter: 84.4 kg (186 lb)..  bp completed using cuff size large  HOLA BAXTER LPN  "

## 2024-05-15 NOTE — PROGRESS NOTES
Answers submitted by the patient for this visit:  Patient Health Questionnaire (Submitted on 5/14/2024)  If you checked off any problems, how difficult have these problems made it for you to do your work, take care of things at home, or get along with other people?: Not difficult at all  PHQ9 TOTAL SCORE: 0  GIOVANNI-7 (Submitted on 5/14/2024)  GIOVANNI 7 TOTAL SCORE: 4  Preventive Care Visit  [facility]  ABISAI Zhang CNP, Internal Medicine  May 15, 2024      Assessment & Plan     Routine general medical examination at a health care facility    - Lipid panel reflex to direct LDL Fasting; Future  - Comprehensive metabolic panel (BMP + Alb, Alk Phos, ALT, AST, Total. Bili, TP); Future  - TSH with free T4 reflex; Future  - CBC with platelets and differential; Future    Hyperlipidemia  Labs    Chronic frontal sinusitis  Uses during the summer mostly  - fluticasone (FLONASE) 50 MCG/ACT nasal spray; Spray 1 spray into both nostrils daily    Tinnitus, bilateral  Helped during the summer  - fluticasone (FLONASE) 50 MCG/ACT nasal spray; Spray 1 spray into both nostrils daily    Neuropathy  Would like to increase the dose of the gabapentin and be able to take 1 or 2 tabs up to 3 times a day  It does seem to help her with her discomfort  - gabapentin (NEURONTIN) 100 MG capsule; Take 2 capsules (200 mg) by mouth 3 times daily    Mild intermittent asthma without complication  Stable and uses very sparingly  - albuterol (PROAIR HFA/PROVENTIL HFA/VENTOLIN HFA) 108 (90 Base) MCG/ACT inhaler; Inhale 2 puffs into the lungs every 4 hours as needed for shortness of breath    Rash  Has a history of genital herpes and this medication works well  - acyclovir (ZOVIRAX) 400 MG tablet; Take 1 tablet (400 mg) by mouth 3 times daily    Herpes simplex virus infection    - acyclovir (ZOVIRAX) 400 MG tablet; Take 1 tablet (400 mg) by mouth 3 times daily    Need for Tdap vaccination    - TDAP 10-64Y  "(ADACEL,BOOSTRIX)    Pain in joint involving left pelvic region and thigh  Seeing PT and Ortho.  Has gotten an injection.  Will defer to them  She has a sit/stand desk at work which helps  Standing is the best position or laying down  Feels like she is sitting on a rock          BMI  Estimated body mass index is 29.13 kg/m  as calculated from the following:    Height as of this encounter: 1.702 m (5' 7\").    Weight as of this encounter: 84.4 kg (186 lb).       Counseling  Appropriate preventive services were discussed with this patient, including applicable screening as appropriate for fall prevention, nutrition, physical activity, Tobacco-use cessation, weight loss and cognition.  Checklist reviewing preventive services available has been given to the patient.  Reviewed patient's diet, addressing concerns and/or questions.   She is at risk for psychosocial distress and has been provided with information to reduce risk.       Patient Instructions   Lab in [address]    Gabapentin three times a day prescription 1-2 pills     Medication refilled     Darin Regan is a 59 year old, presenting for the following:  Physical (Fasting)        5/15/2024     6:47 AM   Additional Questions   Roomed by Korina SULLIVAN        Health Care Directive  Patient has a Health Care Directive on file  Advance care planning document is on file and is current.    HPI    She is fasting    She has a biometric form    She is seeing PT and Ortho for hip and buttocks pain  She had an injection but that did not help        5/14/2024   General Health   How would you rate your overall physical health? Good   Feel stress (tense, anxious, or unable to sleep) Only a little   (!) STRESS CONCERN      5/14/2024   Nutrition   Three or more servings of calcium each day? Yes   Diet: Regular (no restrictions)   How many servings of fruit and vegetables per day? (!) 0-1   How many sweetened beverages each day? 0-1         5/14/2024   Exercise   Days per week " of moderate/strenous exercise 5 days   Average minutes spent exercising at this level 40 min         5/14/2024   Social Factors   Frequency of gathering with friends or relatives Once a week   Worry food won't last until get money to buy more No   Food not last or not have enough money for food? No   Do you have housing?  Yes   Are you worried about losing your housing? No   Lack of transportation? No   Unable to get utilities (heat,electricity)? No         5/14/2024   Fall Risk   Fallen 2 or more times in the past year? No   Trouble with walking or balance? No          5/14/2024   Dental   Dentist two times every year? Yes         5/14/2024   TB Screening   Were you born outside of the [place]? No       Today's PHQ-9 Score:       5/14/2024     7:38 AM   PHQ-9 SCORE   PHQ-9 Total Score MyChart 0   PHQ-9 Total Score 0         5/14/2024   Substance Use   Alcohol more than 3/day or more than 7/wk No   Do you use any other substances recreationally? (!) OTHER     Social History     Tobacco Use    Smoking status: Never     Passive exposure: Never    Smokeless tobacco: Never   Vaping Use    Vaping status: Never Used   Substance Use Topics    Alcohol use: Yes     Comment: seldom    Drug use: No           3/28/2024   LAST FHS-7 RESULTS   1st degree relative breast or ovarian cancer No   Any relative bilateral breast cancer No   Any male have breast cancer No   Any ONE woman have BOTH breast AND ovarian cancer No   Any woman with breast cancer before 50yrs No   2 or more relatives with breast AND/OR ovarian cancer No   2 or more relatives with breast AND/OR bowel cancer No                5/14/2024   STI Screening   New sexual partner(s) since last STI/HIV test? No     History of abnormal Pap smear:         Latest Ref Rng & Units 5/4/2023     1:41 PM 3/13/2018     8:50 AM 3/13/2018     8:32 AM   PAP / HPV   PAP  Negative for Intraepithelial Lesion or Malignancy (NILM)      PAP (Historical)    NIL    HPV 16 DNA Negative Negative   "Negative     HPV 18 DNA Negative Negative  Negative     Other HR HPV Negative Negative  Negative       ASCVD Risk   The 10-year ASCVD risk score (Min EDGAR, et al., 2019) is: 2.3%    Values used to calculate the score:      Age: 59 years      Sex: Female      Is Non- : No      Diabetic: No      Tobacco smoker: No      Systolic Blood Pressure: 112 mmHg      Is BP treated: No      HDL Cholesterol: 59 mg/dL      Total Cholesterol: 163 mg/dL           Reviewed and updated as needed this visit by Provider    Allergies                 Lab work is in process      Review of Systems  Constitutional, neuro, ENT, endocrine, pulmonary, cardiac, gastrointestinal, genitourinary, musculoskeletal, integument and psychiatric systems are negative, except as otherwise noted.     Objective    Exam  /70   Pulse 80   Temp 98.4  F (36.9  C) (Oral)   Resp 16   Ht 1.702 m (5' 7\")   Wt 84.4 kg (186 lb)   LMP  (LMP Unknown)   SpO2 100%   BMI 29.13 kg/m     Estimated body mass index is 29.13 kg/m  as calculated from the following:    Height as of this encounter: 1.702 m (5' 7\").    Weight as of this encounter: 84.4 kg (186 lb).    Physical Exam  GENERAL: alert and no distress  EYES: Eyes grossly normal to inspection, and conjunctivae and sclerae normal  HENT: ear canals and TM's normal, nose and mouth without ulcers or lesions  NECK: no adenopathy, no asymmetry, masses, or scars  RESP: lungs clear to auscultation - no rales, rhonchi or wheezes  CV: regular rate and rhythm, normal S1 S2, no S3 or S4, no murmur, click or rub, no peripheral edema  ABDOMEN: soft, nontender, no hepatosplenomegaly, no masses and bowel sounds normal  MS: no gross musculoskeletal defects noted, no edema  MS: Feels like she is sitting on a rock on her left buttock area  SKIN: no suspicious lesions or rashes  NEURO: Normal strength and tone, mentation intact and speech normal  PSYCH: mentation appears normal, affect " normal/bright        Signed Electronically by: ABISAI Zhang CNP

## 2024-05-16 ENCOUNTER — TELEPHONE (OUTPATIENT)
Dept: INTERNAL MEDICINE | Facility: CLINIC | Age: 59
End: 2024-05-16

## 2024-05-16 ENCOUNTER — PATIENT OUTREACH (OUTPATIENT)
Dept: ONCOLOGY | Facility: CLINIC | Age: 59
End: 2024-05-16

## 2024-05-16 ENCOUNTER — MYC MEDICAL ADVICE (OUTPATIENT)
Dept: INTERNAL MEDICINE | Facility: CLINIC | Age: 59
End: 2024-05-16

## 2024-05-16 DIAGNOSIS — D69.6 LOW PLATELET COUNT (H): Primary | ICD-10-CM

## 2024-05-16 LAB
ALBUMIN SERPL BCG-MCNC: 4.2 G/DL (ref 3.5–5.2)
ALP SERPL-CCNC: 74 U/L (ref 40–150)
ALT SERPL W P-5'-P-CCNC: 16 U/L (ref 0–50)
ANION GAP SERPL CALCULATED.3IONS-SCNC: 8 MMOL/L (ref 7–15)
AST SERPL W P-5'-P-CCNC: 21 U/L (ref 0–45)
BILIRUB SERPL-MCNC: 0.6 MG/DL
BUN SERPL-MCNC: 9.3 MG/DL (ref 8–23)
CALCIUM SERPL-MCNC: 9.4 MG/DL (ref 8.8–10.2)
CHLORIDE SERPL-SCNC: 106 MMOL/L (ref 98–107)
CHOLEST SERPL-MCNC: 159 MG/DL
CREAT SERPL-MCNC: 0.86 MG/DL (ref 0.51–0.95)
DEPRECATED HCO3 PLAS-SCNC: 29 MMOL/L (ref 22–29)
EGFRCR SERPLBLD CKD-EPI 2021: 76 ML/MIN/1.73M2
FASTING STATUS PATIENT QL REPORTED: YES
FASTING STATUS PATIENT QL REPORTED: YES
GLUCOSE SERPL-MCNC: 96 MG/DL (ref 70–99)
HDLC SERPL-MCNC: 75 MG/DL
LDLC SERPL CALC-MCNC: 75 MG/DL
NONHDLC SERPL-MCNC: 84 MG/DL
POTASSIUM SERPL-SCNC: 5.2 MMOL/L (ref 3.4–5.3)
PROT SERPL-MCNC: 6.5 G/DL (ref 6.4–8.3)
SODIUM SERPL-SCNC: 143 MMOL/L (ref 135–145)
TRIGL SERPL-MCNC: 47 MG/DL
TSH SERPL DL<=0.005 MIU/L-ACNC: 1.62 UIU/ML (ref 0.3–4.2)

## 2024-05-16 NOTE — TELEPHONE ENCOUNTER
[facility] form received when patient in clinic for appointment 5-.     Form completed and faxed to [phone]. Original mailed to pt and copy in chart

## 2024-05-16 NOTE — PROGRESS NOTES
Hematology referral reviewed for [facility], see below.    Referral reason: referral received from primary care for chronically low platelets    Clinical question entered by referring provider or through order transcription: platelet low     Referral received via: Internal referral by [facility]    Current abnormal labs: Available in Chart Review    Outreach: Jarrod sent to patient    Plan: Triage instructions updated and sent to NPS for completion.

## 2024-05-16 NOTE — TELEPHONE ENCOUNTER
Please see patient's mychart message below.     Patient agreeable to hematology referral, pended.     Please advise, thanks.    Fabien Lepe, Triage RN [facility]  11:13 AM 5/16/2024

## 2024-05-16 NOTE — TELEPHONE ENCOUNTER
Sent D[employer]ym[employer] message to patient.    Fabien Lepe, Triage RN [place] [facility]  9:38 AM 5/16/2024

## 2024-05-16 NOTE — TELEPHONE ENCOUNTER
Referral placed    Seeing them will not necessarily fix this, but would like to know why her platelets are low

## 2024-06-05 ENCOUNTER — LAB (OUTPATIENT)
Dept: LAB | Facility: CLINIC | Age: 59
End: 2024-06-05
Payer: COMMERCIAL

## 2024-06-05 DIAGNOSIS — D69.6 THROMBOCYTOPENIA (H): ICD-10-CM

## 2024-06-05 LAB
BASOPHILS # BLD AUTO: 0 10E3/UL (ref 0–0.2)
BASOPHILS NFR BLD AUTO: 0 %
EOSINOPHIL # BLD AUTO: 0.1 10E3/UL (ref 0–0.7)
EOSINOPHIL NFR BLD AUTO: 2 %
ERYTHROCYTE [DISTWIDTH] IN BLOOD BY AUTOMATED COUNT: 12.5 % (ref 10–15)
HCT VFR BLD AUTO: 40.4 % (ref 35–47)
HGB BLD-MCNC: 13.8 G/DL (ref 11.7–15.7)
IMM GRANULOCYTES # BLD: 0 10E3/UL
IMM GRANULOCYTES NFR BLD: 0 %
LYMPHOCYTES # BLD AUTO: 1.4 10E3/UL (ref 0.8–5.3)
LYMPHOCYTES NFR BLD AUTO: 31 %
MCH RBC QN AUTO: 30.2 PG (ref 26.5–33)
MCHC RBC AUTO-ENTMCNC: 34.2 G/DL (ref 31.5–36.5)
MCV RBC AUTO: 88 FL (ref 78–100)
MONOCYTES # BLD AUTO: 0.4 10E3/UL (ref 0–1.3)
MONOCYTES NFR BLD AUTO: 8 %
NEUTROPHILS # BLD AUTO: 2.7 10E3/UL (ref 1.6–8.3)
NEUTROPHILS NFR BLD AUTO: 59 %
PLATELET # BLD AUTO: 142 10E3/UL (ref 150–450)
RBC # BLD AUTO: 4.57 10E6/UL (ref 3.8–5.2)
RETICS # AUTO: 0.07 10E6/UL (ref 0.03–0.1)
RETICS/RBC NFR AUTO: 1.5 % (ref 0.5–2)
WBC # BLD AUTO: 4.6 10E3/UL (ref 4–11)

## 2024-06-05 PROCEDURE — 99207 BLOOD MORPHOLOGY PATHOLOGIST REVIEW: CPT | Performed by: PATHOLOGY

## 2024-06-05 PROCEDURE — 36415 COLL VENOUS BLD VENIPUNCTURE: CPT

## 2024-06-05 PROCEDURE — 80053 COMPREHEN METABOLIC PANEL: CPT

## 2024-06-05 PROCEDURE — 85025 COMPLETE CBC W/AUTO DIFF WBC: CPT

## 2024-06-05 PROCEDURE — 85045 AUTOMATED RETICULOCYTE COUNT: CPT

## 2024-06-06 LAB
ALBUMIN SERPL BCG-MCNC: 4.3 G/DL (ref 3.5–5.2)
ALP SERPL-CCNC: 77 U/L (ref 40–150)
ALT SERPL W P-5'-P-CCNC: 16 U/L (ref 0–50)
ANION GAP SERPL CALCULATED.3IONS-SCNC: 8 MMOL/L (ref 7–15)
AST SERPL W P-5'-P-CCNC: 21 U/L (ref 0–45)
BILIRUB SERPL-MCNC: 0.5 MG/DL
BUN SERPL-MCNC: 8.4 MG/DL (ref 8–23)
CALCIUM SERPL-MCNC: 9.5 MG/DL (ref 8.8–10.2)
CHLORIDE SERPL-SCNC: 105 MMOL/L (ref 98–107)
CREAT SERPL-MCNC: 0.9 MG/DL (ref 0.51–0.95)
DEPRECATED HCO3 PLAS-SCNC: 28 MMOL/L (ref 22–29)
EGFRCR SERPLBLD CKD-EPI 2021: 72 ML/MIN/1.73M2
GLUCOSE SERPL-MCNC: 95 MG/DL (ref 70–99)
POTASSIUM SERPL-SCNC: 4.5 MMOL/L (ref 3.4–5.3)
PROT SERPL-MCNC: 6.7 G/DL (ref 6.4–8.3)
SODIUM SERPL-SCNC: 141 MMOL/L (ref 135–145)

## 2024-06-08 NOTE — TELEPHONE ENCOUNTER
RECORDS STATUS - ALL OTHER DIAGNOSIS      RECORDS RECEIVED FROM: [facility]   NOTES STATUS DETAILS   OFFICE NOTE from referring provider Epic 5/15/24: ABIASI Richard CNP   OFFICE NOTE from medical oncologist Epic 9/4/18: Dr. Bo Palencia  7/18/18: Dr. Kurt Del Cid   MEDICATION LIST [facility]    LABS     PATHOLOGY REPORTS Report in Epic Morphology:  7/18/18: [id number]    ANYTHING RELATED TO DIAGNOSIS Epic Most recent 6/5/24

## 2024-06-10 LAB
PATH REPORT.COMMENTS IMP SPEC: NORMAL
PATH REPORT.FINAL DX SPEC: NORMAL
PATH REPORT.MICROSCOPIC SPEC OTHER STN: NORMAL
PATH REPORT.MICROSCOPIC SPEC OTHER STN: NORMAL

## 2024-06-24 ENCOUNTER — PRE VISIT (OUTPATIENT)
Dept: ONCOLOGY | Facility: CLINIC | Age: 59
End: 2024-06-24
Payer: COMMERCIAL

## 2024-06-24 ENCOUNTER — ONCOLOGY VISIT (OUTPATIENT)
Dept: ONCOLOGY | Facility: CLINIC | Age: 59
End: 2024-06-24
Attending: NURSE PRACTITIONER
Payer: COMMERCIAL

## 2024-06-24 VITALS
SYSTOLIC BLOOD PRESSURE: 115 MMHG | DIASTOLIC BLOOD PRESSURE: 74 MMHG | RESPIRATION RATE: 16 BRPM | HEART RATE: 62 BPM | WEIGHT: 181 LBS | OXYGEN SATURATION: 98 % | BODY MASS INDEX: 28.41 KG/M2 | HEIGHT: 67 IN | TEMPERATURE: 98 F

## 2024-06-24 DIAGNOSIS — D69.6 THROMBOCYTOPENIA (H): Primary | ICD-10-CM

## 2024-06-24 DIAGNOSIS — D69.6 LOW PLATELET COUNT (H): ICD-10-CM

## 2024-06-24 PROCEDURE — 99213 OFFICE O/P EST LOW 20 MIN: CPT | Performed by: INTERNAL MEDICINE

## 2024-06-24 PROCEDURE — 99204 OFFICE O/P NEW MOD 45 MIN: CPT | Performed by: INTERNAL MEDICINE

## 2024-06-24 ASSESSMENT — ENCOUNTER SYMPTOMS
FATIGUE: 1
PSYCHIATRIC NEGATIVE: 1
GASTROINTESTINAL NEGATIVE: 1
RESPIRATORY NEGATIVE: 1
HEMATOLOGIC/LYMPHATIC NEGATIVE: 1
BACK PAIN: 1
CARDIOVASCULAR NEGATIVE: 1
ENDOCRINE NEGATIVE: 1
EYES NEGATIVE: 1
NECK PAIN: 1
NECK STIFFNESS: 1

## 2024-06-24 ASSESSMENT — PAIN SCALES - GENERAL: PAINLEVEL: NO PAIN (0)

## 2024-06-24 NOTE — PROGRESS NOTES
Assessment & Plan    Borderline, asymptomatic thrombocytopenia of many years' duration, suggestive for compensated ITP  Prior bariatric surgery (2005) with anastomotic leak    Patient reassured that no therapy would be needed so long as her platelet count is >30K  Encouraged to take folate 1 mg per day  Recheck in 6 months    History  This is an initial hematology consultation visit for this  referred for low platelets.    In retrospect she's had slightly low platelets for many years.  She's never had serious bleeding, nosebleeds, easy bruising or other hemostasis problems.  In fact her general health is quite good.    She has had back and neck problems including a neck issue that required fusion.  She's getting treatment for L5 disc disease.  She's modified her exercise from running to cycling and generally goes for 40-50 mile/day rides.    She does take vitamin supplements.    ECOG = 0    Patient Active Problem List   Diagnosis    Neuropathy    Anxiety    Menometrorrhagia    CARDIOVASCULAR SCREENING; LDL GOAL LESS THAN 100    S/P gastric bypass    Intermittent asthma    Pain in joint, pelvic region and thigh    Non morbid obesity due to excess calories    Iron deficiency    Intestinal malabsorption, unspecified type    Thrombocytopenia (H24)    Myelomalacia of cervical cord (H)    Anemia, iron deficiency    Degeneration of cervical intervertebral disc    Exercise induced bronchospasm    Generalized anxiety disorder    Hyperlipidemia    Irritable bowel syndrome    Migraine headache    Sacroiliitis, not elsewhere classified (H24)    Left hip pain    Piriformis syndrome of left side    DDD (degenerative disc disease), lumbar    Primary osteoarthritis of left hip     Current Outpatient Medications   Medication Sig Dispense Refill    acyclovir (ZOVIRAX) 400 MG tablet Take 1 tablet (400 mg) by mouth 3 times daily 21 tablet 5    albuterol (PROAIR HFA/PROVENTIL HFA/VENTOLIN HFA) 108 (90 Base) MCG/ACT  "inhaler Inhale 2 puffs into the lungs every 4 hours as needed for shortness of breath 18 g 4    fluticasone (FLONASE) 50 MCG/ACT nasal spray Spray 1 spray into both nostrils daily 16 g 11    gabapentin (NEURONTIN) 100 MG capsule Take 2 capsules (200 mg) by mouth 3 times daily 540 capsule 3    HERBALS \"Animal Flex\"      lidocaine-prilocaine (EMLA) 2.5-2.5 % external cream Apply topically every 6 hours as needed for moderate pain Profile Rx: patient will contact pharmacy when needed 70 g 1     Current Facility-Administered Medications   Medication Dose Route Frequency Provider Last Rate Last Admin    lidocaine 1 % injection 6 mL  6 mL      6 mL at 04/19/24 0829    triamcinolone (KENALOG-40) injection 40 mg  40 mg      40 mg at 04/19/24 0829     Past Medical History:   Diagnosis Date    Agoraphobia with panic disorder     elevators and cars    Arthritis     Diabetes mellitus     type 2 resolved after gastric bypass    Disturbance of skin sensation     left leg as residual of neck surgery    Gastro-oesophageal reflux disease     Genital herpes, unspecified     Intrinsic asthma, unspecified     exercise induced    Obesity     Other psoriasis     Spasm of muscle     left leg as residual of neck surgery     Past Surgical History:   Procedure Laterality Date    ABDOMEN SURGERY  2008    Gastric Bypass    BIOPSY VULVA Left 11/10/2015    Procedure: BIOPSY VULVA;  Surgeon: Diana Rosado DO;  Location: [facility] OR    C IUD,MIRENA  06/01/2010    CHOLECYSTECTOMY, LAPOROSCOPIC  01/01/2007    Cholecystectomy, Laparoscopic    COLONOSCOPY N/A 01/20/2017    Procedure: COLONOSCOPY;  Surgeon: Tank Peguero MD;  Location:  GI    D & C  06/29/2010    removal of polyp and placement of Mirena    DAVINCI BYPASS GASTRIC BRANT-EN-Y      ZZC VILLA W/O FACETEC FORAMOT/DSKC 1/2 VRT SEG, CERVICAL  01/01/2001    fusion c4-5-6,herniated discs     Socioeconomic History    Marital status: Single    Number of children: 0   Occupational History     " "Employer: [employer]      Social Determinants of Health     Social Connections: Unknown (5/14/2024)    Social Connection and Isolation Panel [NHANES]     Frequency of Social Gatherings with Friends and Family: Once a week   Interpersonal Safety: Low Risk  (5/15/2024)    Interpersonal Safety     Do you feel physically and emotionally safe where you currently live?: Yes     Within the past 12 months, have you been hit, slapped, kicked or otherwise physically hurt by someone?: No     Within the past 12 months, have you been humiliated or emotionally abused in other ways by your partner or ex-partner?: No     Review of Systems   Constitutional:  Positive for fatigue.   HENT:  Negative.     Eyes: Negative.    Respiratory: Negative.     Cardiovascular: Negative.    Gastrointestinal: Negative.    Endocrine: Negative.    Genitourinary: Negative.     Musculoskeletal:  Positive for back pain, neck pain and neck stiffness.   Skin: Negative.    Neurological:         Has had asymmetric reflexes since her neck fusion   Hematological: Negative.    Psychiatric/Behavioral: Negative.     All other systems reviewed and are negative.      /74   Pulse 62   Temp 98  F (36.7  C) (Tympanic)   Resp 16   Ht 1.702 m (5' 7\")   Wt 82.1 kg (181 lb)   LMP  (LMP Unknown)   SpO2 98%   BMI 28.35 kg/m      Physical Exam  Vitals and nursing note reviewed.   Constitutional:       Appearance: Normal appearance. She is well-developed and well-groomed.      Comments: Very pleasant well-appearing white woman   HENT:      Head: Normocephalic and atraumatic.      Mouth/Throat:      Mouth: Mucous membranes are moist.      Dentition: Normal dentition. No dental caries.   Eyes:      Extraocular Movements: Extraocular movements intact.      Conjunctiva/sclera: Conjunctivae normal.      Pupils: Pupils are equal, round, and reactive to light.   Neck:        Comments: Surgical scar  Cardiovascular:      Rate and Rhythm: Normal rate and regular rhythm. "      Pulses: Normal pulses.      Heart sounds: Normal heart sounds.   Pulmonary:      Effort: Pulmonary effort is normal.      Breath sounds: Normal breath sounds.   Abdominal:      General: A surgical scar is present. There is no distension.      Palpations: Abdomen is soft. There is no mass.      Tenderness: There is no abdominal tenderness. There is no guarding.   Musculoskeletal:         General: No swelling or deformity.      Cervical back: Normal range of motion and neck supple.   Lymphadenopathy:      Cervical: No cervical adenopathy.      Upper Body:      Right upper body: No axillary or epitrochlear adenopathy.      Left upper body: No axillary or epitrochlear adenopathy.   Skin:     General: Skin is warm and dry.   Neurological:      General: No focal deficit present.      Mental Status: She is alert and oriented to person, place, and time.      Cranial Nerves: No cranial nerve deficit.      Motor: No weakness.      Deep Tendon Reflexes: Reflexes abnormal.   Psychiatric:         Mood and Affect: Mood normal.         Behavior: Behavior normal. Behavior is cooperative.         Thought Content: Thought content normal.         Judgment: Judgment normal.         Recent Results (from the past 720 hour(s))   Comprehensive metabolic panel    Collection Time: 06/05/24 10:56 AM   Result Value Ref Range    Sodium 141 135 - 145 mmol/L    Potassium 4.5 3.4 - 5.3 mmol/L    Carbon Dioxide (CO2) 28 22 - 29 mmol/L    Anion Gap 8 7 - 15 mmol/L    Urea Nitrogen 8.4 8.0 - 23.0 mg/dL    Creatinine 0.90 0.51 - 0.95 mg/dL    GFR Estimate 72 >60 mL/min/1.73m2    Calcium 9.5 8.8 - 10.2 mg/dL    Chloride 105 98 - 107 mmol/L    Glucose 95 70 - 99 mg/dL    Alkaline Phosphatase 77 40 - 150 U/L    AST 21 0 - 45 U/L    ALT 16 0 - 50 U/L    Protein Total 6.7 6.4 - 8.3 g/dL    Albumin 4.3 3.5 - 5.2 g/dL    Bilirubin Total 0.5 <=1.2 mg/dL   CBC with platelets and differential    Collection Time: 06/05/24 10:56 AM   Result Value Ref Range     WBC Count 4.6 4.0 - 11.0 10e3/uL    RBC Count 4.57 3.80 - 5.20 10e6/uL    Hemoglobin 13.8 11.7 - 15.7 g/dL    Hematocrit 40.4 35.0 - 47.0 %    MCV 88 78 - 100 fL    MCH 30.2 26.5 - 33.0 pg    MCHC 34.2 31.5 - 36.5 g/dL    RDW 12.5 10.0 - 15.0 %    Platelet Count 142 (L) 150 - 450 10e3/uL    % Neutrophils 59 %    % Lymphocytes 31 %    % Monocytes 8 %    % Eosinophils 2 %    % Basophils 0 %    % Immature Granulocytes 0 %    Absolute Neutrophils 2.7 1.6 - 8.3 10e3/uL    Absolute Lymphocytes 1.4 0.8 - 5.3 10e3/uL    Absolute Monocytes 0.4 0.0 - 1.3 10e3/uL    Absolute Eosinophils 0.1 0.0 - 0.7 10e3/uL    Absolute Basophils 0.0 0.0 - 0.2 10e3/uL    Absolute Immature Granulocytes 0.0 <=0.4 10e3/uL   Reticulocyte count    Collection Time: 06/05/24 10:56 AM   Result Value Ref Range    % Reticulocyte 1.5 0.5 - 2.0 %    Absolute Reticulocyte 0.068 0.025 - 0.095 10e6/uL   Bld morphology pathology review    Collection Time: 06/10/24  1:28 PM   Result Value Ref Range    Final Diagnosis       Peripheral blood, morphology:  - Mild thrombocytopenia; no abnormal platelet clumping seen on the slide preparations.  - Hemoglobin quantitatively within normal limits and erythrocytes without diagnostic morphologic abnormality.  - WBC subsets quantitatively within normal limits and without diagnostic morphologic abnormality.  - Negative for schistocytes and definitive morphologic features of hemolysis.   - Negative for overt features of myelodysplasia and circulating blasts.    See comment.    COMMENT:  Thrombocytopenia is nonspecific and possible etiologies may include immune-associated platelet destruction (such as immune thrombocytopenia [ITP], heparin-induced thrombocytopenia [HIT], post-transfusion purpura, or drug-induced antibodies), non-immune-associated platelet destruction (such as recent thrombosis or thrombotic microangiopathies), decreased platelet production (such as vitamin B12 or folate deficiency, underlying infection,  myelodysplasia and other hematologic malignancies, aplastic anemia, or bone marrow suppression [by alcohol, medications, chemotherapy, radiation, or marrow infiltrative processes]), medications (including immunosuppressive agents), pregnancy (if applicable), splenic sequestration of platelets, or liver disease.  Clinical correlation is recommended.       Peripheral Smear       A microscopic examination is performed.       Peripheral Hematologic Data        Latest Reference Range & Units 06/05/24 10:56   WBC 4.0 - 11.0 10e3/uL 4.6   Hemoglobin 11.7 - 15.7 g/dL 13.8   Hematocrit 35.0 - 47.0 % 40.4   Platelet Count 150 - 450 10e3/uL 142 (L)   RBC Count 3.80 - 5.20 10e6/uL 4.57   MCV 78 - 100 fL 88   MCH 26.5 - 33.0 pg 30.2   MCHC 31.5 - 36.5 g/dL 34.2   RDW 10.0 - 15.0 % 12.5   % Neutrophils % 59   % Lymphocytes % 31   % Monocytes % 8   % Eosinophils % 2   % Basophils % 0   Absolute Basophils 0.0 - 0.2 10e3/uL 0.0   Absolute Eosinophils 0.0 - 0.7 10e3/uL 0.1   Absolute Immature Granulocytes <=0.4 10e3/uL 0.0   Absolute Lymphocytes 0.8 - 5.3 10e3/uL 1.4   Absolute Monocytes 0.0 - 1.3 10e3/uL 0.4   % Immature Granulocytes % 0   Absolute Neutrophils 1.6 - 8.3 10e3/uL 2.7   % Retic 0.5 - 2.0 % 1.5   Absolute Retic 0.025 - 0.095 10e6/uL 0.068   (L): Data is abnormally low    For patients over 18 years old, anemia and quantitative abnormalities of WBC and platelets (if present) may be further stratified as follows:    WBC (10^9/L):    Greater than 50.0: Marked leukocytosis    18.0 - 50.0: Moderate leukocytosis    11.1 - 17.9: Mild leukocytosis    3.0 - 3.9: Mild leukopenia    2.0 - 2.9: Moderate leukopenia    Less than 2.0: Marked leukopenia    Hemoglobin (g/dL) in females:    9.7 - 11.6: Mild anemia    7.7 - 9.6: Moderate anemia    Less than 7.7: Marked anemia    Platelets (10^9/L):    Greater than 900: Marked thrombocytosis    601 - 900: Moderate thrombocytosis    451 - 600: Mild thrombocytosis    100 - 149: Mild  thrombocytopenia    50 - 99: Moderate thrombocytopenia    Less than 50: Marked thrombocytopenia       Performing Labs       The technical component of this testing was completed at [facility], [facility] and [facility]       No results found for this or any previous visit (from the past 744 hour(s)).

## 2024-06-24 NOTE — NURSING NOTE
"Oncology Rooming Note    June 24, 2024 10:59 AM   Shereen Oliver is a 59 year old female who presents for:    Chief Complaint   Patient presents with    Oncology Clinic Visit     New patient     Initial Vitals: /74   Pulse 62   Temp 98  F (36.7  C) (Tympanic)   Resp 16   Ht 1.702 m (5' 7\")   Wt 82.1 kg (181 lb)   LMP  (LMP Unknown)   SpO2 98%   BMI 28.35 kg/m   Estimated body mass index is 28.35 kg/m  as calculated from the following:    Height as of this encounter: 1.702 m (5' 7\").    Weight as of this encounter: 82.1 kg (181 lb). Body surface area is 1.97 meters squared.  No Pain (0) Comment: Data Unavailable   No LMP recorded (lmp unknown). Patient is postmenopausal.  Allergies reviewed: Yes  Medications reviewed: Yes    Medications: Medication refills not needed today.  Pharmacy name entered into [employer]: [id number] - [place], [place] - [address] [place] [address] [address] [facility] & [facility] [address]    Frailty Screening:   Is the patient here for a new oncology consult visit in cancer care? 2. No      Clinical concerns: new patient       Ana Johnson CMA              "

## 2024-06-24 NOTE — LETTER
6/24/2024      Shereen Oliver  [address]  [place] [address]      Dear Colleague,    Thank you for referring your patient, Shereen Oliver, to the [facility]. Please see a copy of my visit note below.    Assessment & Plan    Borderline, asymptomatic thrombocytopenia of many years' duration, suggestive for compensated ITP  Prior bariatric surgery (2005) with anastomotic leak    Patient reassured that no therapy would be needed so long as her platelet count is >30K  Encouraged to take folate 1 mg per day  Recheck in 6 months    History  This is an initial hematology consultation visit for this  referred for low platelets.    In retrospect she's had slightly low platelets for many years.  She's never had serious bleeding, nosebleeds, easy bruising or other hemostasis problems.  In fact her general health is quite good.    She has had back and neck problems including a neck issue that required fusion.  She's getting treatment for L5 disc disease.  She's modified her exercise from running to cycling and generally goes for 40-50 mile/day rides.    She does take vitamin supplements.    ECOG = 0    Patient Active Problem List   Diagnosis     Neuropathy     Anxiety     Menometrorrhagia     CARDIOVASCULAR SCREENING; LDL GOAL LESS THAN 100     S/P gastric bypass     Intermittent asthma     Pain in joint, pelvic region and thigh     Non morbid obesity due to excess calories     Iron deficiency     Intestinal malabsorption, unspecified type     Thrombocytopenia (H24)     Myelomalacia of cervical cord (H)     Anemia, iron deficiency     Degeneration of cervical intervertebral disc     Exercise induced bronchospasm     Generalized anxiety disorder     Hyperlipidemia     Irritable bowel syndrome     Migraine headache     Sacroiliitis, not elsewhere classified (H24)     Left hip pain     Piriformis syndrome of left side     DDD (degenerative disc disease), lumbar  "    Primary osteoarthritis of left hip     Current Outpatient Medications   Medication Sig Dispense Refill     acyclovir (ZOVIRAX) 400 MG tablet Take 1 tablet (400 mg) by mouth 3 times daily 21 tablet 5     albuterol (PROAIR HFA/PROVENTIL HFA/VENTOLIN HFA) 108 (90 Base) MCG/ACT inhaler Inhale 2 puffs into the lungs every 4 hours as needed for shortness of breath 18 g 4     fluticasone (FLONASE) 50 MCG/ACT nasal spray Spray 1 spray into both nostrils daily 16 g 11     gabapentin (NEURONTIN) 100 MG capsule Take 2 capsules (200 mg) by mouth 3 times daily 540 capsule 3     HERBALS \"Animal Flex\"       lidocaine-prilocaine (EMLA) 2.5-2.5 % external cream Apply topically every 6 hours as needed for moderate pain Profile Rx: patient will contact pharmacy when needed 70 g 1     Current Facility-Administered Medications   Medication Dose Route Frequency Provider Last Rate Last Admin     lidocaine 1 % injection 6 mL  6 mL      6 mL at 04/19/24 0829     triamcinolone (KENALOG-40) injection 40 mg  40 mg      40 mg at 04/19/24 0829     Past Medical History:   Diagnosis Date     Agoraphobia with panic disorder     elevators and cars     Arthritis      Diabetes mellitus     type 2 resolved after gastric bypass     Disturbance of skin sensation     left leg as residual of neck surgery     Gastro-oesophageal reflux disease      Genital herpes, unspecified      Intrinsic asthma, unspecified     exercise induced     Obesity      Other psoriasis      Spasm of muscle     left leg as residual of neck surgery     Past Surgical History:   Procedure Laterality Date     ABDOMEN SURGERY  2008    Gastric Bypass     BIOPSY VULVA Left 11/10/2015    Procedure: BIOPSY VULVA;  Surgeon: Diana Rosado DO;  Location: [facility] OR     C IUD,MIRENA  06/01/2010     CHOLECYSTECTOMY, LAPOROSCOPIC  01/01/2007    Cholecystectomy, Laparoscopic     COLONOSCOPY N/A 01/20/2017    Procedure: COLONOSCOPY;  Surgeon: Tank Peguero MD;  Location:  GI     D & C " " 06/29/2010    removal of polyp and placement of Mirena     DAVINCI BYPASS GASTRIC BRANT-EN-Y       ZZC VILLA W/O FACETEC FORAMOT/DSKC 1/2 VRT SEG, CERVICAL  01/01/2001    fusion c4-5-6,herniated discs     Socioeconomic History     Marital status: Single     Number of children: 0   Occupational History     Employer: [employer]      Social Determinants of Health     Social Connections: Unknown (5/14/2024)    Social Connection and Isolation Panel [NHANES]      Frequency of Social Gatherings with Friends and Family: Once a week   Interpersonal Safety: Low Risk  (5/15/2024)    Interpersonal Safety      Do you feel physically and emotionally safe where you currently live?: Yes      Within the past 12 months, have you been hit, slapped, kicked or otherwise physically hurt by someone?: No      Within the past 12 months, have you been humiliated or emotionally abused in other ways by your partner or ex-partner?: No     Review of Systems   Constitutional:  Positive for fatigue.   HENT:  Negative.     Eyes: Negative.    Respiratory: Negative.     Cardiovascular: Negative.    Gastrointestinal: Negative.    Endocrine: Negative.    Genitourinary: Negative.     Musculoskeletal:  Positive for back pain, neck pain and neck stiffness.   Skin: Negative.    Neurological:         Has had asymmetric reflexes since her neck fusion   Hematological: Negative.    Psychiatric/Behavioral: Negative.     All other systems reviewed and are negative.      /74   Pulse 62   Temp 98  F (36.7  C) (Tympanic)   Resp 16   Ht 1.702 m (5' 7\")   Wt 82.1 kg (181 lb)   LMP  (LMP Unknown)   SpO2 98%   BMI 28.35 kg/m      Physical Exam  Vitals and nursing note reviewed.   Constitutional:       Appearance: Normal appearance. She is well-developed and well-groomed.      Comments: Very pleasant well-appearing white woman   HENT:      Head: Normocephalic and atraumatic.      Mouth/Throat:      Mouth: Mucous membranes are moist.      Dentition: Normal " dentition. No dental caries.   Eyes:      Extraocular Movements: Extraocular movements intact.      Conjunctiva/sclera: Conjunctivae normal.      Pupils: Pupils are equal, round, and reactive to light.   Neck:        Comments: Surgical scar  Cardiovascular:      Rate and Rhythm: Normal rate and regular rhythm.      Pulses: Normal pulses.      Heart sounds: Normal heart sounds.   Pulmonary:      Effort: Pulmonary effort is normal.      Breath sounds: Normal breath sounds.   Abdominal:      General: A surgical scar is present. There is no distension.      Palpations: Abdomen is soft. There is no mass.      Tenderness: There is no abdominal tenderness. There is no guarding.   Musculoskeletal:         General: No swelling or deformity.      Cervical back: Normal range of motion and neck supple.   Lymphadenopathy:      Cervical: No cervical adenopathy.      Upper Body:      Right upper body: No axillary or epitrochlear adenopathy.      Left upper body: No axillary or epitrochlear adenopathy.   Skin:     General: Skin is warm and dry.   Neurological:      General: No focal deficit present.      Mental Status: She is alert and oriented to person, place, and time.      Cranial Nerves: No cranial nerve deficit.      Motor: No weakness.      Deep Tendon Reflexes: Reflexes abnormal.   Psychiatric:         Mood and Affect: Mood normal.         Behavior: Behavior normal. Behavior is cooperative.         Thought Content: Thought content normal.         Judgment: Judgment normal.         Recent Results (from the past 720 hour(s))   Comprehensive metabolic panel    Collection Time: 06/05/24 10:56 AM   Result Value Ref Range    Sodium 141 135 - 145 mmol/L    Potassium 4.5 3.4 - 5.3 mmol/L    Carbon Dioxide (CO2) 28 22 - 29 mmol/L    Anion Gap 8 7 - 15 mmol/L    Urea Nitrogen 8.4 8.0 - 23.0 mg/dL    Creatinine 0.90 0.51 - 0.95 mg/dL    GFR Estimate 72 >60 mL/min/1.73m2    Calcium 9.5 8.8 - 10.2 mg/dL    Chloride 105 98 - 107 mmol/L     Glucose 95 70 - 99 mg/dL    Alkaline Phosphatase 77 40 - 150 U/L    AST 21 0 - 45 U/L    ALT 16 0 - 50 U/L    Protein Total 6.7 6.4 - 8.3 g/dL    Albumin 4.3 3.5 - 5.2 g/dL    Bilirubin Total 0.5 <=1.2 mg/dL   CBC with platelets and differential    Collection Time: 06/05/24 10:56 AM   Result Value Ref Range    WBC Count 4.6 4.0 - 11.0 10e3/uL    RBC Count 4.57 3.80 - 5.20 10e6/uL    Hemoglobin 13.8 11.7 - 15.7 g/dL    Hematocrit 40.4 35.0 - 47.0 %    MCV 88 78 - 100 fL    MCH 30.2 26.5 - 33.0 pg    MCHC 34.2 31.5 - 36.5 g/dL    RDW 12.5 10.0 - 15.0 %    Platelet Count 142 (L) 150 - 450 10e3/uL    % Neutrophils 59 %    % Lymphocytes 31 %    % Monocytes 8 %    % Eosinophils 2 %    % Basophils 0 %    % Immature Granulocytes 0 %    Absolute Neutrophils 2.7 1.6 - 8.3 10e3/uL    Absolute Lymphocytes 1.4 0.8 - 5.3 10e3/uL    Absolute Monocytes 0.4 0.0 - 1.3 10e3/uL    Absolute Eosinophils 0.1 0.0 - 0.7 10e3/uL    Absolute Basophils 0.0 0.0 - 0.2 10e3/uL    Absolute Immature Granulocytes 0.0 <=0.4 10e3/uL   Reticulocyte count    Collection Time: 06/05/24 10:56 AM   Result Value Ref Range    % Reticulocyte 1.5 0.5 - 2.0 %    Absolute Reticulocyte 0.068 0.025 - 0.095 10e6/uL   Bld morphology pathology review    Collection Time: 06/10/24  1:28 PM   Result Value Ref Range    Final Diagnosis       Peripheral blood, morphology:  - Mild thrombocytopenia; no abnormal platelet clumping seen on the slide preparations.  - Hemoglobin quantitatively within normal limits and erythrocytes without diagnostic morphologic abnormality.  - WBC subsets quantitatively within normal limits and without diagnostic morphologic abnormality.  - Negative for schistocytes and definitive morphologic features of hemolysis.   - Negative for overt features of myelodysplasia and circulating blasts.    See comment.    COMMENT:  Thrombocytopenia is nonspecific and possible etiologies may include immune-associated platelet destruction (such as immune  thrombocytopenia [ITP], heparin-induced thrombocytopenia [HIT], post-transfusion purpura, or drug-induced antibodies), non-immune-associated platelet destruction (such as recent thrombosis or thrombotic microangiopathies), decreased platelet production (such as vitamin B12 or folate deficiency, underlying infection, myelodysplasia and other hematologic malignancies, aplastic anemia, or bone marrow suppression [by alcohol, medications, chemotherapy, radiation, or marrow infiltrative processes]), medications (including immunosuppressive agents), pregnancy (if applicable), splenic sequestration of platelets, or liver disease.  Clinical correlation is recommended.       Peripheral Smear       A microscopic examination is performed.       Peripheral Hematologic Data        Latest Reference Range & Units 06/05/24 10:56   WBC 4.0 - 11.0 10e3/uL 4.6   Hemoglobin 11.7 - 15.7 g/dL 13.8   Hematocrit 35.0 - 47.0 % 40.4   Platelet Count 150 - 450 10e3/uL 142 (L)   RBC Count 3.80 - 5.20 10e6/uL 4.57   MCV 78 - 100 fL 88   MCH 26.5 - 33.0 pg 30.2   MCHC 31.5 - 36.5 g/dL 34.2   RDW 10.0 - 15.0 % 12.5   % Neutrophils % 59   % Lymphocytes % 31   % Monocytes % 8   % Eosinophils % 2   % Basophils % 0   Absolute Basophils 0.0 - 0.2 10e3/uL 0.0   Absolute Eosinophils 0.0 - 0.7 10e3/uL 0.1   Absolute Immature Granulocytes <=0.4 10e3/uL 0.0   Absolute Lymphocytes 0.8 - 5.3 10e3/uL 1.4   Absolute Monocytes 0.0 - 1.3 10e3/uL 0.4   % Immature Granulocytes % 0   Absolute Neutrophils 1.6 - 8.3 10e3/uL 2.7   % Retic 0.5 - 2.0 % 1.5   Absolute Retic 0.025 - 0.095 10e6/uL 0.068   (L): Data is abnormally low    For patients over 18 years old, anemia and quantitative abnormalities of WBC and platelets (if present) may be further stratified as follows:    WBC (10^9/L):    Greater than 50.0: Marked leukocytosis    18.0 - 50.0: Moderate leukocytosis    11.1 - 17.9: Mild leukocytosis    3.0 - 3.9: Mild leukopenia    2.0 - 2.9: Moderate leukopenia     Less than 2.0: Marked leukopenia    Hemoglobin (g/dL) in females:    9.7 - 11.6: Mild anemia    7.7 - 9.6: Moderate anemia    Less than 7.7: Marked anemia    Platelets (10^9/L):    Greater than 900: Marked thrombocytosis    601 - 900: Moderate thrombocytosis    451 - 600: Mild thrombocytosis    100 - 149: Mild thrombocytopenia    50 - 99: Moderate thrombocytopenia    Less than 50: Marked thrombocytopenia       Performing Labs       The technical component of this testing was completed at [facility], [facility] and [facility]       No results found for this or any previous visit (from the past 744 hour(s)).      Again, thank you for allowing me to participate in the care of your patient.        Sincerely,        Danitza Jarvis MD

## 2024-07-01 NOTE — PROGRESS NOTES
05/02/24 0500   Appointment Info   Signing clinician's name / credentials Rosa Barbosa PT, DPT   Total/Authorized Visits Eval and Treat   Visits Used 4   Medical Diagnosis Left hip pain  Piriformis syndrome of left side  Lumbar degenerative disc disease  Primary osteoarthritis of left hip   PT Tx Diagnosis Left lower extremity pain, back pain with radiating pain   Progress Note/Certification   Onset of illness/injury or Date of Surgery 03/22/24   Therapy Frequency 1 x per week   Predicted Duration 8 weeks   Progress Note Due Date 05/22/24   Progress Note Completed Date 03/27/24   GOALS   PT Goals 2;3   PT Goal 1   Goal Identifier Goal 1   Goal Description Pt will be able to squat to reach floor w/o pain in order to be able to get on and off the ground without pain.   Rationale to maximize safety and independence with performance of ADLs and functional tasks   Target Date 05/22/24   PT Goal 2   Goal Identifier Goal 2   Goal Description The patient will be able to ascend and descend stairs w/o pain in order to maximize community mobility and facilitate a healthy lifestyle.   Rationale to maximize safety and independence within the community   Target Date 05/22/24   PT Goal 3   Goal Identifier Goal 3   Goal Description The pt will be able to walk for 30 minutes without pain in order to facilitate a healthy, active lifestyle.   Rationale to maximize safety and independence within the home;to maximize safety and independence within the community;to maximize safety and independence with transportation   Target Date 05/22/24   Subjective Report   Subjective Report Shereen is in pretty excruciating, consistent pain. She scheduled an injection for next Friday.   PT Modalities   PT Modalities Cryotherapy;Electrical Stimulation;Hot Packs   Treatment Interventions (PT)   Interventions Therapeutic Procedure/Exercise;Therapeutic Activity;Neuromuscular Re-education;Gait Training;Manual Therapy   Therapeutic Procedure/Exercise    Ther Proc 1 Full ROM clamshells   Ther Proc 1 - Details 20 per side   PTRx Ther Proc 1 Seated MET For Anterior Posterior Innominate Rotation   PTRx Ther Proc 1 - Details No Notes   PTRx Ther Proc 2 Bird-dog w/ contralateral pilates ball push into knee   PTRx Ther Proc 2 - Details 1 x 15 per side   Skilled Intervention Verbal and tactile cueing for muscle engagement   Therapeutic Activity   Therapeutic Activities: dynamic activities to improve functional performance minutes (97356) 26   Therapeutic Activities Ther Act 2;Ther Act 3   Ther Act 1 Education on pelvic wand   Ther Act 1 - Details potential PFM involvement in her pain, demonstration on  in clinic   Ther Act 2 Education about bike seats   Ther Act 2 - Details stick to one that's cushioned   Ther Act 3 Interdisciplinary care education   Ther Act 3 - Details different options, surgery v conservative management   Skilled Intervention Patient education to reduce sx and improve activity tolerance   Manual Therapy   Manual Therapy: Mobilization, MFR, MLD, friction massage minutes (80521) 10   Manual Therapy Manual Therapy 2;Manual Therapy 3;Manual Therapy 4;Manual Therapy 5   Manual Therapy 1 Iliopsoas release   Manual Therapy 1 - Details 60 seconds B   Manual Therapy 2 Piriformis release   Manual Therapy 2 - Details 60 seconds B   Manual Therapy 3 Supine pelvic MET   Manual Therapy 3 - Details 60 seconds L   Manual Therapy 4 Pubic shotgun   Manual Therapy 4 - Details 3 rounds, 5 seconds each   Skilled Intervention Manual therapy to reduce pain and improve activity tolerance   Education   Learner/Method Patient;Pictures/Video;No Barriers to Learning;Demonstration;Listening;Reading   Plan   Home program See PTRX   Plan for next session Reassess and progress as tolerated   Total Session Time   Timed Code Treatment Minutes 36   Total Treatment Time (sum of timed and untimed services) 36         DISCHARGE  Reason for Discharge: Patient has failed to  schedule further appointments.    Equipment Issued: n/a    Discharge Plan: Patient to continue home program.    Referring Provider:  Albert Yeo

## 2024-11-19 ENCOUNTER — TELEPHONE (OUTPATIENT)
Dept: INTERNAL MEDICINE | Facility: CLINIC | Age: 59
End: 2024-11-19

## 2024-11-21 ENCOUNTER — VIRTUAL VISIT (OUTPATIENT)
Dept: INTERNAL MEDICINE | Facility: CLINIC | Age: 59
End: 2024-11-21

## 2024-11-21 DIAGNOSIS — J06.9 UPPER RESPIRATORY TRACT INFECTION, UNSPECIFIED TYPE: Primary | ICD-10-CM

## 2024-11-21 DIAGNOSIS — G62.9 NEUROPATHY: ICD-10-CM

## 2024-11-21 RX ORDER — PREDNISONE 10 MG/1
TABLET ORAL
Qty: 20 TABLET | Refills: 0 | Status: SHIPPED | OUTPATIENT
Start: 2024-11-21

## 2024-11-21 RX ORDER — GABAPENTIN 100 MG/1
100 CAPSULE ORAL 3 TIMES DAILY
Qty: 270 CAPSULE | Refills: 3 | Status: SHIPPED | OUTPATIENT
Start: 2024-11-21

## 2024-11-21 RX ORDER — GUAIFENESIN 600 MG/1
1200 TABLET, EXTENDED RELEASE ORAL 2 TIMES DAILY
Qty: 20 TABLET | Refills: 0 | Status: SHIPPED | OUTPATIENT
Start: 2024-11-21 | End: 2024-12-01

## 2024-11-21 ASSESSMENT — ASTHMA QUESTIONNAIRES
QUESTION_5 LAST FOUR WEEKS HOW WOULD YOU RATE YOUR ASTHMA CONTROL: COMPLETELY CONTROLLED
QUESTION_4 LAST FOUR WEEKS HOW OFTEN HAVE YOU USED YOUR RESCUE INHALER OR NEBULIZER MEDICATION (SUCH AS ALBUTEROL): TWO OR THREE TIMES PER WEEK
ACT_TOTALSCORE: 23
QUESTION_3 LAST FOUR WEEKS HOW OFTEN DID YOUR ASTHMA SYMPTOMS (WHEEZING, COUGHING, SHORTNESS OF BREATH, CHEST TIGHTNESS OR PAIN) WAKE YOU UP AT NIGHT OR EARLIER THAN USUAL IN THE MORNING: NOT AT ALL
QUESTION_1 LAST FOUR WEEKS HOW MUCH OF THE TIME DID YOUR ASTHMA KEEP YOU FROM GETTING AS MUCH DONE AT WORK, SCHOOL OR AT HOME: NONE OF THE TIME
QUESTION_2 LAST FOUR WEEKS HOW OFTEN HAVE YOU HAD SHORTNESS OF BREATH: NOT AT ALL
ACT_TOTALSCORE: 23

## 2024-11-21 ASSESSMENT — ANXIETY QUESTIONNAIRES
1. FEELING NERVOUS, ANXIOUS, OR ON EDGE: NOT AT ALL
GAD7 TOTAL SCORE: 1
7. FEELING AFRAID AS IF SOMETHING AWFUL MIGHT HAPPEN: NOT AT ALL
6. BECOMING EASILY ANNOYED OR IRRITABLE: NOT AT ALL
4. TROUBLE RELAXING: SEVERAL DAYS
7. FEELING AFRAID AS IF SOMETHING AWFUL MIGHT HAPPEN: NOT AT ALL
2. NOT BEING ABLE TO STOP OR CONTROL WORRYING: NOT AT ALL
IF YOU CHECKED OFF ANY PROBLEMS ON THIS QUESTIONNAIRE, HOW DIFFICULT HAVE THESE PROBLEMS MADE IT FOR YOU TO DO YOUR WORK, TAKE CARE OF THINGS AT HOME, OR GET ALONG WITH OTHER PEOPLE: NOT DIFFICULT AT ALL
8. IF YOU CHECKED OFF ANY PROBLEMS, HOW DIFFICULT HAVE THESE MADE IT FOR YOU TO DO YOUR WORK, TAKE CARE OF THINGS AT HOME, OR GET ALONG WITH OTHER PEOPLE?: NOT DIFFICULT AT ALL
3. WORRYING TOO MUCH ABOUT DIFFERENT THINGS: NOT AT ALL
5. BEING SO RESTLESS THAT IT IS HARD TO SIT STILL: NOT AT ALL

## 2024-11-21 NOTE — PROGRESS NOTES
"Shereen is a 59 year old who is being evaluated via a billable telephone visit.    What phone number would you like to be contacted at? [phone]  How would you like to obtain your AVS? Jarrod  Originating Location (pt. Location): Other work    Distant Location (provider location):  On-site  work  Assessment & Plan   Problem List Items Addressed This Visit          Nervous and Auditory    Neuropathy    Relevant Medications    gabapentin (NEURONTIN) 100 MG capsule     Other Visit Diagnoses       Upper respiratory tract infection, unspecified type    -  Primary    Relevant Medications    guaiFENesin (MUCINEX) 600 MG 12 hr tablet    predniSONE (DELTASONE) 10 MG tablet                  BMI  Estimated body mass index is 28.35 kg/m  as calculated from the following:    Height as of 6/24/24: 1.702 m (5' 7\").    Weight as of 6/24/24: 82.1 kg (181 lb).             Subjective   Shereen is a 59 year old, presenting for the following health issues: Patient reports that she was prescribed amoxicillin for 10 days for sinusitis.  Patient completed antibiotic regimen 2 days ago and is still struggling with coughing and congestion in her chest.  Pt reports that deep breathing and talking stimulates her to cough. Pt reports post nasal drip. Pt denies any nasal congestion. Pt takes cough syrup. Pt reports that her chest feels tight. Pt denies any ear pain. Pt is coughing nonstop during visit.  Patient has a history of chronic bronchitis.  Patient reports that her albuterol inhaler is not effective.  Prescribed Mucinex to help liquefy her secretions and prescribed her prednisone to help with the chest tightness  and opening of her bronchioles to enable her to cough things up since the inhaler is not working.    Patient was requesting a refill of gabapentin and wants her dose reduced to 100 mg 3 times a day instead of 200 mg 3 times a day.   Recheck Medication      11/21/2024     2:11 PM   Additional Questions   Roomed by corin gregory "   Accompanied by self         11/21/2024     2:11 PM   Patient Reported Additional Medications   Patient reports taking the following new medications no     History of Present Illness       Reason for visit:  Neurorhapy    She eats 2-3 servings of fruits and vegetables daily.She consumes 1 sweetened beverage(s) daily.She exercises with enough effort to increase her heart rate 30 to 60 minutes per day.  She exercises with enough effort to increase her heart rate 7 days per week.   She is taking medications regularly.               Review of Systems  Constitutional, HEENT, cardiovascular, pulmonary, gi and gu systems are negative, except as otherwise noted.      Objective           Vitals:  No vitals were obtained today due to virtual visit.    Physical Exam   General: Alert and no distress //Respiratory: No audible wheeze, cough, or shortness of breath // Psychiatric:  Appropriate affect, tone, and pace of words            Phone call duration: 28 minutes  Signed Electronically by: ABISAI Milan CNP

## 2025-04-15 ENCOUNTER — PATIENT OUTREACH (OUTPATIENT)
Dept: CARE COORDINATION | Facility: CLINIC | Age: 60
End: 2025-04-15
Payer: COMMERCIAL

## 2025-04-29 ENCOUNTER — PATIENT OUTREACH (OUTPATIENT)
Dept: CARE COORDINATION | Facility: CLINIC | Age: 60
End: 2025-04-29
Payer: COMMERCIAL

## 2025-06-21 ENCOUNTER — HEALTH MAINTENANCE LETTER (OUTPATIENT)
Age: 60
End: 2025-06-21

## 2025-07-20 DIAGNOSIS — R21 RASH: ICD-10-CM

## 2025-07-20 DIAGNOSIS — B00.9 HERPES SIMPLEX VIRUS INFECTION: ICD-10-CM

## 2025-07-21 RX ORDER — ACYCLOVIR 400 MG/1
400 TABLET ORAL 3 TIMES DAILY
Qty: 21 TABLET | Refills: 3 | Status: SHIPPED | OUTPATIENT
Start: 2025-07-21